# Patient Record
Sex: FEMALE | Race: WHITE | NOT HISPANIC OR LATINO | Employment: FULL TIME | URBAN - METROPOLITAN AREA
[De-identification: names, ages, dates, MRNs, and addresses within clinical notes are randomized per-mention and may not be internally consistent; named-entity substitution may affect disease eponyms.]

---

## 2017-01-03 ENCOUNTER — HOSPITAL ENCOUNTER (OUTPATIENT)
Dept: LAB | Facility: MEDICAL CENTER | Age: 57
End: 2017-01-03
Attending: FAMILY MEDICINE
Payer: COMMERCIAL

## 2017-01-03 ENCOUNTER — HOSPITAL ENCOUNTER (OUTPATIENT)
Facility: MEDICAL CENTER | Age: 57
End: 2017-01-03
Attending: FAMILY MEDICINE
Payer: COMMERCIAL

## 2017-01-03 ENCOUNTER — OFFICE VISIT (OUTPATIENT)
Dept: URGENT CARE | Facility: PHYSICIAN GROUP | Age: 57
End: 2017-01-03
Payer: COMMERCIAL

## 2017-01-03 VITALS
WEIGHT: 132 LBS | HEIGHT: 62 IN | DIASTOLIC BLOOD PRESSURE: 80 MMHG | OXYGEN SATURATION: 100 % | HEART RATE: 87 BPM | BODY MASS INDEX: 24.29 KG/M2 | TEMPERATURE: 98.3 F | SYSTOLIC BLOOD PRESSURE: 118 MMHG | RESPIRATION RATE: 14 BRPM

## 2017-01-03 DIAGNOSIS — N30.01 ACUTE CYSTITIS WITH HEMATURIA: ICD-10-CM

## 2017-01-03 LAB
25(OH)D3 SERPL-MCNC: 15 NG/ML (ref 30–100)
ALBUMIN SERPL BCP-MCNC: 4.3 G/DL (ref 3.2–4.9)
ALBUMIN/GLOB SERPL: 1.7 G/DL
ALP SERPL-CCNC: 60 U/L (ref 30–99)
ALT SERPL-CCNC: 24 U/L (ref 2–50)
ANION GAP SERPL CALC-SCNC: 6 MMOL/L (ref 0–11.9)
APPEARANCE UR: CLEAR
AST SERPL-CCNC: 20 U/L (ref 12–45)
BASOPHILS # BLD AUTO: 0.04 K/UL (ref 0–0.12)
BASOPHILS NFR BLD AUTO: 0.6 % (ref 0–1.8)
BILIRUB SERPL-MCNC: 0.6 MG/DL (ref 0.1–1.5)
BILIRUB UR STRIP-MCNC: NORMAL MG/DL
BUN SERPL-MCNC: 12 MG/DL (ref 8–22)
CALCIUM SERPL-MCNC: 9.5 MG/DL (ref 8.5–10.5)
CHLORIDE SERPL-SCNC: 107 MMOL/L (ref 96–112)
CHOLEST SERPL-MCNC: 166 MG/DL (ref 100–199)
CO2 SERPL-SCNC: 26 MMOL/L (ref 20–33)
COLOR UR AUTO: YELLOW
CREAT SERPL-MCNC: 0.68 MG/DL (ref 0.5–1.4)
EOSINOPHIL # BLD: 0.14 K/UL (ref 0–0.51)
EOSINOPHIL NFR BLD AUTO: 2.1 % (ref 0–6.9)
ERYTHROCYTE [DISTWIDTH] IN BLOOD BY AUTOMATED COUNT: 47.5 FL (ref 35.9–50)
GLOBULIN SER CALC-MCNC: 2.6 G/DL (ref 1.9–3.5)
GLUCOSE SERPL-MCNC: 109 MG/DL (ref 65–99)
GLUCOSE UR STRIP.AUTO-MCNC: NORMAL MG/DL
HCT VFR BLD AUTO: 40 % (ref 37–47)
HDLC SERPL-MCNC: 56 MG/DL
HGB BLD-MCNC: 12.7 G/DL (ref 12–16)
IMM GRANULOCYTES # BLD AUTO: 0.02 K/UL (ref 0–0.11)
IMM GRANULOCYTES NFR BLD AUTO: 0.3 % (ref 0–0.9)
KETONES UR STRIP.AUTO-MCNC: NORMAL MG/DL
LDLC SERPL CALC-MCNC: 96 MG/DL
LEUKOCYTE ESTERASE UR QL STRIP.AUTO: NORMAL
LYMPHOCYTES # BLD: 1.47 K/UL (ref 1–4.8)
LYMPHOCYTES NFR BLD AUTO: 22.5 % (ref 22–41)
MCH RBC QN AUTO: 31.4 PG (ref 27–33)
MCHC RBC AUTO-ENTMCNC: 31.8 G/DL (ref 33.6–35)
MCV RBC AUTO: 99 FL (ref 81.4–97.8)
MONOCYTES # BLD: 0.48 K/UL (ref 0–0.85)
MONOCYTES NFR BLD AUTO: 7.4 % (ref 0–13.4)
NEUTROPHILS # BLD: 4.37 K/UL (ref 2–7.15)
NEUTROPHILS NFR BLD AUTO: 67.1 % (ref 44–72)
NITRITE UR QL STRIP.AUTO: NORMAL
NRBC # BLD AUTO: 0 K/UL
NRBC BLD-RTO: 0 /100 WBC
PH UR STRIP.AUTO: 6 [PH] (ref 5–8)
PLATELET # BLD AUTO: 235 K/UL (ref 164–446)
PMV BLD AUTO: 11.4 FL (ref 9–12.9)
POTASSIUM SERPL-SCNC: 4.3 MMOL/L (ref 3.6–5.5)
PROT SERPL-MCNC: 6.9 G/DL (ref 6–8.2)
PROT UR QL STRIP: NORMAL MG/DL
RBC # BLD AUTO: 4.04 M/UL (ref 4.2–5.4)
RBC UR QL AUTO: NORMAL
SODIUM SERPL-SCNC: 139 MMOL/L (ref 135–145)
SP GR UR STRIP.AUTO: 1.02
TRIGL SERPL-MCNC: 72 MG/DL (ref 0–149)
TSH SERPL DL<=0.005 MIU/L-ACNC: 2.96 UIU/ML (ref 0.3–3.7)
UROBILINOGEN UR STRIP-MCNC: NORMAL MG/DL
WBC # BLD AUTO: 6.5 K/UL (ref 4.8–10.8)

## 2017-01-03 PROCEDURE — 84443 ASSAY THYROID STIM HORMONE: CPT

## 2017-01-03 PROCEDURE — 80061 LIPID PANEL: CPT

## 2017-01-03 PROCEDURE — 81002 URINALYSIS NONAUTO W/O SCOPE: CPT | Performed by: FAMILY MEDICINE

## 2017-01-03 PROCEDURE — 80053 COMPREHEN METABOLIC PANEL: CPT

## 2017-01-03 PROCEDURE — 87086 URINE CULTURE/COLONY COUNT: CPT

## 2017-01-03 PROCEDURE — 85025 COMPLETE CBC W/AUTO DIFF WBC: CPT

## 2017-01-03 PROCEDURE — 99214 OFFICE O/P EST MOD 30 MIN: CPT | Performed by: FAMILY MEDICINE

## 2017-01-03 PROCEDURE — 82306 VITAMIN D 25 HYDROXY: CPT

## 2017-01-03 PROCEDURE — 36415 COLL VENOUS BLD VENIPUNCTURE: CPT

## 2017-01-03 RX ORDER — NITROFURANTOIN 25; 75 MG/1; MG/1
100 CAPSULE ORAL EVERY 12 HOURS
Qty: 10 CAP | Refills: 0 | Status: SHIPPED | OUTPATIENT
Start: 2017-01-03 | End: 2017-01-03 | Stop reason: SDUPTHER

## 2017-01-03 RX ORDER — NITROFURANTOIN 25; 75 MG/1; MG/1
100 CAPSULE ORAL EVERY 12 HOURS
Qty: 10 CAP | Refills: 0 | Status: SHIPPED | OUTPATIENT
Start: 2017-01-03 | End: 2017-01-08

## 2017-01-03 ASSESSMENT — ENCOUNTER SYMPTOMS
VOMITING: 0
NAUSEA: 0
FATIGUE: 0
ARTHRALGIAS: 0
ABDOMINAL PAIN: 1
FEVER: 0
MYALGIAS: 0
CHILLS: 0

## 2017-01-03 NOTE — PROGRESS NOTES
"Subjective:      Clau Lerner is a 56 y.o. female who presents with Urinary Frequency            Urinary Frequency  This is a new problem. The current episode started in the past 7 days. The problem occurs intermittently. The problem has been gradually worsening. Associated symptoms include abdominal pain and urinary symptoms. Pertinent negatives include no arthralgias, chest pain, chills, fatigue, fever, myalgias, nausea, rash or vomiting. Exacerbated by: the urge to go to bathroom. She has tried nothing for the symptoms. The treatment provided no relief.   Prior uti/s \"long time ago\".      Review of Systems   Constitutional: Negative for fever, chills and fatigue.   Cardiovascular: Negative for chest pain.   Gastrointestinal: Positive for abdominal pain. Negative for nausea and vomiting.   Musculoskeletal: Negative for myalgias and arthralgias.   Skin: Negative for rash.     PMH:  has a past medical history of Colon cancer (HCC).  MEDS: No current outpatient prescriptions on file.  ALLERGIES:   Allergies   Allergen Reactions   • Sulfa Drugs Rash     Trunk and chest     SURGHX:   Past Surgical History   Procedure Laterality Date   • Bowel resection       SOCHX:  reports that she quit smoking about 15 years ago. She does not have any smokeless tobacco history on file. She reports that she drinks alcohol. She reports that she does not use illicit drugs.  FH: Family history was reviewed, no pertinent findings to report       Objective:     /80 mmHg  Pulse 87  Temp(Src) 36.8 °C (98.3 °F)  Resp 14  Ht 1.575 m (5' 2\")  Wt 59.875 kg (132 lb)  BMI 24.14 kg/m2  SpO2 100%     Physical Exam   Constitutional: She appears well-developed. No distress.   HENT:   Head: Normocephalic.   Cardiovascular: Normal rate, regular rhythm and normal heart sounds.  Exam reveals no friction rub.    No murmur heard.  Pulmonary/Chest: Effort normal. No respiratory distress. She has no wheezes.   Abdominal: Soft. She exhibits " no distension. There is tenderness. There is no rebound and no guarding.   POSITIVE suprapubic tenderness without any CVAT. No organomegaly   Neurological: She is alert.   Skin: Skin is dry. No rash noted. She is not diaphoretic.   Psychiatric: She has a normal mood and affect. Her behavior is normal.               Assessment/Plan:     1. Acute cystitis with hematuria [N30.01]  POCT Urinalysis    nitrofurantoin monohydr macro (MACROBID) 100 MG Cap    Urine Culture     Supportive care  Push fluids  Monitor temperature  Follow-up if symptoms worsen or fail to improve

## 2017-01-05 LAB
BACTERIA UR CULT: NORMAL
SIGNIFICANT IND 70042: NORMAL
SOURCE SOURCE: NORMAL

## 2017-01-11 ENCOUNTER — HOSPITAL ENCOUNTER (OUTPATIENT)
Dept: RADIOLOGY | Facility: MEDICAL CENTER | Age: 57
End: 2017-01-11
Attending: PHYSICIAN ASSISTANT
Payer: COMMERCIAL

## 2017-01-11 DIAGNOSIS — R19.00 PELVIC MASS: ICD-10-CM

## 2017-01-11 PROCEDURE — 700117 HCHG RX CONTRAST REV CODE 255: Performed by: PHYSICIAN ASSISTANT

## 2017-01-11 PROCEDURE — 74177 CT ABD & PELVIS W/CONTRAST: CPT

## 2017-01-11 RX ADMIN — IOHEXOL 150 ML: 350 INJECTION, SOLUTION INTRAVENOUS at 13:16

## 2017-01-13 ENCOUNTER — HOSPITAL ENCOUNTER (OUTPATIENT)
Dept: LAB | Facility: MEDICAL CENTER | Age: 57
End: 2017-01-13
Attending: INTERNAL MEDICINE
Payer: COMMERCIAL

## 2017-01-13 LAB
ALBUMIN SERPL BCP-MCNC: 4.2 G/DL (ref 3.2–4.9)
ALBUMIN/GLOB SERPL: 1.4 G/DL
ALP SERPL-CCNC: 56 U/L (ref 30–99)
ALT SERPL-CCNC: 21 U/L (ref 2–50)
ANION GAP SERPL CALC-SCNC: 7 MMOL/L (ref 0–11.9)
AST SERPL-CCNC: 22 U/L (ref 12–45)
BASOPHILS # BLD AUTO: 0.02 K/UL (ref 0–0.12)
BASOPHILS NFR BLD AUTO: 0.4 % (ref 0–1.8)
BILIRUB SERPL-MCNC: 0.8 MG/DL (ref 0.1–1.5)
BUN SERPL-MCNC: 11 MG/DL (ref 8–22)
CALCIUM SERPL-MCNC: 9.5 MG/DL (ref 8.5–10.5)
CEA SERPL-MCNC: 25 NG/ML (ref 0–3)
CHLORIDE SERPL-SCNC: 107 MMOL/L (ref 96–112)
CO2 SERPL-SCNC: 24 MMOL/L (ref 20–33)
CREAT SERPL-MCNC: 0.7 MG/DL (ref 0.5–1.4)
EOSINOPHIL # BLD: 0.05 K/UL (ref 0–0.51)
EOSINOPHIL NFR BLD AUTO: 0.9 % (ref 0–6.9)
ERYTHROCYTE [DISTWIDTH] IN BLOOD BY AUTOMATED COUNT: 48.1 FL (ref 35.9–50)
GLOBULIN SER CALC-MCNC: 2.9 G/DL (ref 1.9–3.5)
GLUCOSE SERPL-MCNC: 104 MG/DL (ref 65–99)
HCT VFR BLD AUTO: 39.9 % (ref 37–47)
HGB BLD-MCNC: 13.1 G/DL (ref 12–16)
IMM GRANULOCYTES # BLD AUTO: 0.01 K/UL (ref 0–0.11)
IMM GRANULOCYTES NFR BLD AUTO: 0.2 % (ref 0–0.9)
LYMPHOCYTES # BLD: 1.17 K/UL (ref 1–4.8)
LYMPHOCYTES NFR BLD AUTO: 22 % (ref 22–41)
MCH RBC QN AUTO: 32.3 PG (ref 27–33)
MCHC RBC AUTO-ENTMCNC: 32.8 G/DL (ref 33.6–35)
MCV RBC AUTO: 98.5 FL (ref 81.4–97.8)
MONOCYTES # BLD: 0.52 K/UL (ref 0–0.85)
MONOCYTES NFR BLD AUTO: 9.8 % (ref 0–13.4)
NEUTROPHILS # BLD: 3.56 K/UL (ref 2–7.15)
NEUTROPHILS NFR BLD AUTO: 66.7 % (ref 44–72)
NRBC # BLD AUTO: 0 K/UL
NRBC BLD-RTO: 0 /100 WBC
PLATELET # BLD AUTO: 261 K/UL (ref 164–446)
PMV BLD AUTO: 11.3 FL (ref 9–12.9)
POTASSIUM SERPL-SCNC: 3.9 MMOL/L (ref 3.6–5.5)
PROT SERPL-MCNC: 7.1 G/DL (ref 6–8.2)
RBC # BLD AUTO: 4.05 M/UL (ref 4.2–5.4)
SODIUM SERPL-SCNC: 138 MMOL/L (ref 135–145)
WBC # BLD AUTO: 5.3 K/UL (ref 4.8–10.8)

## 2017-01-13 PROCEDURE — 80053 COMPREHEN METABOLIC PANEL: CPT

## 2017-01-13 PROCEDURE — 36415 COLL VENOUS BLD VENIPUNCTURE: CPT

## 2017-01-13 PROCEDURE — 85025 COMPLETE CBC W/AUTO DIFF WBC: CPT

## 2017-01-13 PROCEDURE — 82378 CARCINOEMBRYONIC ANTIGEN: CPT

## 2017-01-18 ENCOUNTER — HOSPITAL ENCOUNTER (OUTPATIENT)
Dept: RADIOLOGY | Facility: MEDICAL CENTER | Age: 57
End: 2017-01-18
Attending: INTERNAL MEDICINE
Payer: COMMERCIAL

## 2017-01-18 DIAGNOSIS — C18.0 MALIGNANT NEOPLASM OF CECUM (HCC): ICD-10-CM

## 2017-01-18 PROCEDURE — A9552 F18 FDG: HCPCS

## 2017-01-19 ENCOUNTER — APPOINTMENT (OUTPATIENT)
Dept: ADMISSIONS | Facility: MEDICAL CENTER | Age: 57
End: 2017-01-19
Attending: INTERNAL MEDICINE
Payer: COMMERCIAL

## 2017-01-19 RX ORDER — IBUPROFEN 200 MG
200 TABLET ORAL PRN
COMMUNITY
End: 2017-03-09

## 2017-01-20 ENCOUNTER — APPOINTMENT (OUTPATIENT)
Dept: RADIOLOGY | Facility: MEDICAL CENTER | Age: 57
End: 2017-01-20
Attending: INTERNAL MEDICINE
Payer: COMMERCIAL

## 2017-01-20 ENCOUNTER — HOSPITAL ENCOUNTER (OUTPATIENT)
Facility: MEDICAL CENTER | Age: 57
End: 2017-01-20
Attending: INTERNAL MEDICINE | Admitting: INTERNAL MEDICINE
Payer: COMMERCIAL

## 2017-01-20 VITALS
SYSTOLIC BLOOD PRESSURE: 94 MMHG | OXYGEN SATURATION: 95 % | HEART RATE: 86 BPM | HEIGHT: 62 IN | TEMPERATURE: 98 F | DIASTOLIC BLOOD PRESSURE: 58 MMHG | WEIGHT: 127.65 LBS | RESPIRATION RATE: 12 BRPM | BODY MASS INDEX: 23.49 KG/M2

## 2017-01-20 DIAGNOSIS — C18.0 MALIGNANT NEOPLASM OF CECUM (HCC): ICD-10-CM

## 2017-01-20 LAB
INR PPP: 1.1 (ref 0.87–1.13)
PROTHROMBIN TIME: 14.6 SEC (ref 12–14.6)

## 2017-01-20 PROCEDURE — 85610 PROTHROMBIN TIME: CPT

## 2017-01-20 PROCEDURE — 88305 TISSUE EXAM BY PATHOLOGIST: CPT

## 2017-01-20 PROCEDURE — 88342 IMHCHEM/IMCYTCHM 1ST ANTB: CPT

## 2017-01-20 PROCEDURE — 700111 HCHG RX REV CODE 636 W/ 250 OVERRIDE (IP)

## 2017-01-20 PROCEDURE — 88304 TISSUE EXAM BY PATHOLOGIST: CPT

## 2017-01-20 PROCEDURE — 88341 IMHCHEM/IMCYTCHM EA ADD ANTB: CPT | Mod: 91

## 2017-01-20 PROCEDURE — 4410213 CT-NEEDLE BX-ABD-RETROPERITONL

## 2017-01-20 PROCEDURE — 700111 HCHG RX REV CODE 636 W/ 250 OVERRIDE (IP): Performed by: RADIOLOGY

## 2017-01-20 RX ORDER — ONDANSETRON 2 MG/ML
4 INJECTION INTRAMUSCULAR; INTRAVENOUS PRN
Status: DISCONTINUED | OUTPATIENT
Start: 2017-01-20 | End: 2017-01-20 | Stop reason: HOSPADM

## 2017-01-20 RX ORDER — MIDAZOLAM HYDROCHLORIDE 1 MG/ML
INJECTION INTRAMUSCULAR; INTRAVENOUS
Status: COMPLETED
Start: 2017-01-20 | End: 2017-01-20

## 2017-01-20 RX ORDER — SODIUM CHLORIDE 9 MG/ML
500 INJECTION, SOLUTION INTRAVENOUS PRN
Status: DISCONTINUED | OUTPATIENT
Start: 2017-01-20 | End: 2017-01-20 | Stop reason: HOSPADM

## 2017-01-20 RX ORDER — OXYCODONE HYDROCHLORIDE 5 MG/1
10 TABLET ORAL
Status: DISCONTINUED | OUTPATIENT
Start: 2017-01-20 | End: 2017-01-20 | Stop reason: HOSPADM

## 2017-01-20 RX ORDER — SODIUM CHLORIDE 9 MG/ML
INJECTION, SOLUTION INTRAVENOUS
Status: DISCONTINUED | OUTPATIENT
Start: 2017-01-20 | End: 2017-01-20 | Stop reason: HOSPADM

## 2017-01-20 RX ORDER — OXYCODONE HYDROCHLORIDE 5 MG/1
5 TABLET ORAL
Status: DISCONTINUED | OUTPATIENT
Start: 2017-01-20 | End: 2017-01-20 | Stop reason: HOSPADM

## 2017-01-20 RX ORDER — MIDAZOLAM HYDROCHLORIDE 1 MG/ML
.5-2 INJECTION INTRAMUSCULAR; INTRAVENOUS PRN
Status: DISCONTINUED | OUTPATIENT
Start: 2017-01-20 | End: 2017-01-20 | Stop reason: HOSPADM

## 2017-01-20 RX ADMIN — FENTANYL CITRATE 50 MCG: 50 INJECTION, SOLUTION INTRAMUSCULAR; INTRAVENOUS at 08:35

## 2017-01-20 RX ADMIN — MIDAZOLAM HYDROCHLORIDE 2 MG: 1 INJECTION, SOLUTION INTRAMUSCULAR; INTRAVENOUS at 08:40

## 2017-01-20 RX ADMIN — FENTANYL CITRATE 50 MCG: 50 INJECTION, SOLUTION INTRAMUSCULAR; INTRAVENOUS at 08:40

## 2017-01-20 RX ADMIN — SODIUM CHLORIDE: 9 INJECTION, SOLUTION INTRAVENOUS at 06:49

## 2017-01-20 RX ADMIN — MIDAZOLAM 2 MG: 1 INJECTION INTRAMUSCULAR; INTRAVENOUS at 08:35

## 2017-01-20 RX ADMIN — MIDAZOLAM HYDROCHLORIDE 2 MG: 1 INJECTION, SOLUTION INTRAMUSCULAR; INTRAVENOUS at 08:35

## 2017-01-20 ASSESSMENT — PAIN SCALES - GENERAL
PAINLEVEL_OUTOF10: 0

## 2017-01-20 NOTE — IP AVS SNAPSHOT
1/20/2017          Clau Lerner  [address] [address]    Dear Clau:    [facility] wants to ensure your discharge home is safe and you or your loved ones have had all your questions answered regarding your care after you leave the hospital.    You may receive a telephone call within two days of your discharge.  This call is to make certain you understand your discharge instructions as well as ensure we provided you with the best care possible during your stay with us.     The call will only last approximately 3-5 minutes and will be done by a nurse.    Once again, we want to ensure your discharge home is safe and that you have a clear understanding of any next steps in your care.  If you have any questions or concerns, please do not hesitate to contact us, we are here for you.  Thank you for choosing [facility] for your healthcare needs.    Sincerely,    Tino Berrios    [facility]

## 2017-01-20 NOTE — IP AVS SNAPSHOT
" After Visit Summary                                                                                                                Name:Clau Lerner  Medical Record Number:[id number]  CSN: [id number]    YOB: 1960   Age: 56 y.o.  Sex: female  HT:1.575 m (5' 2\") WT: 57.9 kg (127 lb 10.3 oz)          Admit Date: 1/20/2017     Discharge Date:   Today's Date: 1/20/2017  Attending Doctor:  Jamia Mays M.D.                  Allergies:  Sulfa drugs                Discharge Instructions         ACTIVITY: Rest and take it easy for the first 24 hours.  A responsible adult is recommended to remain with you during that time.  It is normal to feel sleepy.  We encourage you to not do anything that requires balance, judgment or coordination.    MILD FLU-LIKE SYMPTOMS ARE NORMAL. YOU MAY EXPERIENCE GENERALIZED MUSCLE ACHES, THROAT IRRITATION, HEADACHE AND/OR SOME NAUSEA.    FOR 24 HOURS DO NOT:  Drive, operate machinery or run household appliances.  Drink beer or alcoholic beverages.   Make important decisions or sign legal documents.      DIET: To avoid nausea, slowly advance diet as tolerated, avoiding spicy or greasy foods for the first day.  Add more substantial food to your diet according to your physician's instructions.  Babies can be fed formula or breast milk as soon as they are hungry.  INCREASE FLUIDS AND FIBER TO AVOID CONSTIPATION.    SURGICAL DRESSING/BATHING:     Keep the dressing clean and dry for 24 hours.  May remove dressing tomorrow at 10 am and shower.  Do not submerge for 1 week.  No heavy lifting.      FOLLOW-UP APPOINTMENT:  A follow-up appointment should be arranged with your doctor ; call to schedule.    You should CALL YOUR PHYSICIAN if you develop:  Fever greater than 101 degrees F.  Pain not relieved by medication, or persistent nausea or vomiting.  Excessive bleeding (blood soaking through dressing) or unexpected drainage from the wound.  Extreme redness or swelling around the " incision site, drainage of pus or foul smelling drainage.  Inability to urinate or empty your bladder within 8 hours.  Problems with breathing or chest pain.    You should call 911 if you develop problems with breathing or chest pain.  If you are unable to contact your doctor or surgical center, you should go to the nearest emergency room or urgent care center.      Physician's telephone #: [phone]    If any questions arise, call your doctor.  If your doctor is not available, please feel free to call the Surgical Center at [phone].  The Center is open Monday through Friday from 7AM to 7PM.  You can also call the HEALTH HOTLINE open 24 hours/day, 7 days/week and speak to a nurse at [phone], or toll free at [phone].    A registered nurse may call you a few days after your surgery to see how you are doing after your procedure.    MEDICATIONS: Resume taking daily medication.  Take prescribed pain medication with food.  If no medication is prescribed, you may take non-aspirin pain medication if needed.  PAIN MEDICATION CAN BE VERY CONSTIPATING.  Take a stool softener or laxative such as senokot, pericolace, or milk of magnesia if needed.    If your physician has prescribed pain medication that includes Acetaminophen (Tylenol), do not take additional Acetaminophen (Tylenol) while taking the prescribed medication.    Depression / Suicide Risk    As you are discharged from this [facility] facility, it is important to learn how to keep safe from harming yourself.    Recognize the warning signs:  · Abrupt changes in personality, positive or negative- including increase in energy   · Giving away possessions  · Change in eating patterns- significant weight changes-  positive or negative  · Change in sleeping patterns- unable to sleep or sleeping all the time   · Unwillingness or inability to communicate  · Depression  · Unusual sadness, discouragement and loneliness  · Talk of wanting to die  · Neglect  of personal appearance   · Rebelliousness- reckless behavior  · Withdrawal from people/activities they love  · Confusion- inability to concentrate     If you or a loved one observes any of these behaviors or has concerns about self-harm, here's what you can do:  · Talk about it- your feelings and reasons for harming yourself  · Remove any means that you might use to hurt yourself (examples: pills, rope, extension cords, firearm)  · Get professional help from the community (Mental Health, Substance Abuse, psychological counseling)  · Do not be alone:Call your Safe Contact- someone whom you trust who will be there for you.  · Call your local CRISIS HOTLINE 706-9107 or 794-247-3376  · Call your local Children's Mobile Crisis Response Team [place] (255) 627-2371 or www.Vascular Closure  · Call the toll free National Suicide Prevention Hotlines   · National Suicide Prevention Lifeline 357-194-KPXY (2793)  · [place] Line Network 800-SUICIDE (528-9211)    Needle Biopsy, Care After    Refer to this sheet in the next few weeks. These instructions provide you with information about caring for yourself after your procedure. Your health care provider may also give you more specific instructions. Your treatment has been planned according to current medical practices, but problems sometimes occur. Call your health care provider if you have any problems or questions after your procedure.  WHAT TO EXPECT AFTER THE PROCEDURE  After your procedure, it is common to have soreness, bruising, or mild pain at the biopsy site. This should go away in a few days.  HOME CARE INSTRUCTIONS  · Rest as directed by your health care provider.  · Take medicines only as directed by your health care provider.  · There are many different ways to close and cover the biopsy site, including stitches (sutures), skin glue, and adhesive strips. Follow your health care provider's instructions about:  ¨ Biopsy site care.  ¨ Bandage (dressing)  changes and removal.  ¨ Biopsy site closure removal.  · Check your biopsy site every day for signs of infection. Watch for:  ¨ Redness, swelling, or pain.  ¨ Fluid, blood, or pus.  SEEK MEDICAL CARE IF:  · You have a fever.  · You have redness, swelling, or pain at the biopsy site that lasts longer than a few days.  · You have fluid, blood, or pus coming from the biopsy site.  · You feel nauseous.  · You vomit.  SEEK IMMEDIATE MEDICAL CARE IF:  · You have shortness of breath.  · You have trouble breathing.  · You have chest pain.    · You feel dizzy or you faint.  · You have bleeding that does not stop with pressure or a bandage.  · You cough up blood.  · You have pain in your abdomen.     This information is not intended to replace advice given to you by your health care provider. Make sure you discuss any questions you have with your health care provider.     Document Released: 05/03/2016 Document Reviewed: 12/14/2015  [employer] Interactive Patient Education ©2016 [employer] Inc.         Medication List      ASK your doctor about these medications        Instructions    ibuprofen 200 MG Tabs   Commonly known as:  MOTRIN    Take 200 mg by mouth as needed.   Dose:  200 mg               Medication Information     Above and/or attached are the medications your physician expects you to take upon discharge. Review all of your home medications and newly ordered medications with your doctor and/or pharmacist. Follow medication instructions as directed by your doctor and/or pharmacist. Please keep your medication list with you and share with your physician. Update the information when medications are discontinued, doses are changed, or new medications (including over-the-counter products) are added; and carry medication information at all times in the event of emergency situations.        Resources     Quit Smoking / Tobacco Use:    I understand the use of any tobacco products increases my chance of suffering from future  heart disease or stroke and could cause other illnesses which may shorten my life. Quitting the use of tobacco products is the single most important thing I can do to improve my health. For further information on smoking / tobacco cessation call a Toll Free Quit Line at 1-236.624.5630 (*[employer]) or 1-665.978.7813 (American Lung Association) or you can access the web based program at www.lungusa.org.    [place] Tobacco Users Help Line:  (416) 903-6800       Toll Free: 1-276.808.9032  Quit Tobacco Program [facility] (455)721-0743    Crisis Hotline:    [place] Crisis Hotline:  7-704-REHVNYW or 1-415.203.1643    [place] Crisis Hotline:    1-150.485.7139 or 855-144-8822    Discharge Survey:   Thank you for choosing [facility]. We hope we did everything we could to make your hospital stay a pleasant one. You may be receiving a survey and we would appreciate your time and participation in answering the questions. Your input is very valuable to us in our efforts to improve our service to our patients and their families.            Signatures     My signature on this form indicates that:    1. I acknowledge receipt and understanding of these Home Care Instruction.  2. My questions regarding this information have been answered to my satisfaction.  3. I have formulated a plan with my discharge nurse to obtain my prescribed medications for home.    __________________________________      __________________________________                   Patient Signature                                 Guardian/Responsible Adult Signature      __________________________________                 __________       ________                       Nurse Signature                                               Date                 Time

## 2017-01-20 NOTE — DISCHARGE INSTRUCTIONS
ACTIVITY: Rest and take it easy for the first 24 hours.  A responsible adult is recommended to remain with you during that time.  It is normal to feel sleepy.  We encourage you to not do anything that requires balance, judgment or coordination.    MILD FLU-LIKE SYMPTOMS ARE NORMAL. YOU MAY EXPERIENCE GENERALIZED MUSCLE ACHES, THROAT IRRITATION, HEADACHE AND/OR SOME NAUSEA.    FOR 24 HOURS DO NOT:  Drive, operate machinery or run household appliances.  Drink beer or alcoholic beverages.   Make important decisions or sign legal documents.      DIET: To avoid nausea, slowly advance diet as tolerated, avoiding spicy or greasy foods for the first day.  Add more substantial food to your diet according to your physician's instructions.  Babies can be fed formula or breast milk as soon as they are hungry.  INCREASE FLUIDS AND FIBER TO AVOID CONSTIPATION.    SURGICAL DRESSING/BATHING:     Keep the dressing clean and dry for 24 hours.  May remove dressing tomorrow at 10 am and shower.  Do not submerge for 1 week.  No heavy lifting.      FOLLOW-UP APPOINTMENT:  A follow-up appointment should be arranged with your doctor ; call to schedule.    You should CALL YOUR PHYSICIAN if you develop:  Fever greater than 101 degrees F.  Pain not relieved by medication, or persistent nausea or vomiting.  Excessive bleeding (blood soaking through dressing) or unexpected drainage from the wound.  Extreme redness or swelling around the incision site, drainage of pus or foul smelling drainage.  Inability to urinate or empty your bladder within 8 hours.  Problems with breathing or chest pain.    You should call 911 if you develop problems with breathing or chest pain.  If you are unable to contact your doctor or surgical center, you should go to the nearest emergency room or urgent care center.      Physician's telephone #: [phone]    If any questions arise, call your doctor.  If your doctor is not available, please feel free to call the  Surgical Center at [phone].  The Center is open Monday through Friday from 7AM to 7PM.  You can also call the HEALTH HOTLINE open 24 hours/day, 7 days/week and speak to a nurse at [phone], or toll free at [phone].    A registered nurse may call you a few days after your surgery to see how you are doing after your procedure.    MEDICATIONS: Resume taking daily medication.  Take prescribed pain medication with food.  If no medication is prescribed, you may take non-aspirin pain medication if needed.  PAIN MEDICATION CAN BE VERY CONSTIPATING.  Take a stool softener or laxative such as senokot, pericolace, or milk of magnesia if needed.    If your physician has prescribed pain medication that includes Acetaminophen (Tylenol), do not take additional Acetaminophen (Tylenol) while taking the prescribed medication.    Depression / Suicide Risk    As you are discharged from this [facility] facility, it is important to learn how to keep safe from harming yourself.    Recognize the warning signs:  · Abrupt changes in personality, positive or negative- including increase in energy   · Giving away possessions  · Change in eating patterns- significant weight changes-  positive or negative  · Change in sleeping patterns- unable to sleep or sleeping all the time   · Unwillingness or inability to communicate  · Depression  · Unusual sadness, discouragement and loneliness  · Talk of wanting to die  · Neglect of personal appearance   · Rebelliousness- reckless behavior  · Withdrawal from people/activities they love  · Confusion- inability to concentrate     If you or a loved one observes any of these behaviors or has concerns about self-harm, here's what you can do:  · Talk about it- your feelings and reasons for harming yourself  · Remove any means that you might use to hurt yourself (examples: pills, rope, extension cords, firearm)  · Get professional help from the community (Mental Health, Substance Abuse,  psychological counseling)  · Do not be alone:Call your Safe Contact- someone whom you trust who will be there for you.  · Call your local CRISIS HOTLINE 707-2458 or 864-929-2403  · Call your local Children's Mobile Crisis Response Team [place] (772) 646-4159 or www.qcue  · Call the toll free National Suicide Prevention Hotlines   · National Suicide Prevention Lifeline 196-995-JHAE (9062)  · National [employer] Line Network 800-SUICIDE (598-3874)    Needle Biopsy, Care After    Refer to this sheet in the next few weeks. These instructions provide you with information about caring for yourself after your procedure. Your health care provider may also give you more specific instructions. Your treatment has been planned according to current medical practices, but problems sometimes occur. Call your health care provider if you have any problems or questions after your procedure.  WHAT TO EXPECT AFTER THE PROCEDURE  After your procedure, it is common to have soreness, bruising, or mild pain at the biopsy site. This should go away in a few days.  HOME CARE INSTRUCTIONS  · Rest as directed by your health care provider.  · Take medicines only as directed by your health care provider.  · There are many different ways to close and cover the biopsy site, including stitches (sutures), skin glue, and adhesive strips. Follow your health care provider's instructions about:  ¨ Biopsy site care.  ¨ Bandage (dressing) changes and removal.  ¨ Biopsy site closure removal.  · Check your biopsy site every day for signs of infection. Watch for:  ¨ Redness, swelling, or pain.  ¨ Fluid, blood, or pus.  SEEK MEDICAL CARE IF:  · You have a fever.  · You have redness, swelling, or pain at the biopsy site that lasts longer than a few days.  · You have fluid, blood, or pus coming from the biopsy site.  · You feel nauseous.  · You vomit.  SEEK IMMEDIATE MEDICAL CARE IF:  · You have shortness of breath.  · You have trouble  breathing.  · You have chest pain.    · You feel dizzy or you faint.  · You have bleeding that does not stop with pressure or a bandage.  · You cough up blood.  · You have pain in your abdomen.     This information is not intended to replace advice given to you by your health care provider. Make sure you discuss any questions you have with your health care provider.     Document Released: 05/03/2016 Document Reviewed: 12/14/2015  [email or web address] Interactive Patient Education ©2016 [employer]vier Inc.

## 2017-01-20 NOTE — OR NURSING
0900: Patient from radio;logy to PPU via RECESS.. Patient is awake and on bedrest. VSS.  Lower abdominal site soft and dressing clean, dry and intact. Vascular access care management per MD order (see assessment). No c/o pain or nausea at this time.   0920: VSS. Patient tolerating PO well.   1000: VSS. Lower abdomen dressing CDI. No c/o pain or nausea at this time.  1030: DC instructions given. Questions answered. Family at bedside. Lower abdomen site soft,CDI. No c/o pain or nausea. Patient wide awake. VSS. Patient met criteria for discharge.

## 2017-01-20 NOTE — PROGRESS NOTES
CT Procedure RN's Note:    CT guided pelvic mass biopsy done by Dr. Naylor; anterior lower abdomen access site; x3 cores obtained and sent to pathology; pt tolerated the procedure well; pt currently hemodynamically stable; report given to Pierre; pt transported back to PPU for recovery; pt's family taken back to PPU with patient.

## 2017-01-20 NOTE — OR SURGEON
Immediate Post- Operative Note        PostOp Diagnosis: L4 Compression FX      Procedure(s): L4 kyphoplasty      Estimated Blood Loss: Less than 5 ml        Complications: None            1/20/2017     9:12 AM     Liborio Naylor

## 2017-02-22 ENCOUNTER — HOSPITAL ENCOUNTER (OUTPATIENT)
Dept: LAB | Facility: MEDICAL CENTER | Age: 57
End: 2017-02-22
Payer: COMMERCIAL

## 2017-02-22 LAB
ALBUMIN SERPL BCP-MCNC: 4.4 G/DL (ref 3.2–4.9)
ALP SERPL-CCNC: 66 U/L (ref 30–99)
AST SERPL-CCNC: 24 U/L (ref 12–45)
BASOPHILS # BLD AUTO: 0.02 K/UL (ref 0–0.12)
BASOPHILS NFR BLD AUTO: 0.3 % (ref 0–1.8)
BILIRUB SERPL-MCNC: 0.5 MG/DL (ref 0.1–1.5)
CREAT SERPL-MCNC: 0.86 MG/DL (ref 0.5–1.4)
EOSINOPHIL # BLD: 0.04 K/UL (ref 0–0.51)
EOSINOPHIL NFR BLD AUTO: 0.6 % (ref 0–6.9)
ERYTHROCYTE [DISTWIDTH] IN BLOOD BY AUTOMATED COUNT: 45.5 FL (ref 35.9–50)
HCT VFR BLD AUTO: 38.9 % (ref 37–47)
HGB BLD-MCNC: 12.1 G/DL (ref 12–16)
IMM GRANULOCYTES # BLD AUTO: 0.02 K/UL (ref 0–0.11)
IMM GRANULOCYTES NFR BLD AUTO: 0.3 % (ref 0–0.9)
LDH SERPL L TO P-CCNC: 266 U/L (ref 107–266)
LYMPHOCYTES # BLD: 0.96 K/UL (ref 1–4.8)
LYMPHOCYTES NFR BLD AUTO: 14.3 % (ref 22–41)
MCH RBC QN AUTO: 30.7 PG (ref 27–33)
MCHC RBC AUTO-ENTMCNC: 31.1 G/DL (ref 33.6–35)
MCV RBC AUTO: 98.7 FL (ref 81.4–97.8)
MONOCYTES # BLD: 0.5 K/UL (ref 0–0.85)
MONOCYTES NFR BLD AUTO: 7.4 % (ref 0–13.4)
NEUTROPHILS # BLD: 5.18 K/UL (ref 2–7.15)
NEUTROPHILS NFR BLD AUTO: 77.1 % (ref 44–72)
NRBC # BLD AUTO: 0 K/UL
NRBC BLD-RTO: 0 /100 WBC
PLATELET # BLD AUTO: 268 K/UL (ref 164–446)
PMV BLD AUTO: 11.4 FL (ref 9–12.9)
RBC # BLD AUTO: 3.94 M/UL (ref 4.2–5.4)
WBC # BLD AUTO: 6.7 K/UL (ref 4.8–10.8)

## 2017-02-22 PROCEDURE — 82040 ASSAY OF SERUM ALBUMIN: CPT

## 2017-02-22 PROCEDURE — 82565 ASSAY OF CREATININE: CPT

## 2017-02-22 PROCEDURE — 82378 CARCINOEMBRYONIC ANTIGEN: CPT

## 2017-02-22 PROCEDURE — 86316 IMMUNOASSAY TUMOR OTHER: CPT

## 2017-02-22 PROCEDURE — 82247 BILIRUBIN TOTAL: CPT

## 2017-02-22 PROCEDURE — 83615 LACTATE (LD) (LDH) ENZYME: CPT

## 2017-02-22 PROCEDURE — 36415 COLL VENOUS BLD VENIPUNCTURE: CPT

## 2017-02-22 PROCEDURE — 85025 COMPLETE CBC W/AUTO DIFF WBC: CPT

## 2017-02-22 PROCEDURE — 84450 TRANSFERASE (AST) (SGOT): CPT

## 2017-02-22 PROCEDURE — 84075 ASSAY ALKALINE PHOSPHATASE: CPT

## 2017-02-23 LAB — CEA SERPL-MCNC: 30.3 NG/ML (ref 0–3)

## 2017-02-24 ENCOUNTER — APPOINTMENT (OUTPATIENT)
Dept: RADIOLOGY | Facility: MEDICAL CENTER | Age: 57
End: 2017-02-24
Attending: INTERNAL MEDICINE
Payer: COMMERCIAL

## 2017-02-24 ENCOUNTER — HOSPITAL ENCOUNTER (OUTPATIENT)
Facility: MEDICAL CENTER | Age: 57
End: 2017-02-24
Attending: INTERNAL MEDICINE | Admitting: INTERNAL MEDICINE
Payer: COMMERCIAL

## 2017-02-24 VITALS
WEIGHT: 139.33 LBS | HEIGHT: 62 IN | HEART RATE: 89 BPM | TEMPERATURE: 97.5 F | OXYGEN SATURATION: 96 % | SYSTOLIC BLOOD PRESSURE: 106 MMHG | BODY MASS INDEX: 25.64 KG/M2 | RESPIRATION RATE: 16 BRPM | DIASTOLIC BLOOD PRESSURE: 57 MMHG

## 2017-02-24 DIAGNOSIS — C18.0 MALIGNANT NEOPLASM OF CECUM (HCC): ICD-10-CM

## 2017-02-24 LAB — NSE SERPL-MCNC: 3.4 UG/L (ref 3.7–8.9)

## 2017-02-24 PROCEDURE — 700111 HCHG RX REV CODE 636 W/ 250 OVERRIDE (IP)

## 2017-02-24 PROCEDURE — 99153 MOD SED SAME PHYS/QHP EA: CPT

## 2017-02-24 PROCEDURE — 160002 HCHG RECOVERY MINUTES (STAT)

## 2017-02-24 PROCEDURE — 77001 FLUOROGUIDE FOR VEIN DEVICE: CPT

## 2017-02-24 PROCEDURE — 36561 INSERT TUNNELED CV CATH: CPT

## 2017-02-24 PROCEDURE — 700111 HCHG RX REV CODE 636 W/ 250 OVERRIDE (IP): Mod: JW | Performed by: RADIOLOGY

## 2017-02-24 PROCEDURE — 700101 HCHG RX REV CODE 250

## 2017-02-24 PROCEDURE — 76937 US GUIDE VASCULAR ACCESS: CPT

## 2017-02-24 PROCEDURE — C1894 INTRO/SHEATH, NON-LASER: HCPCS

## 2017-02-24 RX ORDER — LIDOCAINE HYDROCHLORIDE AND EPINEPHRINE BITARTRATE 20; .01 MG/ML; MG/ML
INJECTION, SOLUTION SUBCUTANEOUS
Status: COMPLETED
Start: 2017-02-24 | End: 2017-02-24

## 2017-02-24 RX ORDER — MIDAZOLAM HYDROCHLORIDE 1 MG/ML
INJECTION INTRAMUSCULAR; INTRAVENOUS
Status: COMPLETED
Start: 2017-02-24 | End: 2017-02-24

## 2017-02-24 RX ORDER — SODIUM CHLORIDE 9 MG/ML
INJECTION, SOLUTION INTRAVENOUS
Status: DISCONTINUED | OUTPATIENT
Start: 2017-02-24 | End: 2017-02-24 | Stop reason: HOSPADM

## 2017-02-24 RX ORDER — OXYCODONE HYDROCHLORIDE 5 MG/1
5 TABLET ORAL
Status: DISCONTINUED | OUTPATIENT
Start: 2017-02-24 | End: 2017-02-24 | Stop reason: HOSPADM

## 2017-02-24 RX ORDER — ONDANSETRON 2 MG/ML
4 INJECTION INTRAMUSCULAR; INTRAVENOUS EVERY 8 HOURS PRN
Status: DISCONTINUED | OUTPATIENT
Start: 2017-02-24 | End: 2017-02-24 | Stop reason: HOSPADM

## 2017-02-24 RX ORDER — OXYCODONE HYDROCHLORIDE 5 MG/1
10 TABLET ORAL
Status: DISCONTINUED | OUTPATIENT
Start: 2017-02-24 | End: 2017-02-24 | Stop reason: HOSPADM

## 2017-02-24 RX ORDER — MIDAZOLAM HYDROCHLORIDE 1 MG/ML
.5-2 INJECTION INTRAMUSCULAR; INTRAVENOUS PRN
Status: DISCONTINUED | OUTPATIENT
Start: 2017-02-24 | End: 2017-02-24 | Stop reason: HOSPADM

## 2017-02-24 RX ORDER — SODIUM CHLORIDE 9 MG/ML
500 INJECTION, SOLUTION INTRAVENOUS PRN
Status: DISCONTINUED | OUTPATIENT
Start: 2017-02-24 | End: 2017-02-24 | Stop reason: HOSPADM

## 2017-02-24 RX ORDER — MORPHINE SULFATE 4 MG/ML
4 INJECTION, SOLUTION INTRAMUSCULAR; INTRAVENOUS
Status: DISCONTINUED | OUTPATIENT
Start: 2017-02-24 | End: 2017-02-24 | Stop reason: HOSPADM

## 2017-02-24 RX ORDER — ONDANSETRON 2 MG/ML
4 INJECTION INTRAMUSCULAR; INTRAVENOUS PRN
Status: DISCONTINUED | OUTPATIENT
Start: 2017-02-24 | End: 2017-02-24 | Stop reason: HOSPADM

## 2017-02-24 RX ADMIN — HEPARIN: 100 SYRINGE at 10:16

## 2017-02-24 RX ADMIN — MIDAZOLAM HYDROCHLORIDE 2 MG: 1 INJECTION, SOLUTION INTRAMUSCULAR; INTRAVENOUS at 10:12

## 2017-02-24 RX ADMIN — MIDAZOLAM HYDROCHLORIDE 1 MG: 1 INJECTION, SOLUTION INTRAMUSCULAR; INTRAVENOUS at 10:03

## 2017-02-24 RX ADMIN — FENTANYL CITRATE 25 MCG: 50 INJECTION, SOLUTION INTRAMUSCULAR; INTRAVENOUS at 10:20

## 2017-02-24 RX ADMIN — MIDAZOLAM HYDROCHLORIDE 2 MG: 1 INJECTION, SOLUTION INTRAMUSCULAR; INTRAVENOUS at 10:18

## 2017-02-24 RX ADMIN — MIDAZOLAM 1 MG: 1 INJECTION INTRAMUSCULAR; INTRAVENOUS at 10:03

## 2017-02-24 RX ADMIN — MIDAZOLAM HYDROCHLORIDE 1 MG: 1 INJECTION, SOLUTION INTRAMUSCULAR; INTRAVENOUS at 10:15

## 2017-02-24 RX ADMIN — FENTANYL CITRATE 25 MCG: 50 INJECTION, SOLUTION INTRAMUSCULAR; INTRAVENOUS at 10:03

## 2017-02-24 RX ADMIN — FENTANYL CITRATE 25 MCG: 50 INJECTION, SOLUTION INTRAMUSCULAR; INTRAVENOUS at 10:15

## 2017-02-24 RX ADMIN — SODIUM CHLORIDE: 9 INJECTION, SOLUTION INTRAVENOUS at 10:00

## 2017-02-24 RX ADMIN — LIDOCAINE HYDROCHLORIDE AND EPINEPHRINE: 20; 10 INJECTION, SOLUTION INFILTRATION; PERINEURAL at 10:15

## 2017-02-24 ASSESSMENT — PAIN SCALES - GENERAL
PAINLEVEL_OUTOF10: 0

## 2017-02-24 NOTE — PROGRESS NOTES
IR RN note:    Tunneled Port Placement by MD Danielle assisted by RT Oates,Right upper chest and IJ access site;  Port Placed 24 cm    BARD PowerPort ClearVUE Slim implantable port 8 Fr open-ended single lumen Venous  REF# [id number]   LOT# [id number]    Patient tolerated procedure, hemodynamically stable; report given to PPU ALICE Lopez;   patient transported back to PPU via RN

## 2017-02-24 NOTE — IP AVS SNAPSHOT
" Home Care Instructions                                                                                                                Name:Clau Lerner  Medical Record Number:[id number]  CSN: [id number]    YOB: 1960   Age: 56 y.o.  Sex: female  HT:1.575 m (5' 2\") WT: 63.2 kg (139 lb 5.3 oz)          Admit Date: 2/24/2017     Discharge Date:   Today's Date: 2/24/2017  Attending Doctor:  Jamia Mays M.D.                  Allergies:  Sulfa drugs                Discharge Instructions         ACTIVITY: Rest and take it easy for the first 24 hours.  A responsible adult is recommended to remain with you during that time.  It is normal to feel sleepy.  We encourage you to not do anything that requires balance, judgment or coordination.    MILD FLU-LIKE SYMPTOMS ARE NORMAL. YOU MAY EXPERIENCE GENERALIZED MUSCLE ACHES, THROAT IRRITATION, HEADACHE AND/OR SOME NAUSEA.    FOR 24 HOURS DO NOT:  Drive, operate machinery or run household appliances.  Drink beer or alcoholic beverages.   Make important decisions or sign legal documents.    SPECIAL INSTRUCTIONS:   Implanted Port Insertion  An implanted port is a central line that has a round shape and is placed under the skin. It is used as a long-term IV access for:   · Medicines, such as chemotherapy.    · Fluids.    · Liquid nutrition, such as total parenteral nutrition (TPN).    · Blood samples.    LET YOUR HEALTH CARE PROVIDER KNOW ABOUT:  · Allergies to food or medicine.    · Medicines taken, including vitamins, herbs, eye drops, creams, and over-the-counter medicines.    · Any allergies to heparin.  · Use of steroids (by mouth or creams).    · Previous problems with anesthetics or numbing medicines.    · History of bleeding problems or blood clots.    · Previous surgery.    · Other health problems, including diabetes and kidney problems.    · Possibility of pregnancy, if this applies.  RISKS AND COMPLICATIONS  Generally, this is a safe procedure. However, " as with any procedure, problems can occur. Possible problems include:  · Damage to the blood vessel, bruising, or bleeding at the puncture site.    · Infection.  · Blood clot in the vessel that the port is in.  · Breakdown of the skin over your port.  · Very rarely a person may develop a condition called a pneumothorax, a collection of air in the chest that may cause one of the lungs to collapse. The placement of these catheters with the appropriate imaging guidance significantly decreases the risk of a pneumothorax.    BEFORE THE PROCEDURE   · Your health care provider may want you to have blood tests. These tests can help tell how well your kidneys and liver are working. They can also show how well your blood clots.    · If you take blood thinners (anticoagulant medicines), ask your health care provider when you should stop taking them.    · Make arrangements for someone to drive you home. This is necessary if you have been sedated for your procedure.    PROCEDURE   Port insertion usually takes about 30-45 minutes.   · An IV needle will be inserted in your arm. Medicine for pain and medicine to help relax you (sedative) will flow directly into your body through this needle.    · You will lie on an exam table, and you will be connected to monitors to keep track of your heart rate, blood pressure, and breathing throughout the procedure.  · An oxygen monitoring device may be attached to your finger. Oxygen will be given.    · Everything will be kept as germ free (sterile) as possible during the procedure. The skin near the point of the incision will be cleansed with antiseptic, and the area will be draped with sterile towels. The skin and deeper tissues over the port area will be made numb with a local anesthetic.  · Two small cuts (incisions) will be made in the skin to insert the port. One will be made in the neck to obtain access to the vein where the catheter will lie.    · Because the port reservoir will be  placed under the skin, a small skin incision will be made in the upper chest, and a small pocket for the port will be made under the skin. The catheter that will be connected to the port tunnels to a large central vein in the chest. A small, raised area will remain on your body at the site of the reservoir when the procedure is complete.  · The port placement will be done under imaging guidance to ensure the proper placement.    · The reservoir has a silicone covering that can be punctured with a special needle.    · The port will be flushed with normal saline, and blood will be drawn to make sure it is working properly.  · There will be nothing remaining outside the skin when the procedure is finished.    · Incisions will be held together by stitches, surgical glue, or a special tape.  AFTER THE PROCEDURE  · You will stay in a recovery area until the anesthesia has worn off. Your blood pressure and pulse will be checked.  · A final chest X-ray will be taken to check the placement of the port and to ensure that there is no injury to your lung.     This information is not intended to replace advice given to you by your health care provider. Make sure you discuss any questions you have with your health care provider.     Document Released: 10/08/2014 Document Revised: 01/08/2016 Document Reviewed: 10/08/2014  [employer] Interactive Patient Education ©2016 [employer]      DIET: To avoid nausea, slowly advance diet as tolerated, avoiding spicy or greasy foods for the first day.  Add more substantial food to your diet according to your physician's instructions.  Babies can be fed formula or breast milk as soon as they are hungry.  INCREASE FLUIDS AND FIBER TO AVOID CONSTIPATION.    SURGICAL DRESSING/BATHING: KEEP DRESSING ON AND CLEAN AND DRY FOR 7 DAYS. NO SHOWER, SPONGE BATH ONLY.    FOLLOW-UP APPOINTMENT:  A follow-up appointment should be arranged with your doctor in 1 WEEK3; call to schedule. DR GUEVARA-  [phone]    You should CALL YOUR PHYSICIAN if you develop:  Fever greater than 101 degrees F.  Pain not relieved by medication, or persistent nausea or vomiting.  Excessive bleeding (blood soaking through dressing) or unexpected drainage from the wound.  Extreme redness or swelling around the incision site, drainage of pus or foul smelling drainage.  Inability to urinate or empty your bladder within 8 hours.  Problems with breathing or chest pain.    You should call 911 if you develop problems with breathing or chest pain.  If you are unable to contact your doctor or surgical center, you should go to the nearest emergency room or urgent care center.  Physician's telephone #[phone] -RADIOLOGY    If any questions arise, call your doctor.  If your doctor is not available, please feel free to call the Surgical Center at [phone]  The Center is open Monday through Friday from 7AM to 7PM.  You can also call the HEALTH HOTLINE open 24 hours/day, 7 days/week and speak to a nurse at [phone], or toll free at [phone].    A registered nurse may call you a few days after your surgery to see how you are doing after your procedure.    MEDICATIONS: Resume taking daily medication.  Take prescribed pain medication with food.  If no medication is prescribed, you may take non-aspirin pain medication if needed.  PAIN MEDICATION CAN BE VERY CONSTIPATING.  Take a stool softener or laxative such as senokot, pericolace, or milk of magnesia if needed.        Depression / Suicide Risk    As you are discharged from this [facility] facility, it is important to learn how to keep safe from harming yourself.    Recognize the warning signs:  · Abrupt changes in personality, positive or negative- including increase in energy   · Giving away possessions  · Change in eating patterns- significant weight changes-  positive or negative  · Change in sleeping patterns- unable to sleep or sleeping all the time   · Unwillingness or  inability to communicate  · Depression  · Unusual sadness, discouragement and loneliness  · Talk of wanting to die  · Neglect of personal appearance   · Rebelliousness- reckless behavior  · Withdrawal from people/activities they love  · Confusion- inability to concentrate     If you or a loved one observes any of these behaviors or has concerns about self-harm, here's what you can do:  · Talk about it- your feelings and reasons for harming yourself  · Remove any means that you might use to hurt yourself (examples: pills, rope, extension cords, firearm)  · Get professional help from the community (Mental Health, Substance Abuse, psychological counseling)  · Do not be alone:Call your Safe Contact- someone whom you trust who will be there for you.  · Call your local CRISIS HOTLINE 564-9401 or 984-873-1941  · Call your local Children's Mobile Crisis Response Team [place] (104) 709-2589 or www.Qazzow  · Call the toll free National Suicide Prevention Hotlines   · National Suicide Prevention Lifeline 496-692-NMUX (9572)  · [place] Line Network 800-SUICIDE (460-0047)       Medication List      CONTINUE taking these medications        Instructions    ibuprofen 200 MG Tabs   Commonly known as:  MOTRIN    Take 200 mg by mouth as needed.   Dose:  200 mg               Medication Information     Above and/or attached are the medications your physician expects you to take upon discharge. Review all of your home medications and newly ordered medications with your doctor and/or pharmacist. Follow medication instructions as directed by your doctor and/or pharmacist. Please keep your medication list with you and share with your physician. Update the information when medications are discontinued, doses are changed, or new medications (including over-the-counter products) are added; and carry medication information at all times in the event of emergency situations.        Resources     Quit Smoking / Tobacco  Use:    I understand the use of any tobacco products increases my chance of suffering from future heart disease or stroke and could cause other illnesses which may shorten my life. Quitting the use of tobacco products is the single most important thing I can do to improve my health. For further information on smoking / tobacco cessation call a Toll Free Quit Line at 1-581.476.3043 (*National Cancer [place]) or 1-420.797.4419 (American Lung Association) or you can access the web based program at www.lungusa.org.    [place] Tobacco Users Help Line:  (704) 717-9502       Toll Free: 1-308.254.6418  Quit Tobacco Program [facility] (286)225-0248    Crisis Hotline:    [place] Crisis Hotline:  9-928-NUUDKBF or 1-344.675.5383    [place] Crisis Hotline:    1-991.975.8954 or 947-531-1995    Discharge Survey:   Thank you for choosing [facility]. We hope we did everything we could to make your hospital stay a pleasant one. You may be receiving a survey and we would appreciate your time and participation in answering the questions. Your input is very valuable to us in our efforts to improve our service to our patients and their families.            Signatures     My signature on this form indicates that:    1. I acknowledge receipt and understanding of these Home Care Instruction.  2. My questions regarding this information have been answered to my satisfaction.  3. I have formulated a plan with my discharge nurse to obtain my prescribed medications for home.    __________________________________      __________________________________                   Patient Signature                                 Guardian/Responsible Adult Signature      __________________________________                 __________       ________                       Nurse Signature                                               Date                 Time

## 2017-02-24 NOTE — OR NURSING
1044 Received to PPU #4, awake and responsive but a little drowsy . With dressing to Right chest- CDI. No c/o pain or other discomfort.  at bedside.   1055 Given water to drink. No changes.  1100 No changes, no c/o discomfort.  1130 Prepared for discharge.

## 2017-02-24 NOTE — OR SURGEON
Immediate Post- Operative Note        PostOp Diagnosis: Colon cancer requiring long term chemotherapy.       Procedure(s): Port placement.       Estimated Blood Loss: Less than 5 ml        Complications: None            2/24/2017    1028 AM     Isaac Danielle

## 2017-02-24 NOTE — DISCHARGE INSTRUCTIONS
ACTIVITY: Rest and take it easy for the first 24 hours.  A responsible adult is recommended to remain with you during that time.  It is normal to feel sleepy.  We encourage you to not do anything that requires balance, judgment or coordination.    MILD FLU-LIKE SYMPTOMS ARE NORMAL. YOU MAY EXPERIENCE GENERALIZED MUSCLE ACHES, THROAT IRRITATION, HEADACHE AND/OR SOME NAUSEA.    FOR 24 HOURS DO NOT:  Drive, operate machinery or run household appliances.  Drink beer or alcoholic beverages.   Make important decisions or sign legal documents.    SPECIAL INSTRUCTIONS:   Implanted Port Insertion  An implanted port is a central line that has a round shape and is placed under the skin. It is used as a long-term IV access for:   · Medicines, such as chemotherapy.    · Fluids.    · Liquid nutrition, such as total parenteral nutrition (TPN).    · Blood samples.    LET YOUR HEALTH CARE PROVIDER KNOW ABOUT:  · Allergies to food or medicine.    · Medicines taken, including vitamins, herbs, eye drops, creams, and over-the-counter medicines.    · Any allergies to heparin.  · Use of steroids (by mouth or creams).    · Previous problems with anesthetics or numbing medicines.    · History of bleeding problems or blood clots.    · Previous surgery.    · Other health problems, including diabetes and kidney problems.    · Possibility of pregnancy, if this applies.  RISKS AND COMPLICATIONS  Generally, this is a safe procedure. However, as with any procedure, problems can occur. Possible problems include:  · Damage to the blood vessel, bruising, or bleeding at the puncture site.    · Infection.  · Blood clot in the vessel that the port is in.  · Breakdown of the skin over your port.  · Very rarely a person may develop a condition called a pneumothorax, a collection of air in the chest that may cause one of the lungs to collapse. The placement of these catheters with the appropriate imaging guidance significantly decreases the risk of a  pneumothorax.    BEFORE THE PROCEDURE   · Your health care provider may want you to have blood tests. These tests can help tell how well your kidneys and liver are working. They can also show how well your blood clots.    · If you take blood thinners (anticoagulant medicines), ask your health care provider when you should stop taking them.    · Make arrangements for someone to drive you home. This is necessary if you have been sedated for your procedure.    PROCEDURE   Port insertion usually takes about 30-45 minutes.   · An IV needle will be inserted in your arm. Medicine for pain and medicine to help relax you (sedative) will flow directly into your body through this needle.    · You will lie on an exam table, and you will be connected to monitors to keep track of your heart rate, blood pressure, and breathing throughout the procedure.  · An oxygen monitoring device may be attached to your finger. Oxygen will be given.    · Everything will be kept as germ free (sterile) as possible during the procedure. The skin near the point of the incision will be cleansed with antiseptic, and the area will be draped with sterile towels. The skin and deeper tissues over the port area will be made numb with a local anesthetic.  · Two small cuts (incisions) will be made in the skin to insert the port. One will be made in the neck to obtain access to the vein where the catheter will lie.    · Because the port reservoir will be placed under the skin, a small skin incision will be made in the upper chest, and a small pocket for the port will be made under the skin. The catheter that will be connected to the port tunnels to a large central vein in the chest. A small, raised area will remain on your body at the site of the reservoir when the procedure is complete.  · The port placement will be done under imaging guidance to ensure the proper placement.    · The reservoir has a silicone covering that can be punctured with a special  needle.    · The port will be flushed with normal saline, and blood will be drawn to make sure it is working properly.  · There will be nothing remaining outside the skin when the procedure is finished.    · Incisions will be held together by stitches, surgical glue, or a special tape.  AFTER THE PROCEDURE  · You will stay in a recovery area until the anesthesia has worn off. Your blood pressure and pulse will be checked.  · A final chest X-ray will be taken to check the placement of the port and to ensure that there is no injury to your lung.     This information is not intended to replace advice given to you by your health care provider. Make sure you discuss any questions you have with your health care provider.     Document Released: 10/08/2014 Document Revised: 01/08/2016 Document Reviewed: 10/08/2014  [employer] Interactive Patient Education ©2016 [employer]      DIET: To avoid nausea, slowly advance diet as tolerated, avoiding spicy or greasy foods for the first day.  Add more substantial food to your diet according to your physician's instructions.  Babies can be fed formula or breast milk as soon as they are hungry.  INCREASE FLUIDS AND FIBER TO AVOID CONSTIPATION.    SURGICAL DRESSING/BATHING: KEEP DRESSING ON AND CLEAN AND DRY FOR 7 DAYS. NO SHOWER, SPONGE BATH ONLY.    FOLLOW-UP APPOINTMENT:  A follow-up appointment should be arranged with your doctor in 1 WEEK3; call to schedule. DR GUEVARA- [phone]    You should CALL YOUR PHYSICIAN if you develop:  Fever greater than 101 degrees F.  Pain not relieved by medication, or persistent nausea or vomiting.  Excessive bleeding (blood soaking through dressing) or unexpected drainage from the wound.  Extreme redness or swelling around the incision site, drainage of pus or foul smelling drainage.  Inability to urinate or empty your bladder within 8 hours.  Problems with breathing or chest pain.    You should call 911 if you develop problems with breathing or  chest pain.  If you are unable to contact your doctor or surgical center, you should go to the nearest emergency room or urgent care center.  Physician's telephone #[phone] -RADIOLOGY    If any questions arise, call your doctor.  If your doctor is not available, please feel free to call the Surgical Center at [phone]  The Center is open Monday through Friday from 7AM to 7PM.  You can also call the HEALTH HOTLINE open 24 hours/day, 7 days/week and speak to a nurse at [phone], or toll free at [phone].    A registered nurse may call you a few days after your surgery to see how you are doing after your procedure.    MEDICATIONS: Resume taking daily medication.  Take prescribed pain medication with food.  If no medication is prescribed, you may take non-aspirin pain medication if needed.  PAIN MEDICATION CAN BE VERY CONSTIPATING.  Take a stool softener or laxative such as senokot, pericolace, or milk of magnesia if needed.        Depression / Suicide Risk    As you are discharged from this [facility] facility, it is important to learn how to keep safe from harming yourself.    Recognize the warning signs:  · Abrupt changes in personality, positive or negative- including increase in energy   · Giving away possessions  · Change in eating patterns- significant weight changes-  positive or negative  · Change in sleeping patterns- unable to sleep or sleeping all the time   · Unwillingness or inability to communicate  · Depression  · Unusual sadness, discouragement and loneliness  · Talk of wanting to die  · Neglect of personal appearance   · Rebelliousness- reckless behavior  · Withdrawal from people/activities they love  · Confusion- inability to concentrate     If you or a loved one observes any of these behaviors or has concerns about self-harm, here's what you can do:  · Talk about it- your feelings and reasons for harming yourself  · Remove any means that you might use to hurt yourself (examples:  pills, rope, extension cords, firearm)  · Get professional help from the community (Mental Health, Substance Abuse, psychological counseling)  · Do not be alone:Call your Safe Contact- someone whom you trust who will be there for you.  · Call your local CRISIS HOTLINE 678-0838 or 116-589-4304  · Call your local Children's Mobile Crisis Response Team [place] (745) 226-3450 or www.[employer]  · Call the toll free National Suicide Prevention Hotlines   · National Suicide Prevention Lifeline 051-964-TCZS (9174)  · National Hope Line Network 800-SUICIDE (408-6127)

## 2017-02-24 NOTE — IP AVS SNAPSHOT
2/24/2017          Clau Lerner  [address] [address]    Dear Clau:    [facility] wants to ensure your discharge home is safe and you or your loved ones have had all your questions answered regarding your care after you leave the hospital.    You may receive a telephone call within two days of your discharge.  This call is to make certain you understand your discharge instructions as well as ensure we provided you with the best care possible during your stay with us.     The call will only last approximately 3-5 minutes and will be done by a nurse.    Once again, we want to ensure your discharge home is safe and that you have a clear understanding of any next steps in your care.  If you have any questions or concerns, please do not hesitate to contact us, we are here for you.  Thank you for choosing [facility] for your healthcare needs.    Sincerely,    Tino Berrios    [facility]

## 2017-02-25 LAB — CGA SERPL-MCNC: 37 NG/ML (ref 0–95)

## 2017-02-27 ENCOUNTER — HOSPITAL ENCOUNTER (OUTPATIENT)
Dept: RADIOLOGY | Facility: MEDICAL CENTER | Age: 57
End: 2017-02-27
Attending: INTERNAL MEDICINE
Payer: COMMERCIAL

## 2017-02-27 DIAGNOSIS — C18.0 MALIGNANT NEOPLASM OF CECUM (HCC): ICD-10-CM

## 2017-02-27 PROCEDURE — 700117 HCHG RX CONTRAST REV CODE 255: Performed by: INTERNAL MEDICINE

## 2017-02-27 PROCEDURE — 74177 CT ABD & PELVIS W/CONTRAST: CPT

## 2017-02-27 RX ADMIN — IOHEXOL 100 ML: 350 INJECTION, SOLUTION INTRAVENOUS at 14:09

## 2017-03-01 ENCOUNTER — HOSPITAL ENCOUNTER (OUTPATIENT)
Facility: MEDICAL CENTER | Age: 57
End: 2017-03-01
Attending: INTERNAL MEDICINE
Payer: COMMERCIAL

## 2017-03-01 LAB
ALBUMIN SERPL BCP-MCNC: 3.9 G/DL (ref 3.2–4.9)
ALBUMIN/GLOB SERPL: 1.3 G/DL
ALP SERPL-CCNC: 64 U/L (ref 30–99)
ALT SERPL-CCNC: 14 U/L (ref 2–50)
AMBIGUOUS DTTM AMBI4: NORMAL
ANION GAP SERPL CALC-SCNC: 10 MMOL/L (ref 0–11.9)
AST SERPL-CCNC: 21 U/L (ref 12–45)
BILIRUB SERPL-MCNC: 0.7 MG/DL (ref 0.1–1.5)
BUN SERPL-MCNC: 13 MG/DL (ref 8–22)
CALCIUM SERPL-MCNC: 9.2 MG/DL (ref 8.5–10.5)
CHLORIDE SERPL-SCNC: 102 MMOL/L (ref 96–112)
CO2 SERPL-SCNC: 23 MMOL/L (ref 20–33)
CREAT SERPL-MCNC: 0.85 MG/DL (ref 0.5–1.4)
GLOBULIN SER CALC-MCNC: 3 G/DL (ref 1.9–3.5)
GLUCOSE SERPL-MCNC: 100 MG/DL (ref 65–99)
POTASSIUM SERPL-SCNC: 3.9 MMOL/L (ref 3.6–5.5)
PROT SERPL-MCNC: 6.9 G/DL (ref 6–8.2)
SODIUM SERPL-SCNC: 135 MMOL/L (ref 135–145)

## 2017-03-01 PROCEDURE — 80053 COMPREHEN METABOLIC PANEL: CPT

## 2017-03-05 ENCOUNTER — HOSPITAL ENCOUNTER (EMERGENCY)
Facility: MEDICAL CENTER | Age: 57
End: 2017-03-05
Attending: EMERGENCY MEDICINE
Payer: COMMERCIAL

## 2017-03-05 ENCOUNTER — APPOINTMENT (OUTPATIENT)
Dept: RADIOLOGY | Facility: MEDICAL CENTER | Age: 57
End: 2017-03-05
Attending: EMERGENCY MEDICINE
Payer: COMMERCIAL

## 2017-03-05 VITALS
WEIGHT: 116.84 LBS | SYSTOLIC BLOOD PRESSURE: 132 MMHG | DIASTOLIC BLOOD PRESSURE: 80 MMHG | RESPIRATION RATE: 23 BRPM | BODY MASS INDEX: 21.5 KG/M2 | OXYGEN SATURATION: 98 % | TEMPERATURE: 98.5 F | HEIGHT: 62 IN | HEART RATE: 89 BPM

## 2017-03-05 DIAGNOSIS — R10.9 ACUTE ABDOMINAL PAIN: ICD-10-CM

## 2017-03-05 DIAGNOSIS — K59.03 DRUG-INDUCED CONSTIPATION: ICD-10-CM

## 2017-03-05 DIAGNOSIS — C18.9 MALIGNANT NEOPLASM OF COLON, UNSPECIFIED PART OF COLON (HCC): ICD-10-CM

## 2017-03-05 LAB
ALBUMIN SERPL BCP-MCNC: 4.2 G/DL (ref 3.2–4.9)
ALBUMIN/GLOB SERPL: 1.2 G/DL
ALP SERPL-CCNC: 66 U/L (ref 30–99)
ALT SERPL-CCNC: 11 U/L (ref 2–50)
ANION GAP SERPL CALC-SCNC: 14 MMOL/L (ref 0–11.9)
AST SERPL-CCNC: 20 U/L (ref 12–45)
BASOPHILS # BLD AUTO: 0.3 % (ref 0–1.8)
BASOPHILS # BLD: 0.02 K/UL (ref 0–0.12)
BILIRUB SERPL-MCNC: 1 MG/DL (ref 0.1–1.5)
BUN SERPL-MCNC: 12 MG/DL (ref 8–22)
CALCIUM SERPL-MCNC: 9.4 MG/DL (ref 8.5–10.5)
CHLORIDE SERPL-SCNC: 99 MMOL/L (ref 96–112)
CO2 SERPL-SCNC: 22 MMOL/L (ref 20–33)
CREAT SERPL-MCNC: 0.85 MG/DL (ref 0.5–1.4)
EOSINOPHIL # BLD AUTO: 0.04 K/UL (ref 0–0.51)
EOSINOPHIL NFR BLD: 0.7 % (ref 0–6.9)
ERYTHROCYTE [DISTWIDTH] IN BLOOD BY AUTOMATED COUNT: 42.5 FL (ref 35.9–50)
GFR SERPL CREATININE-BSD FRML MDRD: >60 ML/MIN/1.73 M 2
GLOBULIN SER CALC-MCNC: 3.5 G/DL (ref 1.9–3.5)
GLUCOSE SERPL-MCNC: 104 MG/DL (ref 65–99)
HCT VFR BLD AUTO: 33.4 % (ref 37–47)
HGB BLD-MCNC: 11 G/DL (ref 12–16)
IMM GRANULOCYTES # BLD AUTO: 0.01 K/UL (ref 0–0.11)
IMM GRANULOCYTES NFR BLD AUTO: 0.2 % (ref 0–0.9)
LYMPHOCYTES # BLD AUTO: 0.97 K/UL (ref 1–4.8)
LYMPHOCYTES NFR BLD: 16.2 % (ref 22–41)
MCH RBC QN AUTO: 31.3 PG (ref 27–33)
MCHC RBC AUTO-ENTMCNC: 32.9 G/DL (ref 33.6–35)
MCV RBC AUTO: 94.9 FL (ref 81.4–97.8)
MONOCYTES # BLD AUTO: 0.07 K/UL (ref 0–0.85)
MONOCYTES NFR BLD AUTO: 1.2 % (ref 0–13.4)
NEUTROPHILS # BLD AUTO: 4.89 K/UL (ref 2–7.15)
NEUTROPHILS NFR BLD: 81.4 % (ref 44–72)
NRBC # BLD AUTO: 0 K/UL
NRBC BLD AUTO-RTO: 0 /100 WBC
PLATELET # BLD AUTO: 216 K/UL (ref 164–446)
PMV BLD AUTO: 10.8 FL (ref 9–12.9)
POTASSIUM SERPL-SCNC: 3.9 MMOL/L (ref 3.6–5.5)
PROT SERPL-MCNC: 7.7 G/DL (ref 6–8.2)
RBC # BLD AUTO: 3.52 M/UL (ref 4.2–5.4)
SODIUM SERPL-SCNC: 135 MMOL/L (ref 135–145)
WBC # BLD AUTO: 6 K/UL (ref 4.8–10.8)

## 2017-03-05 PROCEDURE — 80053 COMPREHEN METABOLIC PANEL: CPT

## 2017-03-05 PROCEDURE — 700102 HCHG RX REV CODE 250 W/ 637 OVERRIDE(OP): Performed by: EMERGENCY MEDICINE

## 2017-03-05 PROCEDURE — 96376 TX/PRO/DX INJ SAME DRUG ADON: CPT

## 2017-03-05 PROCEDURE — 74177 CT ABD & PELVIS W/CONTRAST: CPT

## 2017-03-05 PROCEDURE — 96375 TX/PRO/DX INJ NEW DRUG ADDON: CPT

## 2017-03-05 PROCEDURE — 96374 THER/PROPH/DIAG INJ IV PUSH: CPT

## 2017-03-05 PROCEDURE — 700111 HCHG RX REV CODE 636 W/ 250 OVERRIDE (IP): Performed by: EMERGENCY MEDICINE

## 2017-03-05 PROCEDURE — A9270 NON-COVERED ITEM OR SERVICE: HCPCS | Performed by: EMERGENCY MEDICINE

## 2017-03-05 PROCEDURE — 700117 HCHG RX CONTRAST REV CODE 255: Performed by: EMERGENCY MEDICINE

## 2017-03-05 PROCEDURE — 99284 EMERGENCY DEPT VISIT MOD MDM: CPT

## 2017-03-05 PROCEDURE — 85025 COMPLETE CBC W/AUTO DIFF WBC: CPT

## 2017-03-05 RX ORDER — MORPHINE SULFATE 4 MG/ML
4 INJECTION, SOLUTION INTRAMUSCULAR; INTRAVENOUS ONCE
Status: COMPLETED | OUTPATIENT
Start: 2017-03-05 | End: 2017-03-05

## 2017-03-05 RX ORDER — ONDANSETRON 2 MG/ML
4 INJECTION INTRAMUSCULAR; INTRAVENOUS ONCE
Status: COMPLETED | OUTPATIENT
Start: 2017-03-05 | End: 2017-03-05

## 2017-03-05 RX ADMIN — MAGNESIUM CITRATE 296 ML: 1.75 LIQUID ORAL at 21:35

## 2017-03-05 RX ADMIN — IOHEXOL 100 ML: 350 INJECTION, SOLUTION INTRAVENOUS at 20:31

## 2017-03-05 RX ADMIN — HYDROMORPHONE HYDROCHLORIDE 0.5 MG: 1 INJECTION, SOLUTION INTRAMUSCULAR; INTRAVENOUS; SUBCUTANEOUS at 21:15

## 2017-03-05 RX ADMIN — MORPHINE SULFATE 4 MG: 4 INJECTION INTRAVENOUS at 20:47

## 2017-03-05 RX ADMIN — ONDANSETRON 4 MG: 2 INJECTION, SOLUTION INTRAMUSCULAR; INTRAVENOUS at 19:00

## 2017-03-05 RX ADMIN — ONDANSETRON 4 MG: 2 INJECTION, SOLUTION INTRAMUSCULAR; INTRAVENOUS at 20:47

## 2017-03-05 RX ADMIN — MORPHINE SULFATE 4 MG: 4 INJECTION INTRAVENOUS at 19:00

## 2017-03-05 ASSESSMENT — LIFESTYLE VARIABLES: DO YOU DRINK ALCOHOL: NO

## 2017-03-05 ASSESSMENT — PAIN SCALES - GENERAL: PAINLEVEL_OUTOF10: 10

## 2017-03-05 NOTE — ED AVS SNAPSHOT
TheLadders Access Code: Activation code not generated  Current TheLadders Status: Active    Seesmichart  A secure, online tool to manage your health information     [employer]’s TheLadders® is a secure, online tool that connects you to your personalized health information from the privacy of your home -- day or night - making it very easy for you to manage your healthcare. Once the activation process is completed, you can even access your medical information using the TheLadders toro, which is available for free in the Apple Toro store or Google Play store.     TheLadders provides the following levels of access (as shown below):   My Chart Features   [facility] Primary Care Doctor [facility]  Specialists [facility]  Urgent  Care Non-[facility]  Primary Care  Doctor   Email your healthcare team securely and privately 24/7 X X X X   Manage appointments: schedule your next appointment; view details of past/upcoming appointments X      Request prescription refills. X      View recent personal medical records, including lab and immunizations X X X X   View health record, including health history, allergies, medications X X X X   Read reports about your outpatient visits, procedures, consult and ER notes X X X X   See your discharge summary, which is a recap of your hospital and/or ER visit that includes your diagnosis, lab results, and care plan. X X       How to register for TheLadders:  1. Go to  [email or web address].  2. Click on the Sign Up Now box, which takes you to the New Member Sign Up page. You will need to provide the following information:  a. Enter your TheLadders Access Code exactly as it appears at the top of this page. (You will not need to use this code after you’ve completed the sign-up process. If you do not sign up before the expiration date, you must request a new code.)   b. Enter your date of birth.   c. Enter your home email address.   d. Click Submit, and follow the next screen’s instructions.  3. Create a TheLadders ID. This will  be your HookLogic login ID and cannot be changed, so think of one that is secure and easy to remember.  4. Create a HookLogic password. You can change your password at any time.  5. Enter your Password Reset Question and Answer. This can be used at a later time if you forget your password.   6. Enter your e-mail address. This allows you to receive e-mail notifications when new information is available in HookLogic.  7. Click Sign Up. You can now view your health information.    For assistance activating your HookLogic account, call [phone]

## 2017-03-05 NOTE — ED AVS SNAPSHOT
Home Care Instructions                                                                                                                Clau Lerner   MRN: [id number]    Department:  [facility], Emergency Dept   Date of Visit:  3/5/2017            [facility], Emergency Dept    [address] [place] [id number]    Phone:  [phone]      You were seen by     1. Fabricio Myers M.D.    2. Jim Vargas M.D.      Your Diagnosis Was     Acute abdominal pain     R10.9       These are the medications you received during your hospitalization from 03/05/2017 1746 to 03/05/2017 2134     Date/Time Order Dose Route Action    03/05/2017 1900 morphine (pf) 4 mg/ml injection 4 mg 4 mg Intravenous Given    03/05/2017 1900 ondansetron (ZOFRAN) syringe/vial injection 4 mg 4 mg Intravenous Given    03/05/2017 2031 iohexol (OMNIPAQUE) 350 mg/mL 100 mL Intravenous Given    03/05/2017 2047 morphine (pf) 4 mg/ml injection 4 mg 4 mg Intravenous Given    03/05/2017 2047 ondansetron (ZOFRAN) syringe/vial injection 4 mg 4 mg Intravenous Given    03/05/2017 2115 HYDROmorphone (DILAUDID) injection 0.5 mg 0.5 mg Intravenous Given      Follow-up Information     1. Follow up with [facility], Emergency Dept.    Specialty:  Emergency Medicine    Why:  As needed    Contact information    [address] [place] [id number] [phone]      Medication Information     Review all of your home medications and newly ordered medications with your primary doctor and/or pharmacist as soon as possible. Follow medication instructions as directed by your doctor and/or pharmacist.     Please keep your complete medication list with you and share with your physician. Update the information when medications are discontinued, doses are changed, or new medications (including over-the-counter products) are added; and carry medication information at all times in the event of emergency  situations.               Medication List      ASK your doctor about these medications        Instructions    ibuprofen 200 MG Tabs   Commonly known as:  MOTRIN    Take 200 mg by mouth as needed.   Dose:  200 mg               Procedures and tests performed during your visit     CBC WITH DIFFERENTIAL    COMP METABOLIC PANEL    CONSENT FOR CONTRAST INJECTION    CT-ABDOMEN-PELVIS WITH    ESTIMATED GFR    INSERT IV            Patient Information     Patient Information    Following emergency treatment: all patient requiring follow-up care must return either to a private physician or a clinic if your condition worsens before you are able to obtain further medical attention, please return to the emergency room.     Billing Information    At [facility], we work to make the billing process streamlined for our patients.  Our Representatives are here to answer any questions you may have regarding your hospital bill.  If you have insurance coverage and have supplied your insurance information to us, we will submit a claim to your insurer on your behalf.  Should you have any questions regarding your bill, we can be reached online or by phone as follows:  Online: You are able pay your bills online or live chat with our representatives about any billing questions you may have. We are here to help Monday - Friday from 8:00am to 7:30pm and 9:00am - 12:00pm on Saturdays.  Please visit [email or web address]  for more information.   Phone:  [phone] or [phone]    Please note that your emergency physician, surgeon, pathologist, radiologist, anesthesiologist, and other specialists are not employed by [facility] and will therefore bill separately for their services.  Please contact them directly for any questions concerning their bills at the numbers below:     Emergency Physician Services:  [phone]  [facility]:  [phone]  [facility]:   [phone]  [facility]:  [phone]    1. Your final bill may vary from the amount quoted upon discharge if all procedures are not complete at that time, or if your doctor has additional procedures of which we are not aware. You will receive an additional bill if you return to the Emergency Department at [facility] for suture removal regardless of the facility of which the sutures were placed.     2. Please arrange for settlement of this account at the emergency registration.    3. All self-pay accounts are due in full at the time of treatment.  If you are unable to meet this obligation then payment is expected within 4-5 days.     4. If you have had radiology studies (CT, X-ray, Ultrasound, MRI), you have received a preliminary result during your emergency department visit. Please contact the radiology department [phone] to receive a copy of your final result. Please discuss the Final result with your primary physician or with the follow up physician provided.     Crisis Hotline:  [place] Crisis Hotline:  5-021-PELOVEV or 1-918.638.9737  [place] Crisis Hotline:    1-432.950.8037 or 431-235-6926         ED Discharge Follow Up Questions    1. In order to provide you with very good care, we would like to follow up with a phone call in the next few days.  May we have your permission to contact you?     YES /  NO    2. What is the best phone number to call you? (       )_____-__________    3. What is the best time to call you?      Morning  /  Afternoon  /  Evening                   Patient Signature:  ____________________________________________________________    Date:  ____________________________________________________________

## 2017-03-05 NOTE — ED AVS SNAPSHOT
3/5/2017          Clau Lerner  [address] [address]    Dear Clau:    Critical access hospital wants to ensure your discharge home is safe and you or your loved ones have had all your questions answered regarding your care after you leave the hospital.    You may receive a telephone call within two days of your discharge.  This call is to make certain you understand your discharge instructions as well as ensure we provided you with the best care possible during your stay with us.     The call will only last approximately 3-5 minutes and will be done by a nurse.    Once again, we want to ensure your discharge home is safe and that you have a clear understanding of any next steps in your care.  If you have any questions or concerns, please do not hesitate to contact us, we are here for you.  Thank you for choosing [facility] for your healthcare needs.    Sincerely,    Tino Berrios    [facility]

## 2017-03-06 NOTE — ED NOTES
Pt reports abdominal pain, pt recently dx with colon CA. Pt has large mass to abdomen. Pt has not had surgical intervention. Pt undergoing chemotherapy at this time. Mask in place. Pt to senior lounge. Charge RN aware.

## 2017-03-06 NOTE — ED PROVIDER NOTES
ED Provider Note    HPI: Patient is a 56-year-old female who presented to the emergency department March 5, 2017 5:46 PM with a chief complaint of abdominal pain.    I spoke with the patient's oncologist. 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 The patient is developed pain in both lower quadrants. The patient has not had surgery at this time. She has not had a fever chills or cough. She denied chest pain or shortness of breath. She does feel very weak and tired. No other somatic complaints.    Review of Systems: Positive bilateral lower quadrant abdominal pain negative chest pain shortness of breath fever chills cough. Review of systems reviewed with patient, all other systems negative    Past medical/surgical history: Colon cancer    Medications: Motrin    Allergies: Sulfa     Social History:  remote history of smoking occasional use of alcohol      Physical exam: Constitutional: Well-developed well-nourished female awake alert appeared somewhat uncomfortable.  Vital signs:  Temperature 98.5 pulse 100 respirations 16 blood pressure 132 over 80 pulse oximetry 96%  EYES: PERRL, EOMI, Conjunctivae and sclera normal, eyelids normal bilaterally.  Neck: Trachea midline. No cervical masses seen or palpated. Normal range of motion, supple. No meningeal signs elicited.  Cardiac: Regular rate and rhythm. S1-S2 present. No S3 or S4 present. No murmurs, rubs, or gallops heard. No edema or varicosities were seen.   Lungs: Clear to auscultation with good aeration throughout. No wheezes, rales, or rhonchi heard. Patient's chest wall moved symmetrically with each respiratory effort. Patient was not making use of accessory muscles of respiration in breathing.  Abdomen: Tender in both lower quadrants. No rebound or guarding elicited. No organomegaly or pulsatile abdominal masses identified.  Musculoskeletal:  no  pain with palpitation or movement of muscle,  bone or joint , no obvious musculoskeletal deformities identified.  Neurologic: alert and awake answers questions appropriately. Moves all four extremities independently, no gross focal abnormalities identified. Normal strength and motor.  Skin: no rash or lesion seen, no palpable dermatologic lesions identified.  Psychiatric: Patient appeared to be slightly anxious. The patient was not delusional or hallucinating.    Medical decision making:  Laboratory studies were obtained (please see labsheet follow results) significant findings included an unremarkable chemistry panel white count of 6.0 with 81% neutrophils, no evidence of neutropenia    CT scan of the abdomen with IV and by mouth contrast obtained; a large central pelvic masses noted. This is essentially unchanged from previous. Slight increase in left-sided hydronephrosis is noted. No evidence of free air obstruction seen. Constipation is seen.    I spoke with the patient's oncology group. They felt magnesium citrate would be reasonable. The patient will call him tomorrow for follow-up. The patient has no evidence of bowel obstruction free air or other significant acute abnormality. Patient has no evidence of renal failure or other significant laboratory abnormalities. Outpatient follow-up seems reasonable.    Patient given the usual discharge instructions for acute abdominal pain. Patient carefully counseled to return immediately for increasing pain fever vomiting or any other problems    Patient verbalized understanding of these instructions and states she will comply    Impression acute abdominal pain  #2) constipation

## 2017-03-07 ENCOUNTER — PATIENT OUTREACH (OUTPATIENT)
Dept: HEALTH INFORMATION MANAGEMENT | Facility: OTHER | Age: 57
End: 2017-03-07

## 2017-03-08 ENCOUNTER — HOSPITAL ENCOUNTER (OUTPATIENT)
Dept: RADIOLOGY | Facility: MEDICAL CENTER | Age: 57
End: 2017-03-08
Attending: INTERNAL MEDICINE
Payer: COMMERCIAL

## 2017-03-08 DIAGNOSIS — C18.0 MALIGNANT NEOPLASM OF CECUM (HCC): ICD-10-CM

## 2017-03-08 PROCEDURE — 76775 US EXAM ABDO BACK WALL LIM: CPT

## 2017-03-09 ENCOUNTER — APPOINTMENT (OUTPATIENT)
Dept: ADMISSIONS | Facility: MEDICAL CENTER | Age: 57
End: 2017-03-09
Payer: COMMERCIAL

## 2017-03-09 DIAGNOSIS — Z01.812 PRE-OPERATIVE LABORATORY EXAMINATION: ICD-10-CM

## 2017-03-09 LAB
AMORPH CRY #/AREA URNS HPF: PRESENT /HPF
APPEARANCE UR: ABNORMAL
BACTERIA #/AREA URNS HPF: ABNORMAL /HPF
BILIRUB UR QL STRIP.AUTO: NEGATIVE
COLOR UR: YELLOW
CULTURE IF INDICATED INDCX: YES UA CULTURE
EPI CELLS #/AREA URNS HPF: ABNORMAL /HPF
GLUCOSE UR STRIP.AUTO-MCNC: NEGATIVE MG/DL
GRAN CASTS #/AREA URNS LPF: ABNORMAL /LPF
HYALINE CASTS #/AREA URNS LPF: ABNORMAL /LPF
KETONES UR STRIP.AUTO-MCNC: 40 MG/DL
LEUKOCYTE ESTERASE UR QL STRIP.AUTO: NEGATIVE
MICRO URNS: ABNORMAL
MUCOUS THREADS #/AREA URNS HPF: ABNORMAL /HPF
NITRITE UR QL STRIP.AUTO: NEGATIVE
PH UR STRIP.AUTO: 6 [PH]
PROT UR QL STRIP: 30 MG/DL
RBC # URNS HPF: ABNORMAL /HPF
RBC UR QL AUTO: NEGATIVE
SP GR UR STRIP.AUTO: 1.02
WBC #/AREA URNS HPF: ABNORMAL /HPF

## 2017-03-09 PROCEDURE — 81001 URINALYSIS AUTO W/SCOPE: CPT

## 2017-03-09 PROCEDURE — 87086 URINE CULTURE/COLONY COUNT: CPT

## 2017-03-09 RX ORDER — POLYETHYLENE GLYCOL 3350 17 G/17G
17 POWDER, FOR SOLUTION ORAL DAILY
Status: ON HOLD | COMMUNITY
End: 2017-08-24

## 2017-03-09 RX ORDER — OXYCODONE HYDROCHLORIDE 10 MG/1
10 TABLET ORAL EVERY 6 HOURS PRN
Status: ON HOLD | COMMUNITY
End: 2018-01-10

## 2017-03-09 RX ORDER — MORPHINE SULFATE 200 MG/1
1 TABLET, FILM COATED, EXTENDED RELEASE ORAL 2 TIMES DAILY
Status: ON HOLD | COMMUNITY
End: 2017-08-24

## 2017-03-10 ENCOUNTER — HOSPITAL ENCOUNTER (OUTPATIENT)
Facility: MEDICAL CENTER | Age: 57
End: 2017-03-10
Attending: UROLOGY | Admitting: UROLOGY
Payer: COMMERCIAL

## 2017-03-10 ENCOUNTER — APPOINTMENT (OUTPATIENT)
Dept: RADIOLOGY | Facility: MEDICAL CENTER | Age: 57
End: 2017-03-10
Attending: UROLOGY
Payer: COMMERCIAL

## 2017-03-10 VITALS
TEMPERATURE: 9.8 F | HEIGHT: 62 IN | HEART RATE: 96 BPM | OXYGEN SATURATION: 95 % | WEIGHT: 115.3 LBS | BODY MASS INDEX: 21.22 KG/M2 | RESPIRATION RATE: 13 BRPM

## 2017-03-10 PROBLEM — N13.30 HYDRONEPHROSIS: Status: ACTIVE | Noted: 2017-03-10

## 2017-03-10 PROCEDURE — 160025 RECOVERY II MINUTES (STATS): Performed by: UROLOGY

## 2017-03-10 PROCEDURE — 160035 HCHG PACU - 1ST 60 MINS PHASE I: Performed by: UROLOGY

## 2017-03-10 PROCEDURE — 160046 HCHG PACU - 1ST 60 MINS PHASE II: Performed by: UROLOGY

## 2017-03-10 PROCEDURE — 700101 HCHG RX REV CODE 250

## 2017-03-10 PROCEDURE — 501329 HCHG SET, CYSTO IRRIG Y TUR: Performed by: UROLOGY

## 2017-03-10 PROCEDURE — 160047 HCHG PACU  - EA ADDL 30 MINS PHASE II: Performed by: UROLOGY

## 2017-03-10 PROCEDURE — 502240 HCHG MISC OR SUPPLY RC 0272: Performed by: UROLOGY

## 2017-03-10 PROCEDURE — 160002 HCHG RECOVERY MINUTES (STAT): Performed by: UROLOGY

## 2017-03-10 PROCEDURE — A4357 BEDSIDE DRAINAGE BAG: HCPCS | Performed by: UROLOGY

## 2017-03-10 PROCEDURE — 160009 HCHG ANES TIME/MIN: Performed by: UROLOGY

## 2017-03-10 PROCEDURE — 700111 HCHG RX REV CODE 636 W/ 250 OVERRIDE (IP)

## 2017-03-10 PROCEDURE — 110382 HCHG SHELL REV 271: Performed by: UROLOGY

## 2017-03-10 PROCEDURE — 110371 HCHG SHELL REV 272: Performed by: UROLOGY

## 2017-03-10 PROCEDURE — C1769 GUIDE WIRE: HCPCS | Performed by: UROLOGY

## 2017-03-10 PROCEDURE — 500879 HCHG PACK, CYSTO: Performed by: UROLOGY

## 2017-03-10 PROCEDURE — 160028 HCHG SURGERY MINUTES - 1ST 30 MINS LEVEL 3: Performed by: UROLOGY

## 2017-03-10 PROCEDURE — A4606 OXYGEN PROBE USED W OXIMETER: HCPCS | Performed by: UROLOGY

## 2017-03-10 PROCEDURE — C2617 STENT, NON-COR, TEM W/O DEL: HCPCS | Performed by: UROLOGY

## 2017-03-10 PROCEDURE — 160048 HCHG OR STATISTICAL LEVEL 1-5: Performed by: UROLOGY

## 2017-03-10 DEVICE — STENT UROLOGICAL POLARIS 6X22  ULTRA: Type: IMPLANTABLE DEVICE | Status: FUNCTIONAL

## 2017-03-10 RX ORDER — LIDOCAINE HYDROCHLORIDE 10 MG/ML
INJECTION, SOLUTION INFILTRATION; PERINEURAL
Status: COMPLETED
Start: 2017-03-10 | End: 2017-03-10

## 2017-03-10 RX ORDER — PHENAZOPYRIDINE HYDROCHLORIDE 200 MG/1
200 TABLET, FILM COATED ORAL 3 TIMES DAILY PRN
Qty: 21 TAB | Refills: 0 | Status: ON HOLD | OUTPATIENT
Start: 2017-03-10 | End: 2017-08-24

## 2017-03-10 RX ORDER — SODIUM CHLORIDE, SODIUM LACTATE, POTASSIUM CHLORIDE, CALCIUM CHLORIDE 600; 310; 30; 20 MG/100ML; MG/100ML; MG/100ML; MG/100ML
INJECTION, SOLUTION INTRAVENOUS CONTINUOUS
Status: DISCONTINUED | OUTPATIENT
Start: 2017-03-10 | End: 2017-03-10 | Stop reason: HOSPADM

## 2017-03-10 RX ORDER — PROMETHAZINE HYDROCHLORIDE 25 MG/1
25 SUPPOSITORY RECTAL EVERY 4 HOURS PRN
Status: DISCONTINUED | OUTPATIENT
Start: 2017-03-10 | End: 2017-03-10 | Stop reason: HOSPADM

## 2017-03-10 RX ORDER — ONDANSETRON 2 MG/ML
4 INJECTION INTRAMUSCULAR; INTRAVENOUS EVERY 4 HOURS PRN
Status: DISCONTINUED | OUTPATIENT
Start: 2017-03-10 | End: 2017-03-10 | Stop reason: HOSPADM

## 2017-03-10 RX ORDER — SODIUM CHLORIDE, SODIUM LACTATE, POTASSIUM CHLORIDE, CALCIUM CHLORIDE 600; 310; 30; 20 MG/100ML; MG/100ML; MG/100ML; MG/100ML
1000 INJECTION, SOLUTION INTRAVENOUS
Status: COMPLETED | OUTPATIENT
Start: 2017-03-10 | End: 2017-03-10

## 2017-03-10 RX ORDER — PROMETHAZINE HYDROCHLORIDE 25 MG/1
12.5 SUPPOSITORY RECTAL EVERY 4 HOURS PRN
Status: DISCONTINUED | OUTPATIENT
Start: 2017-03-10 | End: 2017-03-10 | Stop reason: HOSPADM

## 2017-03-10 RX ADMIN — LIDOCAINE HYDROCHLORIDE: 10 INJECTION, SOLUTION INFILTRATION; PERINEURAL at 06:30

## 2017-03-10 RX ADMIN — SODIUM CHLORIDE, SODIUM LACTATE, POTASSIUM CHLORIDE, CALCIUM CHLORIDE 1000 ML: 600; 310; 30; 20 INJECTION, SOLUTION INTRAVENOUS at 06:43

## 2017-03-10 ASSESSMENT — PAIN SCALES - GENERAL
PAINLEVEL_OUTOF10: 0
PAINLEVEL_OUTOF10: 5
PAINLEVEL_OUTOF10: 0
PAINLEVEL_OUTOF10: 0

## 2017-03-10 NOTE — DISCHARGE INSTRUCTIONS
ACTIVITY: Rest and take it easy for the first 24 hours.  A responsible adult is recommended to remain with you during that time.  It is normal to feel sleepy.  We encourage you to not do anything that requires balance, judgment or coordination.    MILD FLU-LIKE SYMPTOMS ARE NORMAL. YOU MAY EXPERIENCE GENERALIZED MUSCLE ACHES, THROAT IRRITATION, HEADACHE AND/OR SOME NAUSEA.    FOR 24 HOURS DO NOT:  Drive, operate machinery or run household appliances.  Drink beer or alcoholic beverages.   Make important decisions or sign legal documents.    Cystoscopy, Care After  Refer to this sheet in the next few weeks. These instructions provide you with information on caring for yourself after your procedure. Your caregiver may also give you more specific instructions. Your treatment has been planned according to current medical practices, but problems sometimes occur. Call your caregiver if you have any problems or questions after your procedure.  HOME CARE INSTRUCTIONS   Things you can do to ease any discomfort after your procedure include:  · Drinking enough water and fluids to keep your urine clear or pale yellow.  · Taking a warm bath to relieve any burning feelings.  SEEK IMMEDIATE MEDICAL CARE IF:   · You have an increase in blood in your urine.  · You notice blood clots in your urine.  · You have difficulty passing urine.  · You have the chills.  · You have abdominal pain.  · You have a fever or persistent symptoms for more than 2-3 days.  · You have a fever and your symptoms suddenly get worse.  MAKE SURE YOU:   · Understand these instructions.  · Will watch your condition.  · Will get help right away if you are not doing well or get worse.     This information is not intended to replace advice given to you by your health care provider. Make sure you discuss any questions you have with your health care provider.     Document Released: 07/07/2006 Document Revised: 01/08/2016 Document Reviewed: 06/10/2013  [employer]  Interactive Patient Education ©2016 [employer]    DIET: To avoid nausea, slowly advance diet as tolerated, avoiding spicy or greasy foods for the first day.  Add more substantial food to your diet according to your physician's instructions.  Babies can be fed formula or breast milk as soon as they are hungry.  INCREASE FLUIDS AND FIBER TO AVOID CONSTIPATION.      FOLLOW-UP APPOINTMENT:  A follow-up appointment should be arranged with your doctor as discussed with Dr Lubin; call to schedule.    You should CALL YOUR PHYSICIAN if you develop:  Fever greater than 101 degrees F.  Pain not relieved by medication, or persistent nausea or vomiting.  Excessive bleeding (blood soaking through dressing) or unexpected drainage from the wound.  Extreme redness or swelling around the incision site, drainage of pus or foul smelling drainage.  Inability to urinate or empty your bladder within 8 hours.  Problems with breathing or chest pain.    You should call 911 if you develop problems with breathing or chest pain.  If you are unable to contact your doctor or surgical center, you should go to the nearest emergency room or urgent care center.  Physician's telephone #: Dr Lubin [phone]        If any questions arise, call your doctor.  If your doctor is not available, please feel free to call the Surgical Center at [phone].  The Center is open Monday through Friday from 7AM to 7PM.  You can also call the [facility] HOTLINE open 24 hours/day, 7 days/week and speak to a nurse at [phone], or toll free at [phone].    A registered nurse may call you a few days after your surgery to see how you are doing after your procedure.    MEDICATIONS: Resume taking daily medication.  Take prescribed pain medication with food.  If no medication is prescribed, you may take non-aspirin pain medication if needed.  PAIN MEDICATION CAN BE VERY CONSTIPATING.  Take a stool softener or laxative such as senokot, pericolace, or milk  of magnesia if needed.    Prescription given for Pyridium.  If your physician has prescribed pain medication that includes Acetaminophen (Tylenol), do not take additional Acetaminophen (Tylenol) while taking the prescribed medication.    Depression / Suicide Risk    As you are discharged from this [facility] facility, it is important to learn how to keep safe from harming yourself.    Recognize the warning signs:  · Abrupt changes in personality, positive or negative- including increase in energy   · Giving away possessions  · Change in eating patterns- significant weight changes-  positive or negative  · Change in sleeping patterns- unable to sleep or sleeping all the time   · Unwillingness or inability to communicate  · Depression  · Unusual sadness, discouragement and loneliness  · Talk of wanting to die  · Neglect of personal appearance   · Rebelliousness- reckless behavior  · Withdrawal from people/activities they love  · Confusion- inability to concentrate     If you or a loved one observes any of these behaviors or has concerns about self-harm, here's what you can do:  · Talk about it- your feelings and reasons for harming yourself  · Remove any means that you might use to hurt yourself (examples: pills, rope, extension cords, firearm)  · Get professional help from the community (Mental Health, Substance Abuse, psychological counseling)  · Do not be alone:Call your Safe Contact- someone whom you trust who will be there for you.  · Call your local CRISIS HOTLINE 966-4982 or 650-296-6105  · Call your local Children's Mobile Crisis Response Team [place] (260) 282-6871 or www.GoSquared  · Call the toll free National Suicide Prevention Hotlines   · National Suicide Prevention Lifeline 375-089-SXTR (8656)  · National Hope Line Network 800-SUICIDE (572-8866)

## 2017-03-10 NOTE — IP AVS SNAPSHOT
" Home Care Instructions                                                                                                                Name:Clau Lerner  Medical Record Number:[id number]  CSN: [id number]    YOB: 1960   Age: 56 y.o.  Sex: female  HT:1.575 m (5' 2\") WT: 52.3 kg (115 lb 4.8 oz)          Admit Date: 3/10/2017     Discharge Date:   Today's Date: 3/10/2017  Attending Doctor:  Johnathan Lubin M.D.                  Allergies:  Sulfa drugs                Discharge Instructions         ACTIVITY: Rest and take it easy for the first 24 hours.  A responsible adult is recommended to remain with you during that time.  It is normal to feel sleepy.  We encourage you to not do anything that requires balance, judgment or coordination.    MILD FLU-LIKE SYMPTOMS ARE NORMAL. YOU MAY EXPERIENCE GENERALIZED MUSCLE ACHES, THROAT IRRITATION, HEADACHE AND/OR SOME NAUSEA.    FOR 24 HOURS DO NOT:  Drive, operate machinery or run household appliances.  Drink beer or alcoholic beverages.   Make important decisions or sign legal documents.    Cystoscopy, Care After  Refer to this sheet in the next few weeks. These instructions provide you with information on caring for yourself after your procedure. Your caregiver may also give you more specific instructions. Your treatment has been planned according to current medical practices, but problems sometimes occur. Call your caregiver if you have any problems or questions after your procedure.  HOME CARE INSTRUCTIONS   Things you can do to ease any discomfort after your procedure include:  · Drinking enough water and fluids to keep your urine clear or pale yellow.  · Taking a warm bath to relieve any burning feelings.  SEEK IMMEDIATE MEDICAL CARE IF:   · You have an increase in blood in your urine.  · You notice blood clots in your urine.  · You have difficulty passing urine.  · You have the chills.  · You have abdominal pain.  · You have a fever or persistent symptoms for " more than 2-3 days.  · You have a fever and your symptoms suddenly get worse.  MAKE SURE YOU:   · Understand these instructions.  · Will watch your condition.  · Will get help right away if you are not doing well or get worse.     This information is not intended to replace advice given to you by your health care provider. Make sure you discuss any questions you have with your health care provider.     Document Released: 07/07/2006 Document Revised: 01/08/2016 Document Reviewed: 06/10/2013  [employer] Patient Education ©2016 [employer].    DIET: To avoid nausea, slowly advance diet as tolerated, avoiding spicy or greasy foods for the first day.  Add more substantial food to your diet according to your physician's instructions.  Babies can be fed formula or breast milk as soon as they are hungry.  INCREASE FLUIDS AND FIBER TO AVOID CONSTIPATION.      FOLLOW-UP APPOINTMENT:  A follow-up appointment should be arranged with your doctor as discussed with Dr Lubin; call to schedule.    You should CALL YOUR PHYSICIAN if you develop:  Fever greater than 101 degrees F.  Pain not relieved by medication, or persistent nausea or vomiting.  Excessive bleeding (blood soaking through dressing) or unexpected drainage from the wound.  Extreme redness or swelling around the incision site, drainage of pus or foul smelling drainage.  Inability to urinate or empty your bladder within 8 hours.  Problems with breathing or chest pain.    You should call 911 if you develop problems with breathing or chest pain.  If you are unable to contact your doctor or surgical center, you should go to the nearest emergency room or urgent care center.  Physician's telephone #: Dr Lubin [phone]        If any questions arise, call your doctor.  If your doctor is not available, please feel free to call the Surgical Center at [phone].  The Center is open Monday through Friday from 7AM to 7PM.  You can also call the [employer] HOTLINE  open 24 hours/day, 7 days/week and speak to a nurse at [phone], or toll free at [phone].    A registered nurse may call you a few days after your surgery to see how you are doing after your procedure.    MEDICATIONS: Resume taking daily medication.  Take prescribed pain medication with food.  If no medication is prescribed, you may take non-aspirin pain medication if needed.  PAIN MEDICATION CAN BE VERY CONSTIPATING.  Take a stool softener or laxative such as senokot, pericolace, or milk of magnesia if needed.    Prescription given for Pyridium.  If your physician has prescribed pain medication that includes Acetaminophen (Tylenol), do not take additional Acetaminophen (Tylenol) while taking the prescribed medication.    Depression / Suicide Risk    As you are discharged from this [facility] facility, it is important to learn how to keep safe from harming yourself.    Recognize the warning signs:  · Abrupt changes in personality, positive or negative- including increase in energy   · Giving away possessions  · Change in eating patterns- significant weight changes-  positive or negative  · Change in sleeping patterns- unable to sleep or sleeping all the time   · Unwillingness or inability to communicate  · Depression  · Unusual sadness, discouragement and loneliness  · Talk of wanting to die  · Neglect of personal appearance   · Rebelliousness- reckless behavior  · Withdrawal from people/activities they love  · Confusion- inability to concentrate     If you or a loved one observes any of these behaviors or has concerns about self-harm, here's what you can do:  · Talk about it- your feelings and reasons for harming yourself  · Remove any means that you might use to hurt yourself (examples: pills, rope, extension cords, firearm)  · Get professional help from the community (Mental Health, Substance Abuse, psychological counseling)  · Do not be alone:Call your Safe Contact- someone whom you trust who  will be there for you.  · Call your local CRISIS HOTLINE 128-4272 or 963-322-3189  · Call your local Children's Mobile Crisis Response Team [place] (096) 894-5820 or www.[employer]  · Call the toll free National Suicide Prevention Hotlines   · National Suicide Prevention Lifeline 542-528-YFMC (7135)  · [employer] Hope Line Network 800-SUICIDE (734-7407)       Medication List      START taking these medications        Instructions    phenazopyridine 200 MG Tabs   Commonly known as:  PYRIDIUM    Take 1 Tab by mouth 3 times a day as needed (painful urination).   Dose:  200 mg         CONTINUE taking these medications        Instructions    Morphine Sulfate  MG Tbcr    Take 1 Tab by mouth 2 Times a Day.   Dose:  1 Tab       NON SPECIFIED    Patient has chemo every other Wednesday, last chemo per pt was 3/1/17.  Pt reports Irinoteran given per infusion, then Fluorouracil given via pump after infusion.       oxycodone immediate release 10 MG immediate release tablet   Commonly known as:  ROXICODONE    Take 10 mg by mouth every 8 hours.   Dose:  10 mg       polyethylene glycol/lytes Pack   Commonly known as:  MIRALAX    Take 17 g by mouth every day.   Dose:  17 g               Medication Information     Above and/or attached are the medications your physician expects you to take upon discharge. Review all of your home medications and newly ordered medications with your doctor and/or pharmacist. Follow medication instructions as directed by your doctor and/or pharmacist. Please keep your medication list with you and share with your physician. Update the information when medications are discontinued, doses are changed, or new medications (including over-the-counter products) are added; and carry medication information at all times in the event of emergency situations.        Resources     Quit Smoking / Tobacco Use:    I understand the use of any tobacco products increases my chance of suffering from future  heart disease or stroke and could cause other illnesses which may shorten my life. Quitting the use of tobacco products is the single most important thing I can do to improve my health. For further information on smoking / tobacco cessation call a Toll Free Quit Line at 1-454.550.7568 (*National Cancer [place]) or 1-114.115.1637 (American Lung Association) or you can access the web based program at www.lungusa.org.    [place] Tobacco Users Help Line:  (205) 104-9338       Toll Free: 1-477.927.8950  Quit Tobacco Program [facility] (179)684-2731    Crisis Hotline:    [place] Crisis Hotline:  6-308-SOZBGBN or 1-818.940.4405    [place] Crisis Hotline:    1-419.780.7251 or 833-269-2379    Discharge Survey:   Thank you for choosing [facility]. We hope we did everything we could to make your hospital stay a pleasant one. You may be receiving a survey and we would appreciate your time and participation in answering the questions. Your input is very valuable to us in our efforts to improve our service to our patients and their families.            Signatures     My signature on this form indicates that:    1. I acknowledge receipt and understanding of these Home Care Instruction.  2. My questions regarding this information have been answered to my satisfaction.  3. I have formulated a plan with my discharge nurse to obtain my prescribed medications for home.    __________________________________      __________________________________                   Patient Signature                                 Guardian/Responsible Adult Signature      __________________________________                 __________       ________                       Nurse Signature                                               Date                 Time

## 2017-03-10 NOTE — OP REPORT
DATE OF SERVICE:  03/10/2017    PREOPERATIVE DIAGNOSES:  1.  Left hydronephrosis.  2.  Recurrent colorectal carcinoma.    POSTOPERATIVE DIAGNOSES:  1.  Left hydronephrosis.  2.  Recurrent colorectal carcinoma.    PROCEDURES  1.  Cystoscopy.  2.  Left retrograde pyelogram.  3.  Left ureteral stent placement.    SURGEON:  Johnathan Lubin MD    ASSISTANT:  None.    ANESTHESIOLOGIST:  Sascha Coe DO    TYPE OF ANESTHESIA:  General.    INDICATIONS:  This 56-year-old female with a history of colon cancer, has had   a pelvic recurrence, undergoing chemotherapy.  She has been noted to have   symptomatic painful left hydronephrosis and stent placement was requested.    FINDINGS:  The bladder appeared normal.  There was obstruction of the left   distal ureter below the level of the iliac vessels.  Stent was placed without   difficulty.    PROCEDURE:  The patient was identified in the holding area.  She was taken to   the operative suite.  General anesthesia was administered by Dr. Coe.    Cefazolin was given intravenously.  She was positioned in the dorsal lithotomy   position and sterilely prepped and draped, time-out was called.  Correct   patient and site of surgery was confirmed.    The patient was approached with a 25-Polish cystourethroscope.  This was   passed through the urethra into the bladder under direct vision with a   30-degree lens.  The lumen of the bladder showed no foreign bodies, tumors or   stones.  Ureteral orifices were orthotopic.    Left ureteral orifice was cannulated with a 6-Polish open-ended catheter.    Retrograde injection of contrast showed normal very distal ureter and in the   proximal portion of the distal third of the ureter, there was narrowing from   extrinsic compression.  Proximal to this, there was dilation with some   tortuosity proximally of the ureter.  Renal collecting system was dilated.    A sensor wire was advanced into the left kidney.  Over this, a 6-Polish x 22   cm  double-J stent was advanced and coiled proximally in the renal pelvis and   distally in the bladder.  The bladder was then emptied, 2% Xylocaine jelly was   placed into the patient's bladder and urethra.  Surgery was terminated.  The   patient was sent to recovery room in stable condition.  She suffered no   intraoperative complications.       ____________________________________     MD SERG STARR / RICHAR    DD:  03/10/2017 07:50:54  DT:  03/10/2017 09:15:25    D#:  [id number]  Job#:  [id number]    cc: MARIO GUEVARA MD

## 2017-03-10 NOTE — PROGRESS NOTES
Patient here for preadmit appointment.  Patient was added on yesterday afternoon and surgery tomorrow.  Preop orders included UA and urine culture.  I called Dr. Lubin's office and left a message with the surgery scheduler informing her that the patient did leave a urine sample today but that the urine culture would not be back before surgery.

## 2017-03-10 NOTE — IP AVS SNAPSHOT
3/10/2017          Clau Lerner  [address] [place] [address]    Dear Clau:    [facility] wants to ensure your discharge home is safe and you or your loved ones have had all your questions answered regarding your care after you leave the hospital.    You may receive a telephone call within two days of your discharge.  This call is to make certain you understand your discharge instructions as well as ensure we provided you with the best care possible during your stay with us.     The call will only last approximately 3-5 minutes and will be done by a nurse.    Once again, we want to ensure your discharge home is safe and that you have a clear understanding of any next steps in your care.  If you have any questions or concerns, please do not hesitate to contact us, we are here for you.  Thank you for choosing [facility] for your healthcare needs.    Sincerely,    Tino Berrios    [facility]

## 2017-03-10 NOTE — OR SURGEON
Immediate Post-Operative Note      PreOp Diagnosis: left hydronephrosis    PostOp Diagnosis: same    Procedure(s):  CYSTOSCOPY STENT PLACEMENT - Wound Class: Contaminated  RETROGRADES - Wound Class: Contaminated  PYELOGRAM - Wound Class: Contaminated    Surgeon(s):  Johnathan Lubin M.D.    Anesthesiologist/Type of Anesthesia:  Anesthesiologist: Sascha Coe D.O./General    Surgical Staff:  Circulator: Keiry Anne R.N.  Scrub Person: Beatriz Franks    Specimen: none    Estimated Blood Loss: none    Findings: distal ureteral obstruction    Complications: none        3/10/2017 7:47 AM Johnathan Lubin

## 2017-03-11 LAB
BACTERIA UR CULT: NORMAL
SIGNIFICANT IND 70042: NORMAL
SITE SITE: NORMAL
SOURCE SOURCE: NORMAL

## 2017-03-29 ENCOUNTER — HOSPITAL ENCOUNTER (OUTPATIENT)
Facility: MEDICAL CENTER | Age: 57
End: 2017-03-29
Attending: NURSE PRACTITIONER
Payer: COMMERCIAL

## 2017-03-29 LAB
ALBUMIN SERPL BCP-MCNC: 3.8 G/DL (ref 3.2–4.9)
ALBUMIN/GLOB SERPL: 1.7 G/DL
ALP SERPL-CCNC: 85 U/L (ref 30–99)
ALT SERPL-CCNC: 21 U/L (ref 2–50)
ANION GAP SERPL CALC-SCNC: 12 MMOL/L (ref 0–11.9)
AST SERPL-CCNC: 26 U/L (ref 12–45)
BILIRUB SERPL-MCNC: 0.5 MG/DL (ref 0.1–1.5)
BUN SERPL-MCNC: 7 MG/DL (ref 8–22)
CALCIUM SERPL-MCNC: 9.3 MG/DL (ref 8.5–10.5)
CHLORIDE SERPL-SCNC: 107 MMOL/L (ref 96–112)
CO2 SERPL-SCNC: 26 MMOL/L (ref 20–33)
CREAT SERPL-MCNC: 0.77 MG/DL (ref 0.5–1.4)
FERRITIN SERPL-MCNC: 53.9 NG/ML (ref 10–291)
FOLATE SERPL-MCNC: 18.6 NG/ML
GLOBULIN SER CALC-MCNC: 2.3 G/DL (ref 1.9–3.5)
GLUCOSE SERPL-MCNC: 104 MG/DL (ref 65–99)
POTASSIUM SERPL-SCNC: 4.1 MMOL/L (ref 3.6–5.5)
PROT SERPL-MCNC: 6.1 G/DL (ref 6–8.2)
SODIUM SERPL-SCNC: 145 MMOL/L (ref 135–145)
VIT B12 SERPL-MCNC: 494 PG/ML (ref 211–911)

## 2017-03-29 PROCEDURE — 80053 COMPREHEN METABOLIC PANEL: CPT

## 2017-03-29 PROCEDURE — 82746 ASSAY OF FOLIC ACID SERUM: CPT

## 2017-03-29 PROCEDURE — 82728 ASSAY OF FERRITIN: CPT

## 2017-03-29 PROCEDURE — 82607 VITAMIN B-12: CPT

## 2017-04-04 ENCOUNTER — HOSPITAL ENCOUNTER (OUTPATIENT)
Facility: MEDICAL CENTER | Age: 57
End: 2017-04-04
Attending: INTERNAL MEDICINE
Payer: COMMERCIAL

## 2017-04-04 PROCEDURE — 82306 VITAMIN D 25 HYDROXY: CPT

## 2017-04-04 PROCEDURE — 84443 ASSAY THYROID STIM HORMONE: CPT

## 2017-04-04 PROCEDURE — 84439 ASSAY OF FREE THYROXINE: CPT

## 2017-04-04 PROCEDURE — 84481 FREE ASSAY (FT-3): CPT

## 2017-04-05 LAB
25(OH)D3 SERPL-MCNC: 30 NG/ML (ref 30–100)
T3FREE SERPL-MCNC: 3.34 PG/ML (ref 2.4–4.2)
T4 FREE SERPL-MCNC: 0.91 NG/DL (ref 0.53–1.43)
TSH SERPL DL<=0.005 MIU/L-ACNC: 1.03 UIU/ML (ref 0.3–3.7)

## 2017-04-12 ENCOUNTER — HOSPITAL ENCOUNTER (OUTPATIENT)
Facility: MEDICAL CENTER | Age: 57
End: 2017-04-12
Attending: INTERNAL MEDICINE
Payer: COMMERCIAL

## 2017-04-12 LAB
AMBIGUOUS DTTM AMBI4: NORMAL
CEA SERPL-MCNC: 18.2 NG/ML (ref 0–3)

## 2017-04-12 PROCEDURE — 82378 CARCINOEMBRYONIC ANTIGEN: CPT

## 2017-04-12 PROCEDURE — 80053 COMPREHEN METABOLIC PANEL: CPT

## 2017-04-13 LAB
ALBUMIN SERPL BCP-MCNC: 3.6 G/DL (ref 3.2–4.9)
ALBUMIN/GLOB SERPL: 1.5 G/DL
ALP SERPL-CCNC: 79 U/L (ref 30–99)
ALT SERPL-CCNC: 22 U/L (ref 2–50)
ANION GAP SERPL CALC-SCNC: 9 MMOL/L (ref 0–11.9)
AST SERPL-CCNC: 22 U/L (ref 12–45)
BILIRUB SERPL-MCNC: 0.5 MG/DL (ref 0.1–1.5)
BUN SERPL-MCNC: 9 MG/DL (ref 8–22)
CALCIUM SERPL-MCNC: 9.2 MG/DL (ref 8.5–10.5)
CHLORIDE SERPL-SCNC: 106 MMOL/L (ref 96–112)
CO2 SERPL-SCNC: 25 MMOL/L (ref 20–33)
CREAT SERPL-MCNC: 0.73 MG/DL (ref 0.5–1.4)
GFR SERPL CREATININE-BSD FRML MDRD: >60 ML/MIN/1.73 M 2
GLOBULIN SER CALC-MCNC: 2.4 G/DL (ref 1.9–3.5)
GLUCOSE SERPL-MCNC: 91 MG/DL (ref 65–99)
POTASSIUM SERPL-SCNC: 4.2 MMOL/L (ref 3.6–5.5)
PROT SERPL-MCNC: 6 G/DL (ref 6–8.2)
SODIUM SERPL-SCNC: 140 MMOL/L (ref 135–145)

## 2017-04-20 ENCOUNTER — HOSPITAL ENCOUNTER (OUTPATIENT)
Dept: RADIOLOGY | Facility: MEDICAL CENTER | Age: 57
End: 2017-04-20
Attending: INTERNAL MEDICINE
Payer: COMMERCIAL

## 2017-04-20 DIAGNOSIS — C18.0 MALIGNANT NEOPLASM OF CECUM (HCC): ICD-10-CM

## 2017-04-20 PROCEDURE — 71260 CT THORAX DX C+: CPT

## 2017-04-20 PROCEDURE — 700117 HCHG RX CONTRAST REV CODE 255: Performed by: INTERNAL MEDICINE

## 2017-04-20 RX ADMIN — IOHEXOL 100 ML: 350 INJECTION, SOLUTION INTRAVENOUS at 13:25

## 2017-04-26 ENCOUNTER — HOSPITAL ENCOUNTER (OUTPATIENT)
Facility: MEDICAL CENTER | Age: 57
End: 2017-04-26
Attending: NURSE PRACTITIONER
Payer: COMMERCIAL

## 2017-04-26 PROCEDURE — 80053 COMPREHEN METABOLIC PANEL: CPT

## 2017-04-26 PROCEDURE — 83615 LACTATE (LD) (LDH) ENZYME: CPT

## 2017-04-27 LAB
ALBUMIN SERPL BCP-MCNC: 3.9 G/DL (ref 3.2–4.9)
ALBUMIN/GLOB SERPL: 1.7 G/DL
ALP SERPL-CCNC: 81 U/L (ref 30–99)
ALT SERPL-CCNC: 16 U/L (ref 2–50)
ANION GAP SERPL CALC-SCNC: 11 MMOL/L (ref 0–11.9)
AST SERPL-CCNC: 21 U/L (ref 12–45)
BILIRUB SERPL-MCNC: 0.4 MG/DL (ref 0.1–1.5)
BUN SERPL-MCNC: 8 MG/DL (ref 8–22)
CALCIUM SERPL-MCNC: 9.2 MG/DL (ref 8.5–10.5)
CHLORIDE SERPL-SCNC: 107 MMOL/L (ref 96–112)
CO2 SERPL-SCNC: 23 MMOL/L (ref 20–33)
CREAT SERPL-MCNC: 0.65 MG/DL (ref 0.5–1.4)
GFR SERPL CREATININE-BSD FRML MDRD: >60 ML/MIN/1.73 M 2
GLOBULIN SER CALC-MCNC: 2.3 G/DL (ref 1.9–3.5)
GLUCOSE SERPL-MCNC: 116 MG/DL (ref 65–99)
LDH SERPL-CCNC: 227 U/L (ref 107–266)
POTASSIUM SERPL-SCNC: 3.4 MMOL/L (ref 3.6–5.5)
PROT SERPL-MCNC: 6.2 G/DL (ref 6–8.2)
SODIUM SERPL-SCNC: 141 MMOL/L (ref 135–145)

## 2017-05-10 ENCOUNTER — HOSPITAL ENCOUNTER (OUTPATIENT)
Facility: MEDICAL CENTER | Age: 57
End: 2017-05-10
Attending: INTERNAL MEDICINE
Payer: COMMERCIAL

## 2017-05-10 LAB
ALBUMIN SERPL BCP-MCNC: 3.9 G/DL (ref 3.2–4.9)
ALBUMIN/GLOB SERPL: 1.7 G/DL
ALP SERPL-CCNC: 76 U/L (ref 30–99)
ALT SERPL-CCNC: 17 U/L (ref 2–50)
ANION GAP SERPL CALC-SCNC: 12 MMOL/L (ref 0–11.9)
AST SERPL-CCNC: 22 U/L (ref 12–45)
BILIRUB SERPL-MCNC: 0.4 MG/DL (ref 0.1–1.5)
BUN SERPL-MCNC: 8 MG/DL (ref 8–22)
CALCIUM SERPL-MCNC: 9.6 MG/DL (ref 8.5–10.5)
CEA SERPL-MCNC: 14.5 NG/ML (ref 0–3)
CHLORIDE SERPL-SCNC: 107 MMOL/L (ref 96–112)
CO2 SERPL-SCNC: 22 MMOL/L (ref 20–33)
CREAT SERPL-MCNC: 0.62 MG/DL (ref 0.5–1.4)
GFR SERPL CREATININE-BSD FRML MDRD: >60 ML/MIN/1.73 M 2
GLOBULIN SER CALC-MCNC: 2.3 G/DL (ref 1.9–3.5)
GLUCOSE SERPL-MCNC: 89 MG/DL (ref 65–99)
POTASSIUM SERPL-SCNC: 4.2 MMOL/L (ref 3.6–5.5)
PROT SERPL-MCNC: 6.2 G/DL (ref 6–8.2)
SODIUM SERPL-SCNC: 141 MMOL/L (ref 135–145)

## 2017-05-10 PROCEDURE — 80053 COMPREHEN METABOLIC PANEL: CPT

## 2017-05-10 PROCEDURE — 82378 CARCINOEMBRYONIC ANTIGEN: CPT

## 2017-05-24 ENCOUNTER — HOSPITAL ENCOUNTER (OUTPATIENT)
Facility: MEDICAL CENTER | Age: 57
End: 2017-05-24
Attending: INTERNAL MEDICINE
Payer: COMMERCIAL

## 2017-05-24 ENCOUNTER — HOSPITAL ENCOUNTER (OUTPATIENT)
Dept: RADIOLOGY | Facility: MEDICAL CENTER | Age: 57
End: 2017-05-24
Attending: INTERNAL MEDICINE
Payer: COMMERCIAL

## 2017-05-24 DIAGNOSIS — C18.0 MALIGNANT NEOPLASM OF CECUM (HCC): ICD-10-CM

## 2017-05-24 LAB
ALBUMIN SERPL BCP-MCNC: 3.9 G/DL (ref 3.2–4.9)
ALBUMIN/GLOB SERPL: 1.8 G/DL
ALP SERPL-CCNC: 70 U/L (ref 30–99)
ALT SERPL-CCNC: 15 U/L (ref 2–50)
ANION GAP SERPL CALC-SCNC: 9 MMOL/L (ref 0–11.9)
AST SERPL-CCNC: 17 U/L (ref 12–45)
BILIRUB SERPL-MCNC: 0.5 MG/DL (ref 0.1–1.5)
BUN SERPL-MCNC: 10 MG/DL (ref 8–22)
CALCIUM SERPL-MCNC: 9 MG/DL (ref 8.5–10.5)
CHLORIDE SERPL-SCNC: 109 MMOL/L (ref 96–112)
CO2 SERPL-SCNC: 24 MMOL/L (ref 20–33)
CREAT SERPL-MCNC: 0.72 MG/DL (ref 0.5–1.4)
GFR SERPL CREATININE-BSD FRML MDRD: >60 ML/MIN/1.73 M 2
GLOBULIN SER CALC-MCNC: 2.2 G/DL (ref 1.9–3.5)
GLUCOSE SERPL-MCNC: 114 MG/DL (ref 65–99)
POTASSIUM SERPL-SCNC: 3.6 MMOL/L (ref 3.6–5.5)
PROT SERPL-MCNC: 6.1 G/DL (ref 6–8.2)
SODIUM SERPL-SCNC: 142 MMOL/L (ref 135–145)

## 2017-05-24 PROCEDURE — 700111 HCHG RX REV CODE 636 W/ 250 OVERRIDE (IP)

## 2017-05-24 PROCEDURE — 700117 HCHG RX CONTRAST REV CODE 255: Performed by: RADIOLOGY

## 2017-05-24 PROCEDURE — 36598 INJ W/FLUOR EVAL CV DEVICE: CPT

## 2017-05-24 PROCEDURE — 80053 COMPREHEN METABOLIC PANEL: CPT

## 2017-05-24 RX ADMIN — HEPARIN: 100 SYRINGE at 15:57

## 2017-05-24 RX ADMIN — IOHEXOL 3 ML: 300 INJECTION, SOLUTION INTRAVENOUS at 15:56

## 2017-05-24 ASSESSMENT — PAIN SCALES - GENERAL: PAINLEVEL_OUTOF10: 0

## 2017-05-24 NOTE — PROGRESS NOTES
IR RN note:    Port check by MD Naylor assisted by RT Howard,Right chest access site at port;  Contrast injected into port    Patient tolerated procedure, patient walked back out to [facility] with family in stable condition

## 2017-06-07 ENCOUNTER — HOSPITAL ENCOUNTER (OUTPATIENT)
Facility: MEDICAL CENTER | Age: 57
End: 2017-06-07
Attending: INTERNAL MEDICINE
Payer: COMMERCIAL

## 2017-06-07 LAB
ALBUMIN SERPL BCP-MCNC: 3.9 G/DL (ref 3.2–4.9)
ALBUMIN/GLOB SERPL: 1.8 G/DL
ALP SERPL-CCNC: 73 U/L (ref 30–99)
ALT SERPL-CCNC: 17 U/L (ref 2–50)
AMBIGUOUS DTTM AMBI4: NORMAL
ANION GAP SERPL CALC-SCNC: 7 MMOL/L (ref 0–11.9)
AST SERPL-CCNC: 18 U/L (ref 12–45)
BILIRUB SERPL-MCNC: 0.3 MG/DL (ref 0.1–1.5)
BUN SERPL-MCNC: 11 MG/DL (ref 8–22)
CALCIUM SERPL-MCNC: 9.1 MG/DL (ref 8.5–10.5)
CEA SERPL-MCNC: 12.8 NG/ML (ref 0–3)
CHLORIDE SERPL-SCNC: 108 MMOL/L (ref 96–112)
CO2 SERPL-SCNC: 26 MMOL/L (ref 20–33)
CREAT SERPL-MCNC: 0.62 MG/DL (ref 0.5–1.4)
GFR SERPL CREATININE-BSD FRML MDRD: >60 ML/MIN/1.73 M 2
GLOBULIN SER CALC-MCNC: 2.2 G/DL (ref 1.9–3.5)
GLUCOSE SERPL-MCNC: 86 MG/DL (ref 65–99)
POTASSIUM SERPL-SCNC: 4 MMOL/L (ref 3.6–5.5)
PROT SERPL-MCNC: 6.1 G/DL (ref 6–8.2)
SODIUM SERPL-SCNC: 141 MMOL/L (ref 135–145)

## 2017-06-07 PROCEDURE — 82378 CARCINOEMBRYONIC ANTIGEN: CPT

## 2017-06-07 PROCEDURE — 80053 COMPREHEN METABOLIC PANEL: CPT

## 2017-06-09 ENCOUNTER — HOSPITAL ENCOUNTER (OUTPATIENT)
Dept: RADIOLOGY | Facility: MEDICAL CENTER | Age: 57
End: 2017-06-09
Attending: INTERNAL MEDICINE
Payer: COMMERCIAL

## 2017-06-09 DIAGNOSIS — C18.0 MALIGNANT NEOPLASM OF CECUM (HCC): ICD-10-CM

## 2017-06-09 PROCEDURE — 71260 CT THORAX DX C+: CPT

## 2017-06-09 PROCEDURE — 700117 HCHG RX CONTRAST REV CODE 255: Performed by: INTERNAL MEDICINE

## 2017-06-09 RX ADMIN — IOHEXOL 100 ML: 350 INJECTION, SOLUTION INTRAVENOUS at 10:52

## 2017-06-12 ENCOUNTER — HOSPITAL ENCOUNTER (OUTPATIENT)
Facility: MEDICAL CENTER | Age: 57
End: 2017-06-12
Attending: UROLOGY | Admitting: UROLOGY
Payer: COMMERCIAL

## 2017-06-21 ENCOUNTER — HOSPITAL ENCOUNTER (OUTPATIENT)
Facility: MEDICAL CENTER | Age: 57
End: 2017-06-21
Attending: INTERNAL MEDICINE
Payer: COMMERCIAL

## 2017-06-21 LAB
ALBUMIN SERPL BCP-MCNC: 3.8 G/DL (ref 3.2–4.9)
ALBUMIN/GLOB SERPL: 1.7 G/DL
ALP SERPL-CCNC: 77 U/L (ref 30–99)
ALT SERPL-CCNC: 14 U/L (ref 2–50)
ANION GAP SERPL CALC-SCNC: 7 MMOL/L (ref 0–11.9)
AST SERPL-CCNC: 17 U/L (ref 12–45)
BILIRUB SERPL-MCNC: 0.4 MG/DL (ref 0.1–1.5)
BUN SERPL-MCNC: 12 MG/DL (ref 8–22)
CALCIUM SERPL-MCNC: 9.3 MG/DL (ref 8.5–10.5)
CHLORIDE SERPL-SCNC: 108 MMOL/L (ref 96–112)
CO2 SERPL-SCNC: 27 MMOL/L (ref 20–33)
CREAT SERPL-MCNC: 0.71 MG/DL (ref 0.5–1.4)
GFR SERPL CREATININE-BSD FRML MDRD: >60 ML/MIN/1.73 M 2
GLOBULIN SER CALC-MCNC: 2.2 G/DL (ref 1.9–3.5)
GLUCOSE SERPL-MCNC: 114 MG/DL (ref 65–99)
POTASSIUM SERPL-SCNC: 4 MMOL/L (ref 3.6–5.5)
PROT SERPL-MCNC: 6 G/DL (ref 6–8.2)
SODIUM SERPL-SCNC: 142 MMOL/L (ref 135–145)

## 2017-06-21 PROCEDURE — 80053 COMPREHEN METABOLIC PANEL: CPT

## 2017-06-28 ENCOUNTER — APPOINTMENT (OUTPATIENT)
Dept: ADMISSIONS | Facility: MEDICAL CENTER | Age: 57
End: 2017-06-28
Payer: COMMERCIAL

## 2017-07-21 ENCOUNTER — OFFICE VISIT (OUTPATIENT)
Dept: URGENT CARE | Facility: PHYSICIAN GROUP | Age: 57
End: 2017-07-21
Payer: COMMERCIAL

## 2017-07-21 VITALS
OXYGEN SATURATION: 93 % | BODY MASS INDEX: 18.95 KG/M2 | HEIGHT: 62 IN | TEMPERATURE: 99 F | SYSTOLIC BLOOD PRESSURE: 122 MMHG | WEIGHT: 103 LBS | DIASTOLIC BLOOD PRESSURE: 70 MMHG | HEART RATE: 98 BPM

## 2017-07-21 DIAGNOSIS — Z51.89 VISIT FOR WOUND CHECK: ICD-10-CM

## 2017-07-21 PROCEDURE — 99212 OFFICE O/P EST SF 10 MIN: CPT | Performed by: FAMILY MEDICINE

## 2017-07-21 RX ORDER — GABAPENTIN 100 MG/1
100 CAPSULE ORAL 3 TIMES DAILY
Status: ON HOLD | COMMUNITY
End: 2017-08-24

## 2017-07-21 RX ORDER — ACETAMINOPHEN 325 MG/1
650 TABLET ORAL EVERY 4 HOURS PRN
Status: ON HOLD | COMMUNITY
End: 2017-08-24

## 2017-07-21 NOTE — PROGRESS NOTES
"Subjective:      Clau Lerner is a 57 y.o. female who presents with Suture / Staple Removal            Suture / Staple Removal        ROS       Objective:     /70 mmHg  Pulse 98  Temp(Src) 37.2 °C (99 °F)  Ht 1.562 m (5' 1.5\")  Wt 46.72 kg (103 lb)  BMI 19.15 kg/m2  SpO2 93%  LMP  (LMP Unknown)     Physical Exam            Assessment/Plan:     There are no diagnoses linked to this encounter.      "

## 2017-07-24 NOTE — PROGRESS NOTES
Here for wound check, s/p abdominal surgery at [facility]      O:    abd incision is:  C/D/I with staples intact.   No redness      P:  Staples removed       Percocet      Last Written Prescription Date:  1.6.17  Last Fill Quantity: 60,   # refills: 0  Last Office Visit with [facility], [facility] or [facility] prescribing provider: 1.6.17  Future Office visit:    Next 5 appointments (look out 90 days)     Feb 23, 2017  1:45 PM CST   (Arrive by 1:30 PM)   Pre-Op physical with Gibran Alarcon MD   [facility]    [address]  [facility] [address]   [phone]            Feb 24, 2017  1:00 PM CST   (Arrive by 12:45 PM)   Return Visit with Moisés Olson MD    ORTHOPEDICS ([facility])    [address] [facility] [id number] [phone]                   Routing refill request to provider for review/approval because:  Drug not on the [facility], [facility] or [facility] refill protocol or controlled substance

## 2017-08-08 ENCOUNTER — HOSPITAL ENCOUNTER (OUTPATIENT)
Facility: MEDICAL CENTER | Age: 57
End: 2017-08-08
Attending: INTERNAL MEDICINE
Payer: COMMERCIAL

## 2017-08-08 PROCEDURE — 82378 CARCINOEMBRYONIC ANTIGEN: CPT

## 2017-08-08 PROCEDURE — 80053 COMPREHEN METABOLIC PANEL: CPT

## 2017-08-09 LAB
ALBUMIN SERPL BCP-MCNC: 3.9 G/DL (ref 3.2–4.9)
ALBUMIN/GLOB SERPL: 1.6 G/DL
ALP SERPL-CCNC: 67 U/L (ref 30–99)
ALT SERPL-CCNC: 16 U/L (ref 2–50)
ANION GAP SERPL CALC-SCNC: 8 MMOL/L (ref 0–11.9)
AST SERPL-CCNC: 16 U/L (ref 12–45)
BILIRUB SERPL-MCNC: 0.3 MG/DL (ref 0.1–1.5)
BUN SERPL-MCNC: 13 MG/DL (ref 8–22)
CALCIUM SERPL-MCNC: 9.1 MG/DL (ref 8.5–10.5)
CEA SERPL-MCNC: 5.5 NG/ML (ref 0–3)
CHLORIDE SERPL-SCNC: 105 MMOL/L (ref 96–112)
CO2 SERPL-SCNC: 26 MMOL/L (ref 20–33)
CREAT SERPL-MCNC: 0.64 MG/DL (ref 0.5–1.4)
GFR SERPL CREATININE-BSD FRML MDRD: >60 ML/MIN/1.73 M 2
GLOBULIN SER CALC-MCNC: 2.4 G/DL (ref 1.9–3.5)
GLUCOSE SERPL-MCNC: 90 MG/DL (ref 65–99)
POTASSIUM SERPL-SCNC: 4.3 MMOL/L (ref 3.6–5.5)
PROT SERPL-MCNC: 6.3 G/DL (ref 6–8.2)
SODIUM SERPL-SCNC: 139 MMOL/L (ref 135–145)

## 2017-08-14 ENCOUNTER — HOSPITAL ENCOUNTER (OUTPATIENT)
Dept: LAB | Facility: MEDICAL CENTER | Age: 57
End: 2017-08-14
Attending: OBSTETRICS & GYNECOLOGY
Payer: COMMERCIAL

## 2017-08-14 PROCEDURE — 87624 HPV HI-RISK TYP POOLED RSLT: CPT

## 2017-08-14 PROCEDURE — 88175 CYTOPATH C/V AUTO FLUID REDO: CPT

## 2017-08-14 PROCEDURE — 87491 CHLMYD TRACH DNA AMP PROBE: CPT

## 2017-08-14 PROCEDURE — 87591 N.GONORRHOEAE DNA AMP PROB: CPT

## 2017-08-16 LAB
C TRACH DNA GENITAL QL NAA+PROBE: NEGATIVE
CYTOLOGY REG CYTOL: NORMAL
HPV HR 12 DNA CVX QL NAA+PROBE: NEGATIVE
HPV16 DNA SPEC QL NAA+PROBE: NEGATIVE
HPV18 DNA SPEC QL NAA+PROBE: NEGATIVE
N GONORRHOEA DNA GENITAL QL NAA+PROBE: NEGATIVE
SPECIMEN SOURCE: NORMAL
SPECIMEN SOURCE: NORMAL

## 2017-08-24 ENCOUNTER — HOSPITAL ENCOUNTER (OUTPATIENT)
Facility: MEDICAL CENTER | Age: 57
End: 2017-08-24
Attending: UROLOGY | Admitting: UROLOGY
Payer: COMMERCIAL

## 2017-08-24 ENCOUNTER — APPOINTMENT (OUTPATIENT)
Dept: RADIOLOGY | Facility: MEDICAL CENTER | Age: 57
End: 2017-08-24
Attending: UROLOGY
Payer: COMMERCIAL

## 2017-08-24 VITALS
OXYGEN SATURATION: 98 % | RESPIRATION RATE: 16 BRPM | HEART RATE: 73 BPM | HEIGHT: 62 IN | TEMPERATURE: 97.4 F | BODY MASS INDEX: 18.01 KG/M2 | WEIGHT: 97.88 LBS

## 2017-08-24 PROCEDURE — 160009 HCHG ANES TIME/MIN: Performed by: UROLOGY

## 2017-08-24 PROCEDURE — 160028 HCHG SURGERY MINUTES - 1ST 30 MINS LEVEL 3: Performed by: UROLOGY

## 2017-08-24 PROCEDURE — 160048 HCHG OR STATISTICAL LEVEL 1-5: Performed by: UROLOGY

## 2017-08-24 PROCEDURE — 700111 HCHG RX REV CODE 636 W/ 250 OVERRIDE (IP)

## 2017-08-24 PROCEDURE — 501329 HCHG SET, CYSTO IRRIG Y TUR: Performed by: UROLOGY

## 2017-08-24 PROCEDURE — 160046 HCHG PACU - 1ST 60 MINS PHASE II: Performed by: UROLOGY

## 2017-08-24 PROCEDURE — 700101 HCHG RX REV CODE 250

## 2017-08-24 PROCEDURE — 160002 HCHG RECOVERY MINUTES (STAT): Performed by: UROLOGY

## 2017-08-24 PROCEDURE — 160035 HCHG PACU - 1ST 60 MINS PHASE I: Performed by: UROLOGY

## 2017-08-24 PROCEDURE — 160025 RECOVERY II MINUTES (STATS): Performed by: UROLOGY

## 2017-08-24 PROCEDURE — C1769 GUIDE WIRE: HCPCS | Performed by: UROLOGY

## 2017-08-24 PROCEDURE — A9270 NON-COVERED ITEM OR SERVICE: HCPCS

## 2017-08-24 PROCEDURE — 700102 HCHG RX REV CODE 250 W/ 637 OVERRIDE(OP)

## 2017-08-24 PROCEDURE — 500879 HCHG PACK, CYSTO: Performed by: UROLOGY

## 2017-08-24 PROCEDURE — A4357 BEDSIDE DRAINAGE BAG: HCPCS | Performed by: UROLOGY

## 2017-08-24 PROCEDURE — 160036 HCHG PACU - EA ADDL 30 MINS PHASE I: Performed by: UROLOGY

## 2017-08-24 RX ORDER — SODIUM CHLORIDE, SODIUM LACTATE, POTASSIUM CHLORIDE, CALCIUM CHLORIDE 600; 310; 30; 20 MG/100ML; MG/100ML; MG/100ML; MG/100ML
INJECTION, SOLUTION INTRAVENOUS CONTINUOUS
Status: DISCONTINUED | OUTPATIENT
Start: 2017-08-24 | End: 2017-08-24 | Stop reason: HOSPADM

## 2017-08-24 RX ORDER — KETOROLAC TROMETHAMINE 30 MG/ML
INJECTION, SOLUTION INTRAMUSCULAR; INTRAVENOUS
Status: COMPLETED
Start: 2017-08-24 | End: 2017-08-24

## 2017-08-24 RX ORDER — LIDOCAINE AND PRILOCAINE 25; 25 MG/G; MG/G
1 CREAM TOPICAL
Status: COMPLETED | OUTPATIENT
Start: 2017-08-24 | End: 2017-08-24

## 2017-08-24 RX ORDER — MIDAZOLAM HYDROCHLORIDE 1 MG/ML
INJECTION INTRAMUSCULAR; INTRAVENOUS
Status: DISCONTINUED
Start: 2017-08-24 | End: 2017-08-24 | Stop reason: HOSPADM

## 2017-08-24 RX ORDER — LIDOCAINE HYDROCHLORIDE 10 MG/ML
0.5 INJECTION, SOLUTION INFILTRATION; PERINEURAL
Status: COMPLETED | OUTPATIENT
Start: 2017-08-24 | End: 2017-08-24

## 2017-08-24 RX ORDER — LIDOCAINE HYDROCHLORIDE 10 MG/ML
INJECTION, SOLUTION INFILTRATION; PERINEURAL
Status: COMPLETED
Start: 2017-08-24 | End: 2017-08-24

## 2017-08-24 RX ORDER — MORPHINE SULFATE 15 MG/1
30 TABLET, FILM COATED, EXTENDED RELEASE ORAL EVERY 12 HOURS
Status: ON HOLD | COMMUNITY
End: 2018-01-10

## 2017-08-24 RX ORDER — OXYCODONE HCL 5 MG/5 ML
SOLUTION, ORAL ORAL
Status: COMPLETED
Start: 2017-08-24 | End: 2017-08-24

## 2017-08-24 RX ADMIN — LIDOCAINE HYDROCHLORIDE 0.5 ML: 10 INJECTION, SOLUTION INFILTRATION; PERINEURAL at 13:50

## 2017-08-24 RX ADMIN — FENTANYL CITRATE 50 MCG: 50 INJECTION, SOLUTION INTRAMUSCULAR; INTRAVENOUS at 15:05

## 2017-08-24 RX ADMIN — KETOROLAC TROMETHAMINE 30 MG: 30 INJECTION, SOLUTION INTRAMUSCULAR at 15:00

## 2017-08-24 RX ADMIN — OXYCODONE HYDROCHLORIDE 10 MG: 5 SOLUTION ORAL at 14:55

## 2017-08-24 RX ADMIN — FENTANYL CITRATE 50 MCG: 50 INJECTION, SOLUTION INTRAMUSCULAR; INTRAVENOUS at 14:53

## 2017-08-24 RX ADMIN — SODIUM CHLORIDE, SODIUM LACTATE, POTASSIUM CHLORIDE, CALCIUM CHLORIDE: 600; 310; 30; 20 INJECTION, SOLUTION INTRAVENOUS at 13:50

## 2017-08-24 ASSESSMENT — PAIN SCALES - GENERAL
PAINLEVEL_OUTOF10: 6
PAINLEVEL_OUTOF10: 3
PAINLEVEL_OUTOF10: 5
PAINLEVEL_OUTOF10: 5
PAINLEVEL_OUTOF10: 4
PAINLEVEL_OUTOF10: 0
PAINLEVEL_OUTOF10: 0

## 2017-08-24 NOTE — OP REPORT
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 Gen. anesthesia was administered by Dr. michelle. She was positioned in the dorsal lithotomy position and sterilely prepped and draped. Timeout was called. Correct patient and site of surgery was confirmed.    A 21 Turkmen cystourethroscope was introduced under direct vision. The bladder was inspected. No abnormal findings other than the stent were identified.    The left ureteral stent was grasped and easily withdrawn. The bladder was emptied. The patient was sent to recovery room in stable condition    EBL: None    UOP: None    Drains: None    Dispo: Stable to PACU

## 2017-08-24 NOTE — DISCHARGE INSTRUCTIONS
ACTIVITY: Rest and take it easy for the first 24 hours.  A responsible adult is recommended to remain with you during that time.  It is normal to feel sleepy.  We encourage you to not do anything that requires balance, judgment or coordination.    MILD FLU-LIKE SYMPTOMS ARE NORMAL. YOU MAY EXPERIENCE GENERALIZED MUSCLE ACHES, THROAT IRRITATION, HEADACHE AND/OR SOME NAUSEA.    FOR 24 HOURS DO NOT:  Drive, operate machinery or run household appliances.  Drink beer or alcoholic beverages.   Make important decisions or sign legal documents.    SPECIAL INSTRUCTIONS: call office to discuss CT results    DIET: To avoid nausea, slowly advance diet as tolerated, avoiding spicy or greasy foods for the first day.  Add more substantial food to your diet according to your physician's instructions.  Babies can be fed formula or breast milk as soon as they are hungry.  INCREASE FLUIDS AND FIBER TO AVOID CONSTIPATION.    SURGICAL DRESSING/BATHING: NA    FOLLOW-UP APPOINTMENT:  A follow-up appointment should be arranged with your doctor in 1-2 weeks; call to schedule.    You should CALL YOUR PHYSICIAN if you develop:  Fever greater than 101 degrees F.  Pain not relieved by medication, or persistent nausea or vomiting.  Excessive bleeding (blood soaking through dressing) or unexpected drainage from the wound.  Extreme redness or swelling around the incision site, drainage of pus or foul smelling drainage.  Inability to urinate or empty your bladder within 8 hours.  Problems with breathing or chest pain.    You should call 911 if you develop problems with breathing or chest pain.  If you are unable to contact your doctor or surgical center, you should go to the nearest emergency room or urgent care center.  Physician's telephone #: [phone]    If any questions arise, call your doctor.  If your doctor is not available, please feel free to call the Surgical Center at [phone].  The Center is open Monday through Friday from 7AM to  7PM.  You can also call the HEALTH HOTLINE open 24 hours/day, 7 days/week and speak to a nurse at [phone], or toll free at [phone].    A registered nurse may call you a few days after your surgery to see how you are doing after your procedure.    MEDICATIONS: Resume taking daily medication.  Take prescribed pain medication with food.  If no medication is prescribed, you may take non-aspirin pain medication if needed.  PAIN MEDICATION CAN BE VERY CONSTIPATING.  Take a stool softener or laxative such as senokot, pericolace, or milk of magnesia if needed.    May take pain medication anytime.    If your physician has prescribed pain medication that includes Acetaminophen (Tylenol), do not take additional Acetaminophen (Tylenol) while taking the prescribed medication.    Depression / Suicide Risk    As you are discharged from this [facility] facility, it is important to learn how to keep safe from harming yourself.    Recognize the warning signs:  · Abrupt changes in personality, positive or negative- including increase in energy   · Giving away possessions  · Change in eating patterns- significant weight changes-  positive or negative  · Change in sleeping patterns- unable to sleep or sleeping all the time   · Unwillingness or inability to communicate  · Depression  · Unusual sadness, discouragement and loneliness  · Talk of wanting to die  · Neglect of personal appearance   · Rebelliousness- reckless behavior  · Withdrawal from people/activities they love  · Confusion- inability to concentrate     If you or a loved one observes any of these behaviors or has concerns about self-harm, here's what you can do:  · Talk about it- your feelings and reasons for harming yourself  · Remove any means that you might use to hurt yourself (examples: pills, rope, extension cords, firearm)  · Get professional help from the community (Mental Health, Substance Abuse, psychological counseling)  · Do not be alone:Call  your Safe Contact- someone whom you trust who will be there for you.  · Call your local CRISIS HOTLINE 551-3639 or 477-391-9909  · Call your local Children's Mobile Crisis Response Team [place] (581) 914-7598 or www.[employer]  · Call the toll free National Suicide Prevention Hotlines   · National Suicide Prevention Lifeline 936-787-BNJS (0534)  · National NSL [employer] Line Network 800-SUICIDE (230-2233)

## 2017-08-24 NOTE — IP AVS SNAPSHOT
" Home Care Instructions                                                                                                                Name:Clau Lerner  Medical Record Number:[id number]  CSN: [id number]    YOB: 1960   Age: 57 y.o.  Sex: female  HT:1.575 m (5' 2\") WT: 44.4 kg (97 lb 14.2 oz)          Admit Date: 8/24/2017     Discharge Date:   Today's Date: 8/24/2017  Attending Doctor:  Johnathan Lubin M.D.                  Allergies:  Sulfa drugs                Discharge Instructions         ACTIVITY: Rest and take it easy for the first 24 hours.  A responsible adult is recommended to remain with you during that time.  It is normal to feel sleepy.  We encourage you to not do anything that requires balance, judgment or coordination.    MILD FLU-LIKE SYMPTOMS ARE NORMAL. YOU MAY EXPERIENCE GENERALIZED MUSCLE ACHES, THROAT IRRITATION, HEADACHE AND/OR SOME NAUSEA.    FOR 24 HOURS DO NOT:  Drive, operate machinery or run household appliances.  Drink beer or alcoholic beverages.   Make important decisions or sign legal documents.    SPECIAL INSTRUCTIONS: call office to discuss CT results    DIET: To avoid nausea, slowly advance diet as tolerated, avoiding spicy or greasy foods for the first day.  Add more substantial food to your diet according to your physician's instructions.  Babies can be fed formula or breast milk as soon as they are hungry.  INCREASE FLUIDS AND FIBER TO AVOID CONSTIPATION.    SURGICAL DRESSING/BATHING: NA    FOLLOW-UP APPOINTMENT:  A follow-up appointment should be arranged with your doctor in 1-2 weeks; call to schedule.    You should CALL YOUR PHYSICIAN if you develop:  Fever greater than 101 degrees F.  Pain not relieved by medication, or persistent nausea or vomiting.  Excessive bleeding (blood soaking through dressing) or unexpected drainage from the wound.  Extreme redness or swelling around the incision site, drainage of pus or foul smelling drainage.  Inability to urinate or " empty your bladder within 8 hours.  Problems with breathing or chest pain.    You should call 911 if you develop problems with breathing or chest pain.  If you are unable to contact your doctor or surgical center, you should go to the nearest emergency room or urgent care center.  Physician's telephone #: [phone]    If any questions arise, call your doctor.  If your doctor is not available, please feel free to call the Surgical Center at [phone].  The Center is open Monday through Friday from 7AM to 7PM.  You can also call the HEALTH HOTLINE open 24 hours/day, 7 days/week and speak to a nurse at [phone], or toll free at [phone].    A registered nurse may call you a few days after your surgery to see how you are doing after your procedure.    MEDICATIONS: Resume taking daily medication.  Take prescribed pain medication with food.  If no medication is prescribed, you may take non-aspirin pain medication if needed.  PAIN MEDICATION CAN BE VERY CONSTIPATING.  Take a stool softener or laxative such as senokot, pericolace, or milk of magnesia if needed.    May take pain medication anytime.    If your physician has prescribed pain medication that includes Acetaminophen (Tylenol), do not take additional Acetaminophen (Tylenol) while taking the prescribed medication.    Depression / Suicide Risk    As you are discharged from this [facility] facility, it is important to learn how to keep safe from harming yourself.    Recognize the warning signs:  · Abrupt changes in personality, positive or negative- including increase in energy   · Giving away possessions  · Change in eating patterns- significant weight changes-  positive or negative  · Change in sleeping patterns- unable to sleep or sleeping all the time   · Unwillingness or inability to communicate  · Depression  · Unusual sadness, discouragement and loneliness  · Talk of wanting to die  · Neglect of personal appearance   · Rebelliousness- reckless  behavior  · Withdrawal from people/activities they love  · Confusion- inability to concentrate     If you or a loved one observes any of these behaviors or has concerns about self-harm, here's what you can do:  · Talk about it- your feelings and reasons for harming yourself  · Remove any means that you might use to hurt yourself (examples: pills, rope, extension cords, firearm)  · Get professional help from the community (Mental Health, Substance Abuse, psychological counseling)  · Do not be alone:Call your Safe Contact- someone whom you trust who will be there for you.  · Call your local CRISIS HOTLINE 734-7635 or 535-973-3109  · Call your local Children's Mobile Crisis Response Team [place] (113) 335-1034 or www.Ne[employer]Nation.de  · Call the toll free National Suicide Prevention Hotlines   · National Suicide Prevention Lifeline 706-518-CNUG (7515)  · [employer] Line Network 800-SUICIDE (092-5618)       Medication List      CONTINUE taking these medications        Instructions    Morning Afternoon Evening Bedtime    morphine ER 15 MG Tbcr tablet   Commonly known as:  MS CONTIN        Take 30 mg by mouth every 12 hours.   Dose:  30 mg                        oxycodone immediate release 10 MG immediate release tablet   Commonly known as:  ROXICODONE        Take 10 mg by mouth every 6 hours as needed.   Dose:  10 mg                                Medication Information     Above and/or attached are the medications your physician expects you to take upon discharge. Review all of your home medications and newly ordered medications with your doctor and/or pharmacist. Follow medication instructions as directed by your doctor and/or pharmacist. Please keep your medication list with you and share with your physician. Update the information when medications are discontinued, doses are changed, or new medications (including over-the-counter products) are added; and carry medication information at all times in the event  of emergency situations.        Resources     Quit Smoking / Tobacco Use:    I understand the use of any tobacco products increases my chance of suffering from future heart disease or stroke and could cause other illnesses which may shorten my life. Quitting the use of tobacco products is the single most important thing I can do to improve my health. For further information on smoking / tobacco cessation call a Toll Free Quit Line at 1-329.157.2605 (*National Cancer [place]) or 1-151.954.8933 (American Lung Association) or you can access the web based program at www.lungusa.org.    [place] Tobacco Users Help Line:  (984) 110-7942       Toll Free: 1-565.446.3131  Quit Tobacco Program [facility] (180)215-7271    Crisis Hotline:    [place] Crisis Hotline:  1-300-NNKCGOM or 1-566.149.7779    [place] Crisis Hotline:    1-546.642.8590 or 222-230-9172    Discharge Survey:   Thank you for choosing [facility]. We hope we did everything we could to make your hospital stay a pleasant one. You may be receiving a survey and we would appreciate your time and participation in answering the questions. Your input is very valuable to us in our efforts to improve our service to our patients and their families.            Signatures     My signature on this form indicates that:    1. I acknowledge receipt and understanding of these Home Care Instruction.  2. My questions regarding this information have been answered to my satisfaction.  3. I have formulated a plan with my discharge nurse to obtain my prescribed medications for home.    __________________________________      __________________________________                   Patient Signature                                 Guardian/Responsible Adult Signature      __________________________________                 __________       ________                       Nurse Signature                                               Date                 Time

## 2017-08-24 NOTE — PROGRESS NOTES
The Medication Reconciliation process has been completed by interviewing the patient    Allergies have been reviewed  Antibiotic use in 30 days - none    [facility]:  [facility]

## 2017-08-24 NOTE — PROGRESS NOTES
Pt denies pain/nausea, vss, pt states she would like to go home,  at bedside, vss, safe to dc home

## 2017-08-24 NOTE — IP AVS SNAPSHOT
8/24/2017    Clau Mary Ellenesteban Lerner  [address] [address]    Dear Clau:    [facility] wants to ensure your discharge home is safe and you or your loved ones have had all of your questions answered regarding your care after you leave the hospital.    Below is a list of resources and contact information should you have any questions regarding your hospital stay, follow-up instructions, or active medical symptoms.    Questions or Concerns Regarding… Contact   Medical Questions Related to Your Discharge  (7 days a week, 8am-5pm) Contact a Nurse Care Coordinator   [phone]   Medical Questions Not Related to Your Discharge  (24 hours a day / 7 days a week)  Contact the Nurse Health Line   [phone]    Medications or Discharge Instructions Refer to your discharge packet   or contact your [facility] Primary Care Provider   [phone]   Follow-up Appointment(s) Schedule your appointment via [employer]   or contact Scheduling [phone]   Billing Review your statement via [employer]  or contact Billing [phone]   Medical Records Review your records via [employer]   or contact Medical Records [phone]     You may receive a telephone call within two days of discharge. This call is to make certain you understand your discharge instructions and have the opportunity to have any questions answered. You can also easily access your medical information, test results and upcoming appointments via the [employer] free online health management tool. You can learn more and sign up at [email or web address]. For assistance setting up your [employer] account, please call [phone].    Once again, we want to ensure your discharge home is safe and that you have a clear understanding of any next steps in your care. If you have any questions or concerns, please do not hesitate to contact us, we are here for you. Thank you for choosing [facility] for your healthcare needs.    Sincerely,    Your [facility] Healthcare Team

## 2017-08-24 NOTE — OR SURGEON
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    Anesthesiologist/Type of Anesthesia:  Anesthesiologist: Rahul Purvis M.D./General    Surgical Staff:  Circulator: Jose Dowell R.N.; Tia Hargrove R.N.    Specimens:  * No specimens in log *    Estimated Blood Loss: Minimal    Findings: Stent emanating from left ureteral orifice, removed    Complications: None        8/24/2017 2:30 PM Johnathan Lubin

## 2017-09-06 ENCOUNTER — HOSPITAL ENCOUNTER (OUTPATIENT)
Dept: RADIOLOGY | Facility: MEDICAL CENTER | Age: 57
End: 2017-09-06
Attending: INTERNAL MEDICINE
Payer: COMMERCIAL

## 2017-09-06 DIAGNOSIS — C18.0 MALIGNANT NEOPLASM OF CECUM (HCC): ICD-10-CM

## 2017-09-06 PROCEDURE — 700117 HCHG RX CONTRAST REV CODE 255: Performed by: INTERNAL MEDICINE

## 2017-09-06 PROCEDURE — 71260 CT THORAX DX C+: CPT

## 2017-09-06 RX ADMIN — IOHEXOL 100 ML: 350 INJECTION, SOLUTION INTRAVENOUS at 10:17

## 2017-09-06 RX ADMIN — IOHEXOL 50 ML: 240 INJECTION, SOLUTION INTRATHECAL; INTRAVASCULAR; INTRAVENOUS; ORAL at 10:19

## 2017-09-12 ENCOUNTER — HOSPITAL ENCOUNTER (OUTPATIENT)
Facility: MEDICAL CENTER | Age: 57
End: 2017-09-12
Attending: INTERNAL MEDICINE
Payer: COMMERCIAL

## 2017-09-12 PROCEDURE — 80053 COMPREHEN METABOLIC PANEL: CPT

## 2017-09-13 LAB
ALBUMIN SERPL BCP-MCNC: 4 G/DL (ref 3.2–4.9)
ALBUMIN/GLOB SERPL: 1.5 G/DL
ALP SERPL-CCNC: 69 U/L (ref 30–99)
ALT SERPL-CCNC: 11 U/L (ref 2–50)
ANION GAP SERPL CALC-SCNC: 8 MMOL/L (ref 0–11.9)
AST SERPL-CCNC: 16 U/L (ref 12–45)
BILIRUB SERPL-MCNC: 0.4 MG/DL (ref 0.1–1.5)
BUN SERPL-MCNC: 12 MG/DL (ref 8–22)
CALCIUM SERPL-MCNC: 9.5 MG/DL (ref 8.5–10.5)
CHLORIDE SERPL-SCNC: 105 MMOL/L (ref 96–112)
CO2 SERPL-SCNC: 27 MMOL/L (ref 20–33)
CREAT SERPL-MCNC: 0.6 MG/DL (ref 0.5–1.4)
GFR SERPL CREATININE-BSD FRML MDRD: >60 ML/MIN/1.73 M 2
GLOBULIN SER CALC-MCNC: 2.6 G/DL (ref 1.9–3.5)
GLUCOSE SERPL-MCNC: 94 MG/DL (ref 65–99)
POTASSIUM SERPL-SCNC: 4 MMOL/L (ref 3.6–5.5)
PROT SERPL-MCNC: 6.6 G/DL (ref 6–8.2)
SODIUM SERPL-SCNC: 140 MMOL/L (ref 135–145)

## 2017-10-04 ENCOUNTER — HOSPITAL ENCOUNTER (OUTPATIENT)
Dept: RADIOLOGY | Facility: MEDICAL CENTER | Age: 57
End: 2017-10-04
Attending: UROLOGY
Payer: COMMERCIAL

## 2017-10-04 DIAGNOSIS — N13.30 HYDRONEPHROSIS, UNSPECIFIED HYDRONEPHROSIS TYPE: ICD-10-CM

## 2017-10-04 PROCEDURE — 76775 US EXAM ABDO BACK WALL LIM: CPT

## 2017-10-24 ENCOUNTER — HOSPITAL ENCOUNTER (OUTPATIENT)
Dept: LAB | Facility: MEDICAL CENTER | Age: 57
End: 2017-10-24
Attending: INTERNAL MEDICINE
Payer: COMMERCIAL

## 2017-10-24 LAB — GFR SERPL CREATININE-BSD FRML MDRD: >60 ML/MIN/1.73 M 2

## 2017-10-24 PROCEDURE — 80053 COMPREHEN METABOLIC PANEL: CPT

## 2017-10-24 PROCEDURE — 82378 CARCINOEMBRYONIC ANTIGEN: CPT

## 2017-10-25 LAB
ALBUMIN SERPL BCP-MCNC: 4.1 G/DL (ref 3.2–4.9)
ALBUMIN/GLOB SERPL: 1.5 G/DL
ALP SERPL-CCNC: 71 U/L (ref 30–99)
ALT SERPL-CCNC: 14 U/L (ref 2–50)
ANION GAP SERPL CALC-SCNC: 11 MMOL/L (ref 0–11.9)
AST SERPL-CCNC: 16 U/L (ref 12–45)
BILIRUB SERPL-MCNC: 0.6 MG/DL (ref 0.1–1.5)
BUN SERPL-MCNC: 11 MG/DL (ref 8–22)
CALCIUM SERPL-MCNC: 9.7 MG/DL (ref 8.5–10.5)
CEA SERPL-MCNC: 5.8 NG/ML (ref 0–3)
CHLORIDE SERPL-SCNC: 105 MMOL/L (ref 96–112)
CO2 SERPL-SCNC: 24 MMOL/L (ref 20–33)
CREAT SERPL-MCNC: 0.61 MG/DL (ref 0.5–1.4)
GLOBULIN SER CALC-MCNC: 2.7 G/DL (ref 1.9–3.5)
GLUCOSE SERPL-MCNC: 87 MG/DL (ref 65–99)
POTASSIUM SERPL-SCNC: 4.2 MMOL/L (ref 3.6–5.5)
PROT SERPL-MCNC: 6.8 G/DL (ref 6–8.2)
SODIUM SERPL-SCNC: 140 MMOL/L (ref 135–145)

## 2017-11-06 ENCOUNTER — PHYSICAL THERAPY (OUTPATIENT)
Dept: PHYSICAL THERAPY | Facility: MEDICAL CENTER | Age: 57
End: 2017-11-06
Attending: OBSTETRICS & GYNECOLOGY
Payer: COMMERCIAL

## 2017-11-06 DIAGNOSIS — N94.10 UNSPECIFIED DYSPAREUNIA (CODE): ICD-10-CM

## 2017-11-06 PROCEDURE — 97140 MANUAL THERAPY 1/> REGIONS: CPT

## 2017-11-06 PROCEDURE — 97162 PT EVAL MOD COMPLEX 30 MIN: CPT

## 2017-11-06 NOTE — OP THERAPY EVALUATION
Outpatient Physical Therapy  PELVIC FLOOR INITIAL EVALUATION    [facility]  [address]  [place] [address]  Phone:  [phone]  Fax:  [phone]    Date of Evaluation: 11/06/2017    Patient: Clau Lerner  YOB: 1960  MRN: [id number]     Referring Provider: Nubia Powers M.D.  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 Unspecified dyspareunia (CODE) N94.10       Subjective Pt states she had surgery in July to remove a large malignant mass in the abdominal region that was compressing one of her ureters and then had a stent placed (and then removed) in the ureter. She had 3 chemo treatments. She has a prior history of colon cancer in 2013. No history of radiation. She has had both ovaries and fallopian tubes removed as well. Since surgery in July she has experienced vaginal pain and tightness with intercourse, bladder pain with full bladder and feels she has to strain to completely empty her bladder. She has constipation due to being on pain medications which she has been decreasing slowly over time. She also reports abdominal tightness around the scar and feels better if she does yoga and stretches daily.    Past Medical History:   Diagnosis Date   • Cancer (CMS-[facility]) 2013    colon/chemo   • Cancer (CMS-HCC) 2016     unsure   • Colon cancer (CMS-HCC)    • Liver mass      Past Surgical History:   Procedure Laterality Date   • CYSTOSCOPY STENT REMOVAL Left 8/24/2017    Procedure: CYSTOSCOPY,  STENT REMOVAL ;  Surgeon: Johnathan Lubin M.D.;  Location: SURGERY [facility];  Service:    • CYSTOSCOPY STENT PLACEMENT Left 3/10/2017    Procedure: CYSTOSCOPY STENT PLACEMENT;  Surgeon: Johnathan Lubin M.D.;  Location: SURGERY [facility];  Service:    • RETROGRADES  3/10/2017    Procedure: RETROGRADES;  Surgeon: Johnathan Lubin M.D.;  Location: SURGERY [facility];  Service:    • PYELOGRAM  3/10/2017    Procedure: PYELOGRAM;  Surgeon: Johnathan Lubin M.D.;  Location: SURGERY [facility];  Service:    • OTHER  2017    power port insertion   • BOWEL RESECTION  2013   • OTHER  2013    power port insertion     OB History   No data available     Social History   Substance Use Topics   • Smoking status: Former Smoker     Packs/day: 0.50     Years: 15.00     Types: Cigarettes     Quit date: 1/3/2002   • Smokeless tobacco: Never Used   • Alcohol use No     Family and Occupational History     Social History   • Marital status:      Spouse name: N/A   • Number of children: N/A   • Years of education: N/A         Objective   Informed consent for internal examination given    Genitourinary:   Genitourinary Comments: External observation with absence of redness irritation, abrasions,or lesions   Palpation   Left   Hypertonic in the superficial transverse perineal, bulbocavernosus, ischiocavernosus, compressor urethrae, urethrovaginal muscle, pubococcygeus, puborectalis, iliococcygeus and coccygeus.  Trigger point to superficial transverse perineal, bulbocavernosus, ischiocavernosus, compressor urethrae, urethrovaginal muscle, pubococcygeus, puborectalis, iliococcygeus and coccygeus.  Right   Hypertonic in the superficial transverse perineal, bulbocavernosus, ischiocavernosus, compressor urethrae, urethrovaginal muscle,  pubococcygeus, puborectalis, iliococcygeus and coccygeus.  Trigger point to superficial transverse perineal, bulbocavernosus, ischiocavernosus, compressor urethrae, urethrovaginal muscle, pubococcygeus, puborectalis, iliococcygeus and coccygeus.  Palpation comments   Additional palpation details: Mild to moderate abdominal scar tissue adhesion especially at the umbilicus and distal portion of scar.    Pelvic floor exam     Vaginal vault size: decreased    PFM tone: increased    Voluntary contraction: weak  Pelvic floor exam additional details: Strength not tested due to severe PFM overactivity and pain    Exercises and Treatments   Therapeutic treatments and modalities     Manual therapy, 10 min, CTM of abdomen and scar tissue mobilization     Therapeutic exercises, 5 min, pt ed for self skin rolling to abdomen and scar tissue      Assessment, Goals and Plan   Assessment:     Pelvic floor muscle condition:  Overactive pelvic floor muscles    Rehabilitation potential:  Increased    Assessment details:  Clau is a 57 y.o. Female with c/o pain with intercourse and bladder pain. She demonstrates severe PFM overactivity, multiple internal trigger points, and mild to moderate abdominal scar tissue adhesions  contributing to her symptoms. PT has recommended vaginal dilators for self stretching at home.    Goals:   Clinical Goals     Problem: abdominal/perineal scar adhesion       Goal: normalize scar mobility    Problem: increased PFM resting tone       Goal: normalize PFM tone  Functional Goals     Problem: decreased sexual activity due to pain       Goal: resume sexual activity with decreased pain    Treatment Plan:     Treatment performed today:  Bladder and pelvic muscle education and evaluation/examination    Planned therapy interventions:  Manual therapy and neuromuscular re-education    Frequency of visits:  1x week    Duration in weeks:  10 weeks    Plan details:  Continue PT      Functional Limitation G-Codes and  Severity Modifiers  Current:     Goal:         Referring provider co-signature:  I have reviewed this plan of care and my co-signature certifies the need for services.  Certification Dates:   From 11/6/17     To 2/6/18    Physician Signature: ________________________________ Date: ______________

## 2017-11-08 ENCOUNTER — HOSPITAL ENCOUNTER (OUTPATIENT)
Dept: LAB | Facility: MEDICAL CENTER | Age: 57
End: 2017-11-08
Attending: UROLOGY
Payer: COMMERCIAL

## 2017-11-08 LAB
ANION GAP SERPL CALC-SCNC: 8 MMOL/L (ref 0–11.9)
BUN SERPL-MCNC: 10 MG/DL (ref 8–22)
CALCIUM SERPL-MCNC: 9.7 MG/DL (ref 8.5–10.5)
CHLORIDE SERPL-SCNC: 105 MMOL/L (ref 96–112)
CO2 SERPL-SCNC: 26 MMOL/L (ref 20–33)
CREAT SERPL-MCNC: 0.55 MG/DL (ref 0.5–1.4)
GFR SERPL CREATININE-BSD FRML MDRD: >60 ML/MIN/1.73 M 2
GLUCOSE SERPL-MCNC: 97 MG/DL (ref 65–99)
POTASSIUM SERPL-SCNC: 4.5 MMOL/L (ref 3.6–5.5)
SODIUM SERPL-SCNC: 139 MMOL/L (ref 135–145)

## 2017-11-08 PROCEDURE — 80048 BASIC METABOLIC PNL TOTAL CA: CPT

## 2017-11-08 PROCEDURE — 36415 COLL VENOUS BLD VENIPUNCTURE: CPT

## 2017-11-22 ENCOUNTER — APPOINTMENT (OUTPATIENT)
Dept: PHYSICAL THERAPY | Facility: MEDICAL CENTER | Age: 57
End: 2017-11-22
Attending: OBSTETRICS & GYNECOLOGY
Payer: COMMERCIAL

## 2017-11-29 ENCOUNTER — APPOINTMENT (OUTPATIENT)
Dept: PHYSICAL THERAPY | Facility: MEDICAL CENTER | Age: 57
End: 2017-11-29
Attending: OBSTETRICS & GYNECOLOGY
Payer: COMMERCIAL

## 2017-12-04 ENCOUNTER — APPOINTMENT (OUTPATIENT)
Dept: PHYSICAL THERAPY | Facility: MEDICAL CENTER | Age: 57
End: 2017-12-04
Attending: OBSTETRICS & GYNECOLOGY
Payer: COMMERCIAL

## 2017-12-06 ENCOUNTER — HOSPITAL ENCOUNTER (OUTPATIENT)
Dept: RADIOLOGY | Facility: MEDICAL CENTER | Age: 57
End: 2017-12-06
Attending: INTERNAL MEDICINE
Payer: COMMERCIAL

## 2017-12-06 DIAGNOSIS — C18.0 MALIGNANT NEOPLASM OF CECUM (HCC): ICD-10-CM

## 2017-12-06 PROCEDURE — 71260 CT THORAX DX C+: CPT

## 2017-12-06 PROCEDURE — 700117 HCHG RX CONTRAST REV CODE 255: Performed by: INTERNAL MEDICINE

## 2017-12-06 RX ADMIN — IOHEXOL 100 ML: 350 INJECTION, SOLUTION INTRAVENOUS at 09:52

## 2017-12-06 RX ADMIN — IOHEXOL 50 ML: 240 INJECTION, SOLUTION INTRATHECAL; INTRAVASCULAR; INTRAVENOUS; ORAL at 09:53

## 2017-12-11 ENCOUNTER — APPOINTMENT (OUTPATIENT)
Dept: PHYSICAL THERAPY | Facility: MEDICAL CENTER | Age: 57
End: 2017-12-11
Attending: OBSTETRICS & GYNECOLOGY
Payer: COMMERCIAL

## 2017-12-12 ENCOUNTER — HOSPITAL ENCOUNTER (OUTPATIENT)
Facility: MEDICAL CENTER | Age: 57
End: 2017-12-12
Attending: INTERNAL MEDICINE
Payer: COMMERCIAL

## 2017-12-12 LAB
ALBUMIN SERPL BCP-MCNC: 3.9 G/DL (ref 3.2–4.9)
ALBUMIN/GLOB SERPL: 1.4 G/DL
ALP SERPL-CCNC: 70 U/L (ref 30–99)
ALT SERPL-CCNC: 9 U/L (ref 2–50)
ANION GAP SERPL CALC-SCNC: 8 MMOL/L (ref 0–11.9)
AST SERPL-CCNC: 15 U/L (ref 12–45)
BILIRUB SERPL-MCNC: 0.5 MG/DL (ref 0.1–1.5)
BUN SERPL-MCNC: 11 MG/DL (ref 8–22)
CALCIUM SERPL-MCNC: 9.4 MG/DL (ref 8.5–10.5)
CEA SERPL-MCNC: 7 NG/ML (ref 0–3)
CHLORIDE SERPL-SCNC: 105 MMOL/L (ref 96–112)
CO2 SERPL-SCNC: 26 MMOL/L (ref 20–33)
CREAT SERPL-MCNC: 0.53 MG/DL (ref 0.5–1.4)
GFR SERPL CREATININE-BSD FRML MDRD: >60 ML/MIN/1.73 M 2
GLOBULIN SER CALC-MCNC: 2.7 G/DL (ref 1.9–3.5)
GLUCOSE SERPL-MCNC: 95 MG/DL (ref 65–99)
POTASSIUM SERPL-SCNC: 4.1 MMOL/L (ref 3.6–5.5)
PROT SERPL-MCNC: 6.6 G/DL (ref 6–8.2)
SODIUM SERPL-SCNC: 139 MMOL/L (ref 135–145)

## 2017-12-12 PROCEDURE — 80053 COMPREHEN METABOLIC PANEL: CPT

## 2017-12-12 PROCEDURE — 82378 CARCINOEMBRYONIC ANTIGEN: CPT

## 2017-12-18 ENCOUNTER — APPOINTMENT (OUTPATIENT)
Dept: PHYSICAL THERAPY | Facility: MEDICAL CENTER | Age: 57
End: 2017-12-18
Attending: OBSTETRICS & GYNECOLOGY
Payer: COMMERCIAL

## 2018-01-10 ENCOUNTER — HOSPITAL ENCOUNTER (INPATIENT)
Facility: MEDICAL CENTER | Age: 58
LOS: 11 days | DRG: 330 | End: 2018-01-21
Attending: COLON & RECTAL SURGERY | Admitting: COLON & RECTAL SURGERY
Payer: COMMERCIAL

## 2018-01-10 ENCOUNTER — HOSPITAL ENCOUNTER (OUTPATIENT)
Facility: MEDICAL CENTER | Age: 58
DRG: 330 | End: 2018-01-10
Payer: COMMERCIAL

## 2018-01-10 DIAGNOSIS — C18.0 MALIGNANT NEOPLASM OF CECUM (HCC): ICD-10-CM

## 2018-01-10 DIAGNOSIS — Z98.890 POSTOPERATIVE STATE: ICD-10-CM

## 2018-01-10 DIAGNOSIS — Z43.3 COLOSTOMY CARE (HCC): ICD-10-CM

## 2018-01-10 LAB — EKG IMPRESSION: NORMAL

## 2018-01-10 PROCEDURE — 700111 HCHG RX REV CODE 636 W/ 250 OVERRIDE (IP): Performed by: COLON & RECTAL SURGERY

## 2018-01-10 PROCEDURE — 700105 HCHG RX REV CODE 258: Performed by: COLON & RECTAL SURGERY

## 2018-01-10 PROCEDURE — 94760 N-INVAS EAR/PLS OXIMETRY 1: CPT

## 2018-01-10 PROCEDURE — 700102 HCHG RX REV CODE 250 W/ 637 OVERRIDE(OP): Performed by: COLON & RECTAL SURGERY

## 2018-01-10 PROCEDURE — 700101 HCHG RX REV CODE 250: Performed by: COLON & RECTAL SURGERY

## 2018-01-10 PROCEDURE — 93010 ELECTROCARDIOGRAM REPORT: CPT | Performed by: INTERNAL MEDICINE

## 2018-01-10 PROCEDURE — 302086: Performed by: COLON & RECTAL SURGERY

## 2018-01-10 PROCEDURE — A9270 NON-COVERED ITEM OR SERVICE: HCPCS | Performed by: COLON & RECTAL SURGERY

## 2018-01-10 PROCEDURE — 770006 HCHG ROOM/CARE - MED/SURG/GYN SEMI*

## 2018-01-10 PROCEDURE — 302087 SET,INFUSION NEEDLE 22X1 1/2": Performed by: COLON & RECTAL SURGERY

## 2018-01-10 PROCEDURE — 93005 ELECTROCARDIOGRAM TRACING: CPT | Performed by: COLON & RECTAL SURGERY

## 2018-01-10 PROCEDURE — 94640 AIRWAY INHALATION TREATMENT: CPT

## 2018-01-10 RX ORDER — ONDANSETRON 2 MG/ML
4 INJECTION INTRAMUSCULAR; INTRAVENOUS EVERY 4 HOURS PRN
Status: DISCONTINUED | OUTPATIENT
Start: 2018-01-10 | End: 2018-01-21 | Stop reason: HOSPADM

## 2018-01-10 RX ORDER — MORPHINE SULFATE 4 MG/ML
2-4 INJECTION, SOLUTION INTRAMUSCULAR; INTRAVENOUS
Status: DISCONTINUED | OUTPATIENT
Start: 2018-01-10 | End: 2018-01-12

## 2018-01-10 RX ORDER — HYDRALAZINE HYDROCHLORIDE 20 MG/ML
10 INJECTION INTRAMUSCULAR; INTRAVENOUS EVERY 6 HOURS PRN
Status: DISCONTINUED | OUTPATIENT
Start: 2018-01-10 | End: 2018-01-21 | Stop reason: HOSPADM

## 2018-01-10 RX ORDER — ALBUTEROL SULFATE 90 UG/1
2 AEROSOL, METERED RESPIRATORY (INHALATION) EVERY 4 HOURS PRN
Status: DISCONTINUED | OUTPATIENT
Start: 2018-01-10 | End: 2018-01-21 | Stop reason: HOSPADM

## 2018-01-10 RX ORDER — LORAZEPAM 2 MG/ML
1 INJECTION INTRAMUSCULAR EVERY 6 HOURS PRN
Status: DISCONTINUED | OUTPATIENT
Start: 2018-01-10 | End: 2018-01-21 | Stop reason: HOSPADM

## 2018-01-10 RX ORDER — DIPHENHYDRAMINE HYDROCHLORIDE 50 MG/ML
25-50 INJECTION INTRAMUSCULAR; INTRAVENOUS EVERY 6 HOURS PRN
Status: DISCONTINUED | OUTPATIENT
Start: 2018-01-10 | End: 2018-01-21 | Stop reason: HOSPADM

## 2018-01-10 RX ORDER — ACETAMINOPHEN 650 MG/1
650 SUPPOSITORY RECTAL EVERY 6 HOURS PRN
Status: DISCONTINUED | OUTPATIENT
Start: 2018-01-10 | End: 2018-01-21 | Stop reason: HOSPADM

## 2018-01-10 RX ORDER — MORPHINE SULFATE 15 MG/1
30 TABLET, FILM COATED, EXTENDED RELEASE ORAL EVERY 12 HOURS
Status: DISCONTINUED | OUTPATIENT
Start: 2018-01-10 | End: 2018-01-21 | Stop reason: HOSPADM

## 2018-01-10 RX ORDER — IPRATROPIUM BROMIDE AND ALBUTEROL SULFATE 2.5; .5 MG/3ML; MG/3ML
3 SOLUTION RESPIRATORY (INHALATION)
Status: DISCONTINUED | OUTPATIENT
Start: 2018-01-10 | End: 2018-01-21 | Stop reason: HOSPADM

## 2018-01-10 RX ORDER — MORPHINE SULFATE 30 MG/1
30 TABLET, FILM COATED, EXTENDED RELEASE ORAL EVERY 12 HOURS
COMMUNITY

## 2018-01-10 RX ORDER — MORPHINE SULFATE 15 MG/1
15 TABLET, FILM COATED, EXTENDED RELEASE ORAL EVERY 12 HOURS
Status: DISCONTINUED | OUTPATIENT
Start: 2018-01-10 | End: 2018-01-10

## 2018-01-10 RX ORDER — ACETAMINOPHEN 500 MG
500 TABLET ORAL EVERY 6 HOURS PRN
Status: DISCONTINUED | OUTPATIENT
Start: 2018-01-10 | End: 2018-01-21 | Stop reason: HOSPADM

## 2018-01-10 RX ORDER — ENALAPRILAT 1.25 MG/ML
2.5 INJECTION INTRAVENOUS EVERY 4 HOURS PRN
Status: DISCONTINUED | OUTPATIENT
Start: 2018-01-10 | End: 2018-01-21 | Stop reason: HOSPADM

## 2018-01-10 RX ORDER — DIPHENHYDRAMINE HCL 25 MG
25-50 TABLET ORAL EVERY 6 HOURS PRN
Status: DISCONTINUED | OUTPATIENT
Start: 2018-01-10 | End: 2018-01-21 | Stop reason: HOSPADM

## 2018-01-10 RX ADMIN — POTASSIUM CHLORIDE: 2 INJECTION, SOLUTION, CONCENTRATE INTRAVENOUS at 18:31

## 2018-01-10 RX ADMIN — ENOXAPARIN SODIUM 30 MG: 100 INJECTION SUBCUTANEOUS at 20:41

## 2018-01-10 RX ADMIN — IPRATROPIUM BROMIDE AND ALBUTEROL SULFATE 3 ML: .5; 3 SOLUTION RESPIRATORY (INHALATION) at 21:03

## 2018-01-10 RX ADMIN — MORPHINE SULFATE 30 MG: 30 TABLET, EXTENDED RELEASE ORAL at 20:36

## 2018-01-10 ASSESSMENT — PATIENT HEALTH QUESTIONNAIRE - PHQ9
1. LITTLE INTEREST OR PLEASURE IN DOING THINGS: NOT AT ALL
SUM OF ALL RESPONSES TO PHQ9 QUESTIONS 1 AND 2: 0
SUM OF ALL RESPONSES TO PHQ QUESTIONS 1-9: 0
2. FEELING DOWN, DEPRESSED, IRRITABLE, OR HOPELESS: NOT AT ALL

## 2018-01-10 ASSESSMENT — COPD QUESTIONNAIRES
HAVE YOU SMOKED AT LEAST 100 CIGARETTES IN YOUR ENTIRE LIFE: NO/DON'T KNOW
DURING THE PAST 4 WEEKS HOW MUCH DID YOU FEEL SHORT OF BREATH: NONE/LITTLE OF THE TIME
COPD SCREENING SCORE: 1
DO YOU EVER COUGH UP ANY MUCUS OR PHLEGM?: NO/ONLY WITH OCCASIONAL COLDS OR INFECTIONS
DO YOU EVER COUGH UP ANY MUCUS OR PHLEGM?: NO/ONLY WITH OCCASIONAL COLDS OR INFECTIONS
HAVE YOU SMOKED AT LEAST 100 CIGARETTES IN YOUR ENTIRE LIFE: NO/DON'T KNOW
DURING THE PAST 4 WEEKS HOW MUCH DID YOU FEEL SHORT OF BREATH: NONE/LITTLE OF THE TIME
COPD SCREENING SCORE: 1

## 2018-01-10 ASSESSMENT — LIFESTYLE VARIABLES
EVER_SMOKED: NEVER
EVER_SMOKED: NEVER

## 2018-01-10 ASSESSMENT — PAIN SCALES - GENERAL
PAINLEVEL_OUTOF10: 6
PAINLEVEL_OUTOF10: 9

## 2018-01-10 NOTE — PROGRESS NOTES
Direct admit from MD office. Accepted by Dr. Rosen for bowel obstruction.  ADT signed & held, needs to be released upon pt arrival.  Written orders received, faxed to unit and scanned to media tab.  Pt coming by private car.

## 2018-01-11 ENCOUNTER — APPOINTMENT (OUTPATIENT)
Dept: RADIOLOGY | Facility: MEDICAL CENTER | Age: 58
DRG: 330 | End: 2018-01-11
Attending: COLON & RECTAL SURGERY
Payer: COMMERCIAL

## 2018-01-11 PROCEDURE — A9270 NON-COVERED ITEM OR SERVICE: HCPCS | Performed by: COLON & RECTAL SURGERY

## 2018-01-11 PROCEDURE — 770006 HCHG ROOM/CARE - MED/SURG/GYN SEMI*

## 2018-01-11 PROCEDURE — 700105 HCHG RX REV CODE 258: Performed by: COLON & RECTAL SURGERY

## 2018-01-11 PROCEDURE — 94760 N-INVAS EAR/PLS OXIMETRY 1: CPT

## 2018-01-11 PROCEDURE — 94640 AIRWAY INHALATION TREATMENT: CPT

## 2018-01-11 PROCEDURE — 700102 HCHG RX REV CODE 250 W/ 637 OVERRIDE(OP): Performed by: COLON & RECTAL SURGERY

## 2018-01-11 PROCEDURE — 700111 HCHG RX REV CODE 636 W/ 250 OVERRIDE (IP): Performed by: COLON & RECTAL SURGERY

## 2018-01-11 PROCEDURE — 71046 X-RAY EXAM CHEST 2 VIEWS: CPT

## 2018-01-11 PROCEDURE — 700101 HCHG RX REV CODE 250: Performed by: COLON & RECTAL SURGERY

## 2018-01-11 RX ADMIN — MORPHINE SULFATE 30 MG: 30 TABLET, EXTENDED RELEASE ORAL at 09:29

## 2018-01-11 RX ADMIN — MORPHINE SULFATE 4 MG: 4 INJECTION INTRAVENOUS at 22:19

## 2018-01-11 RX ADMIN — MORPHINE SULFATE 4 MG: 4 INJECTION INTRAVENOUS at 17:30

## 2018-01-11 RX ADMIN — MORPHINE SULFATE 4 MG: 4 INJECTION INTRAVENOUS at 20:24

## 2018-01-11 RX ADMIN — MORPHINE SULFATE 4 MG: 4 INJECTION INTRAVENOUS at 06:33

## 2018-01-11 RX ADMIN — POTASSIUM CHLORIDE: 2 INJECTION, SOLUTION, CONCENTRATE INTRAVENOUS at 09:32

## 2018-01-11 RX ADMIN — MORPHINE SULFATE 4 MG: 4 INJECTION INTRAVENOUS at 02:20

## 2018-01-11 RX ADMIN — ENOXAPARIN SODIUM 30 MG: 100 INJECTION SUBCUTANEOUS at 20:24

## 2018-01-11 RX ADMIN — POTASSIUM CHLORIDE: 2 INJECTION, SOLUTION, CONCENTRATE INTRAVENOUS at 18:43

## 2018-01-11 RX ADMIN — MORPHINE SULFATE 4 MG: 4 INJECTION INTRAVENOUS at 00:22

## 2018-01-11 RX ADMIN — FAMOTIDINE 20 MG: 10 INJECTION, SOLUTION INTRAVENOUS at 09:28

## 2018-01-11 RX ADMIN — MORPHINE SULFATE 30 MG: 30 TABLET, EXTENDED RELEASE ORAL at 20:24

## 2018-01-11 RX ADMIN — MORPHINE SULFATE 4 MG: 4 INJECTION INTRAVENOUS at 13:56

## 2018-01-11 RX ADMIN — IPRATROPIUM BROMIDE AND ALBUTEROL SULFATE 3 ML: .5; 3 SOLUTION RESPIRATORY (INHALATION) at 12:51

## 2018-01-11 ASSESSMENT — PAIN SCALES - GENERAL
PAINLEVEL_OUTOF10: 8
PAINLEVEL_OUTOF10: 9
PAINLEVEL_OUTOF10: 8
PAINLEVEL_OUTOF10: 8
PAINLEVEL_OUTOF10: ASSUMED PAIN PRESENT
PAINLEVEL_OUTOF10: 8
PAINLEVEL_OUTOF10: 6
PAINLEVEL_OUTOF10: 7

## 2018-01-11 ASSESSMENT — ENCOUNTER SYMPTOMS
VOMITING: 0
SHORTNESS OF BREATH: 0
COUGH: 0
HEARTBURN: 0
FEVER: 0
NAUSEA: 1
DIARRHEA: 0
ABDOMINAL PAIN: 1
CONSTIPATION: 1
CHILLS: 0

## 2018-01-11 NOTE — FLOWSHEET NOTE
01/11/18 1250   Events/Summary/Plan   Events/Summary/Plan pt wants prn tmt, states has water on lung and bowels are making it difficult to take a deep breath.   (attempted to educate pt )   Interdisciplinary Plan of Care-Goals (Indications)   Obstructive Ventilatory Defect or Pulmonary Disease without Obvious Obstruction Strong Subjective / Objective Improvement   Interdisciplinary Plan of Care-Outcomes    Bronchodilator Outcome Patient at Stable Baseline   Education   Education Yes - Pt. / Family has been Instructed in use of Respiratory Equipment   RT Assessment of Delivered Medications   Evaluation of Medication Delivery Daily Yes-- Pt /Family has been Instructed in use of Respiratory Medications and Adverse Reactions   SVN Group   #SVN Performed Yes   Given By: Mouthpiece   Respiratory WDL   Respiratory (WDL) X   Chest Exam   Respiration 16   Pulse 92   Breath Sounds   RUL Breath Sounds Clear   RML Breath Sounds Clear   RLL Breath Sounds Clear   MELISSA Breath Sounds Clear   LLL Breath Sounds Clear   Oximetry   #Pulse Oximetry (Single Determination) Yes   Oxygen   Pulse Oximetry 94 %   O2 (LPM) 0   O2 (FiO2) 21   O2 Daily Delivery Respiratory  Room Air with O2 Available

## 2018-01-11 NOTE — CARE PLAN
Problem: Infection  Goal: Will remain free from infection    Intervention: Assess signs and symptoms of infection  Accessed port with sterile technique, prior to access, port site is slightly red, no swelling or drainage noted, to be accessed while in hospital, CHG baths to be performed nightly.       Problem: Knowledge Deficit  Goal: Knowledge of disease process/condition, treatment plan, diagnostic tests, and medications will improve    Intervention: Explain information regarding disease process/condition, treatment plan, diagnostic tests, and medications and document in education  Discussed plan of care with patient and , verbalized understanding, questions answered.

## 2018-01-11 NOTE — PROGRESS NOTES
"Contacted Dr. Gardner office. Talked to Wale, She said \"I dont know anything about this pt. Please contact Dr Rosen..\" Talked to her about starting and PIV and then contacting DR. Rosen in the morning.  "

## 2018-01-11 NOTE — WOUND TEAM
"Ostomy Pre-Operative Marking and Teaching       Date of Surgery: 2018  Type of Surgery: colostomy creation  Surgeon: Silas    Subjective: \"I have no idea what is being done to me tomorrow\".    Objective: Assessed patient to identify potential stoma site within her line of site on a flat pouching surface, within the rectus muscle, away from belt-line, bony prominences, exiting scars and umbilicus.    Assessment:  Potential site (s) located for: Colostomy_x___, Ileostomy____  Urostomy Other_________  1. Procedure explained to the patient.  2. Rectus muscle identified with patient in supine position.  3. Appropriate abdominal quadrant for planned surgical procedure identified.  4. Existing scars identified.  5. Potential sites evaluated with patient:      a. Supine      b. Sitting       c. Bending forward/ twisting at waist   6. Site selected with respect to skin folds, creases, abdominal contours and belt line.  7. Special considerations:         Ambulatory  8. Potential site (s) marked with an \"X\" (skin prepped with skin protectant wipe, kacy applied with indelible marker, kacy fixed with skin protectant wipe again.                       Site(s) marked:     Patient Teachin. Reviewed nature of surgical procedure.  2. Reviewed basic anatomy and function of the GI or  Tract.  3. Reviewed and provided the patient with literature-Colostomy, Ileostomy, Urostomy,     Other_______________.  4. Pouching system with hands on demonstrated.  5. Questions and concerns addressed  6. Other    Plan: Patient to OR tomorrow. Requested she be place on CNU or GSU. Consult placed for cancer nurse navigator.       "

## 2018-01-11 NOTE — DIETARY
Nutrition Services:    Poor PO on Nutrition Admit Screen. Pt admitted with bowel obstruction and is currently NPO x 1 day. Ht: 154.7 cm, Wt: 46.7 kg, BMI 19.52. Meds, labs and ADLs reviewed. RD will monitor for diet advancement and adequate PO intake    PLAN/RECOMMEND -   1) Nutrition rep to see patient daily for meal and snack preferences, when diet starts  2) Start PO diet when medically feasible.   3) Weekly weights to monitor fluid and nutrition status  4) Obtain supplement order per RD as needed.    RD following

## 2018-01-11 NOTE — CARE PLAN
Problem: Safety  Goal: Will remain free from falls    Intervention: Implement fall precautions  Pt calls appropriately, treaded socks in use. Personal belongings and call light with in reach and bed is locked in lowest position.      Problem: Pain Management  Goal: Pain level will decrease to patient's comfort goal    Intervention: Follow pain managment plan developed in collaboration with patient and Interdisciplinary Team  IV morphine for pain. Patient does ask appropriately

## 2018-01-11 NOTE — CARE PLAN
Problem: Communication  Goal: The ability to communicate needs accurately and effectively will improve  Outcome: PROGRESSING AS EXPECTED  Pt is educated about her plan of care.Pt speaks english so no translation help is needed. Pt can ask any questions and will have them answered by me.    Problem: Venous Thromboembolism (VTW)/Deep Vein Thrombosis (DVT) Prevention:  Goal: Patient will participate in Venous Thrombosis (VTE)/Deep Vein Thrombosis (DVT)Prevention Measures  Outcome: PROGRESSING AS EXPECTED  No s/s of DVT. Pt is on lovenox. She walks occasionally and wears SCDs.

## 2018-01-12 ENCOUNTER — APPOINTMENT (OUTPATIENT)
Dept: RADIOLOGY | Facility: MEDICAL CENTER | Age: 58
DRG: 330 | End: 2018-01-12
Attending: COLON & RECTAL SURGERY
Payer: COMMERCIAL

## 2018-01-12 LAB
ALBUMIN SERPL BCP-MCNC: 3.3 G/DL (ref 3.2–4.9)
ALBUMIN/GLOB SERPL: 1.1 G/DL
ALP SERPL-CCNC: 67 U/L (ref 30–99)
ALT SERPL-CCNC: 15 U/L (ref 2–50)
ANION GAP SERPL CALC-SCNC: 13 MMOL/L (ref 0–11.9)
APTT PPP: 29.3 SEC (ref 24.7–36)
AST SERPL-CCNC: 23 U/L (ref 12–45)
BASOPHILS # BLD AUTO: 0 % (ref 0–1.8)
BASOPHILS # BLD: 0 K/UL (ref 0–0.12)
BILIRUB SERPL-MCNC: 0.4 MG/DL (ref 0.1–1.5)
BUN SERPL-MCNC: 3 MG/DL (ref 8–22)
CALCIUM SERPL-MCNC: 8.7 MG/DL (ref 8.5–10.5)
CHLORIDE SERPL-SCNC: 104 MMOL/L (ref 96–112)
CO2 SERPL-SCNC: 20 MMOL/L (ref 20–33)
CREAT SERPL-MCNC: 0.51 MG/DL (ref 0.5–1.4)
EOSINOPHIL # BLD AUTO: 0.07 K/UL (ref 0–0.51)
EOSINOPHIL NFR BLD: 1.7 % (ref 0–6.9)
ERYTHROCYTE [DISTWIDTH] IN BLOOD BY AUTOMATED COUNT: 42.5 FL (ref 35.9–50)
GLOBULIN SER CALC-MCNC: 3.1 G/DL (ref 1.9–3.5)
GLUCOSE SERPL-MCNC: 118 MG/DL (ref 65–99)
HCT VFR BLD AUTO: 34.6 % (ref 37–47)
HGB BLD-MCNC: 11 G/DL (ref 12–16)
INR PPP: 1.16 (ref 0.87–1.13)
LYMPHOCYTES # BLD AUTO: 1.33 K/UL (ref 1–4.8)
LYMPHOCYTES NFR BLD: 32.5 % (ref 22–41)
MANUAL DIFF BLD: NORMAL
MCH RBC QN AUTO: 29.5 PG (ref 27–33)
MCHC RBC AUTO-ENTMCNC: 31.8 G/DL (ref 33.6–35)
MCV RBC AUTO: 92.8 FL (ref 81.4–97.8)
MONOCYTES # BLD AUTO: 0.25 K/UL (ref 0–0.85)
MONOCYTES NFR BLD AUTO: 6.1 % (ref 0–13.4)
MORPHOLOGY BLD-IMP: NORMAL
NEUTROPHILS # BLD AUTO: 2.45 K/UL (ref 2–7.15)
NEUTROPHILS NFR BLD: 58.8 % (ref 44–72)
NEUTS BAND NFR BLD MANUAL: 0.9 % (ref 0–10)
NRBC # BLD AUTO: 0 K/UL
NRBC BLD-RTO: 0 /100 WBC
PLATELET # BLD AUTO: 195 K/UL (ref 164–446)
PLATELET BLD QL SMEAR: NORMAL
PMV BLD AUTO: 11.2 FL (ref 9–12.9)
POTASSIUM SERPL-SCNC: 3.3 MMOL/L (ref 3.6–5.5)
PROT SERPL-MCNC: 6.4 G/DL (ref 6–8.2)
PROTHROMBIN TIME: 14.5 SEC (ref 12–14.6)
RBC # BLD AUTO: 3.73 M/UL (ref 4.2–5.4)
RBC BLD AUTO: NORMAL
SODIUM SERPL-SCNC: 137 MMOL/L (ref 135–145)
WBC # BLD AUTO: 4.1 K/UL (ref 4.8–10.8)

## 2018-01-12 PROCEDURE — 160039 HCHG SURGERY MINUTES - EA ADDL 1 MIN LEVEL 3: Performed by: COLON & RECTAL SURGERY

## 2018-01-12 PROCEDURE — 85007 BL SMEAR W/DIFF WBC COUNT: CPT

## 2018-01-12 PROCEDURE — 700105 HCHG RX REV CODE 258: Performed by: COLON & RECTAL SURGERY

## 2018-01-12 PROCEDURE — 502240 HCHG MISC OR SUPPLY RC 0272: Performed by: COLON & RECTAL SURGERY

## 2018-01-12 PROCEDURE — A9270 NON-COVERED ITEM OR SERVICE: HCPCS | Performed by: COLON & RECTAL SURGERY

## 2018-01-12 PROCEDURE — 700111 HCHG RX REV CODE 636 W/ 250 OVERRIDE (IP)

## 2018-01-12 PROCEDURE — 501570 HCHG TROCAR, SEPARATOR: Performed by: COLON & RECTAL SURGERY

## 2018-01-12 PROCEDURE — 700111 HCHG RX REV CODE 636 W/ 250 OVERRIDE (IP): Performed by: COLON & RECTAL SURGERY

## 2018-01-12 PROCEDURE — 501838 HCHG SUTURE GENERAL: Performed by: COLON & RECTAL SURGERY

## 2018-01-12 PROCEDURE — 500380 HCHG DRAIN, PENROSE 1/4X12: Performed by: COLON & RECTAL SURGERY

## 2018-01-12 PROCEDURE — 700111 HCHG RX REV CODE 636 W/ 250 OVERRIDE (IP): Performed by: PHYSICIAN ASSISTANT

## 2018-01-12 PROCEDURE — 85027 COMPLETE CBC AUTOMATED: CPT

## 2018-01-12 PROCEDURE — 501583 HCHG TROCAR, THRD CAN&SEAL 5X100: Performed by: COLON & RECTAL SURGERY

## 2018-01-12 PROCEDURE — 700101 HCHG RX REV CODE 250

## 2018-01-12 PROCEDURE — 700102 HCHG RX REV CODE 250 W/ 637 OVERRIDE(OP): Performed by: COLON & RECTAL SURGERY

## 2018-01-12 PROCEDURE — 160036 HCHG PACU - EA ADDL 30 MINS PHASE I: Performed by: COLON & RECTAL SURGERY

## 2018-01-12 PROCEDURE — 94640 AIRWAY INHALATION TREATMENT: CPT

## 2018-01-12 PROCEDURE — 80053 COMPREHEN METABOLIC PANEL: CPT

## 2018-01-12 PROCEDURE — 85730 THROMBOPLASTIN TIME PARTIAL: CPT

## 2018-01-12 PROCEDURE — 501433 HCHG STAPLER, GIA MULTIFIRE 60/80: Performed by: COLON & RECTAL SURGERY

## 2018-01-12 PROCEDURE — 502571 HCHG PACK, LAP CHOLE: Performed by: COLON & RECTAL SURGERY

## 2018-01-12 PROCEDURE — 36598 INJ W/FLUOR EVAL CV DEVICE: CPT

## 2018-01-12 PROCEDURE — 0D1L4Z4 BYPASS TRANSVERSE COLON TO CUTANEOUS, PERCUTANEOUS ENDOSCOPIC APPROACH: ICD-10-PCS | Performed by: COLON & RECTAL SURGERY

## 2018-01-12 PROCEDURE — 160028 HCHG SURGERY MINUTES - 1ST 30 MINS LEVEL 3: Performed by: COLON & RECTAL SURGERY

## 2018-01-12 PROCEDURE — 160035 HCHG PACU - 1ST 60 MINS PHASE I: Performed by: COLON & RECTAL SURGERY

## 2018-01-12 PROCEDURE — 501445 HCHG STAPLER, SKIN DISP: Performed by: COLON & RECTAL SURGERY

## 2018-01-12 PROCEDURE — 94760 N-INVAS EAR/PLS OXIMETRY 1: CPT

## 2018-01-12 PROCEDURE — B5181ZZ FLUOROSCOPY OF SUPERIOR VENA CAVA USING LOW OSMOLAR CONTRAST: ICD-10-PCS | Performed by: RADIOLOGY

## 2018-01-12 PROCEDURE — 501571 HCHG TROCAR, SEPARATOR 12X100: Performed by: COLON & RECTAL SURGERY

## 2018-01-12 PROCEDURE — 770006 HCHG ROOM/CARE - MED/SURG/GYN SEMI*

## 2018-01-12 PROCEDURE — 160002 HCHG RECOVERY MINUTES (STAT): Performed by: COLON & RECTAL SURGERY

## 2018-01-12 PROCEDURE — 700101 HCHG RX REV CODE 250: Performed by: COLON & RECTAL SURGERY

## 2018-01-12 PROCEDURE — 160009 HCHG ANES TIME/MIN: Performed by: COLON & RECTAL SURGERY

## 2018-01-12 PROCEDURE — 700117 HCHG RX CONTRAST REV CODE 255: Performed by: COLON & RECTAL SURGERY

## 2018-01-12 PROCEDURE — 85610 PROTHROMBIN TIME: CPT

## 2018-01-12 PROCEDURE — 160048 HCHG OR STATISTICAL LEVEL 1-5: Performed by: COLON & RECTAL SURGERY

## 2018-01-12 RX ORDER — CEFAZOLIN SODIUM 2 G/100ML
2 INJECTION, SOLUTION INTRAVENOUS EVERY 8 HOURS
Status: DISCONTINUED | OUTPATIENT
Start: 2018-01-12 | End: 2018-01-13

## 2018-01-12 RX ORDER — ONDANSETRON 2 MG/ML
INJECTION INTRAMUSCULAR; INTRAVENOUS
Status: COMPLETED
Start: 2018-01-12 | End: 2018-01-12

## 2018-01-12 RX ORDER — BUPIVACAINE HYDROCHLORIDE AND EPINEPHRINE 5; 5 MG/ML; UG/ML
INJECTION, SOLUTION EPIDURAL; INTRACAUDAL; PERINEURAL
Status: DISCONTINUED | OUTPATIENT
Start: 2018-01-12 | End: 2018-01-12 | Stop reason: HOSPADM

## 2018-01-12 RX ORDER — HALOPERIDOL 5 MG/ML
INJECTION INTRAMUSCULAR
Status: COMPLETED
Start: 2018-01-12 | End: 2018-01-12

## 2018-01-12 RX ADMIN — ONDANSETRON 4 MG: 2 INJECTION INTRAMUSCULAR; INTRAVENOUS at 17:00

## 2018-01-12 RX ADMIN — FENTANYL CITRATE 25 MCG: 50 INJECTION, SOLUTION INTRAMUSCULAR; INTRAVENOUS at 17:18

## 2018-01-12 RX ADMIN — MORPHINE SULFATE 30 MG: 30 TABLET, EXTENDED RELEASE ORAL at 07:47

## 2018-01-12 RX ADMIN — POTASSIUM CHLORIDE: 2 INJECTION, SOLUTION, CONCENTRATE INTRAVENOUS at 03:38

## 2018-01-12 RX ADMIN — FENTANYL CITRATE 25 MCG: 50 INJECTION, SOLUTION INTRAMUSCULAR; INTRAVENOUS at 18:11

## 2018-01-12 RX ADMIN — HALOPERIDOL LACTATE 1 MG: 5 INJECTION, SOLUTION INTRAMUSCULAR at 17:23

## 2018-01-12 RX ADMIN — MORPHINE SULFATE 4 MG: 4 INJECTION INTRAVENOUS at 13:18

## 2018-01-12 RX ADMIN — FENTANYL CITRATE 25 MCG: 50 INJECTION, SOLUTION INTRAMUSCULAR; INTRAVENOUS at 17:05

## 2018-01-12 RX ADMIN — MORPHINE SULFATE 4 MG: 4 INJECTION INTRAVENOUS at 10:40

## 2018-01-12 RX ADMIN — IOHEXOL 6 ML: 300 INJECTION, SOLUTION INTRAVENOUS at 09:50

## 2018-01-12 RX ADMIN — CEFAZOLIN SODIUM 2 G: 2 INJECTION, SOLUTION INTRAVENOUS at 23:12

## 2018-01-12 RX ADMIN — IPRATROPIUM BROMIDE AND ALBUTEROL SULFATE 3 ML: .5; 3 SOLUTION RESPIRATORY (INHALATION) at 09:34

## 2018-01-12 RX ADMIN — FENTANYL CITRATE 25 MCG: 50 INJECTION, SOLUTION INTRAMUSCULAR; INTRAVENOUS at 18:16

## 2018-01-12 RX ADMIN — LORAZEPAM 1 MG: 2 INJECTION INTRAMUSCULAR; INTRAVENOUS at 18:59

## 2018-01-12 RX ADMIN — MORPHINE SULFATE 4 MG: 4 INJECTION INTRAVENOUS at 07:47

## 2018-01-12 RX ADMIN — MORPHINE SULFATE 4 MG: 4 INJECTION INTRAVENOUS at 03:48

## 2018-01-12 RX ADMIN — FAMOTIDINE 20 MG: 10 INJECTION, SOLUTION INTRAVENOUS at 19:50

## 2018-01-12 RX ADMIN — HYDROMORPHONE HYDROCHLORIDE 5 MG: 2 INJECTION INTRAMUSCULAR; INTRAVENOUS; SUBCUTANEOUS at 20:14

## 2018-01-12 RX ADMIN — MORPHINE SULFATE 4 MG: 4 INJECTION INTRAVENOUS at 01:52

## 2018-01-12 ASSESSMENT — PAIN SCALES - GENERAL
PAINLEVEL_OUTOF10: 9
PAINLEVEL_OUTOF10: 10
PAINLEVEL_OUTOF10: 10
PAINLEVEL_OUTOF10: 7
PAINLEVEL_OUTOF10: 3
PAINLEVEL_OUTOF10: 8
PAINLEVEL_OUTOF10: 10
PAINLEVEL_OUTOF10: 4
PAINLEVEL_OUTOF10: 7
PAINLEVEL_OUTOF10: 9
PAINLEVEL_OUTOF10: 3
PAINLEVEL_OUTOF10: 7
PAINLEVEL_OUTOF10: 3
PAINLEVEL_OUTOF10: 6

## 2018-01-12 ASSESSMENT — ENCOUNTER SYMPTOMS
PALPITATIONS: 0
FEVER: 0
ABDOMINAL PAIN: 1
SHORTNESS OF BREATH: 1
NAUSEA: 0
CHILLS: 0
VOMITING: 0
DIARRHEA: 0

## 2018-01-12 ASSESSMENT — LIFESTYLE VARIABLES: DO YOU DRINK ALCOHOL: NO

## 2018-01-12 ASSESSMENT — PAIN SCALES - WONG BAKER: WONGBAKER_NUMERICALRESPONSE: HURTS EVEN MORE

## 2018-01-12 NOTE — PROGRESS NOTES
Called MD regarding Pt.'s code status not filed, order given by Jcarlos BOUDREAUX to update code to Full code.

## 2018-01-12 NOTE — CARE PLAN
Problem: Safety  Goal: Will remain free from injury  Outcome: PROGRESSING AS EXPECTED  Treaded socks in place, bed in the lowest position, call light and belongings within reach, pt call for assistance appropriately    Problem: Pain Management  Goal: Pain level will decrease to patient's comfort goal  Outcome: PROGRESSING AS EXPECTED  Pt. Taking morphine 4mg q 2-3hrs with adequate pain control.

## 2018-01-12 NOTE — PROGRESS NOTES
Surgical Progress Note    Author: Hortencia Ha Date & Time created: 2018   9:02 AM     Interval Events:  Direct admit for SBO. Plan for surgery this afternoon. She reports that she has not been to IR yet for evaluation of her port. She reports passing flatus all day yesterday. No BM for 5 days. She has been NPO. Ostomy/wound team saw her yesterday. She is asking for a breathing treatment for shortness of breath.   Review of Systems   Constitutional: Negative for chills and fever.   Respiratory: Positive for shortness of breath.    Cardiovascular: Negative for chest pain and palpitations.   Gastrointestinal: Positive for abdominal pain (mild). Negative for diarrhea, nausea and vomiting.   Genitourinary: Negative for dysuria.     Hemodynamics:  Temp (24hrs), Av.8 °C (98.3 °F), Min:36.7 °C (98 °F), Max:37 °C (98.6 °F)  Temperature: 36.7 °C (98 °F)  Pulse  Av.2  Min: 72  Max: 97   Blood Pressure: 118/76     Respiratory:    Respiration: 16, Pulse Oximetry: 93 %, O2 Daily Delivery Respiratory : Room Air with O2 Available     Given By:: Mouthpiece  RUL Breath Sounds: Clear, RML Breath Sounds: Clear, RLL Breath Sounds: Diminished, MELISSA Breath Sounds: Clear, LLL Breath Sounds: Diminished  Neuro:  GCS       Fluids:    Intake/Output Summary (Last 24 hours) at 18 0902  Last data filed at 18 0643   Gross per 24 hour   Intake             1500 ml   Output                0 ml   Net             1500 ml        Current Diet Order   Procedures   • DIET NPO     Physical Exam   Constitutional: She is oriented to person, place, and time. She appears well-developed and well-nourished. No distress.   HENT:   Head: Normocephalic and atraumatic.   Eyes: Conjunctivae are normal.   Neck: Normal range of motion. Neck supple.   Cardiovascular: Normal rate and regular rhythm.    Pulmonary/Chest: Effort normal. No respiratory distress.   Abdominal: Soft. She exhibits distension (mild). She exhibits no mass. There is  tenderness. There is no rebound and no guarding.   Musculoskeletal: Normal range of motion.   Neurological: She is alert and oriented to person, place, and time.   Skin: Skin is warm and dry. No rash noted. No erythema. No pallor.   Ostomy marking noted.   Psychiatric: She has a normal mood and affect.     Labs:  No results found for this or any previous visit (from the past 24 hour(s)).  Medical Decision Making, by Problem:  There are no active hospital problems to display for this patient.    Plan:  VS reviewed. No labs for review at this time. She reports shortness of breath, however no dyspnea noted. She would like a breathing treatment. Will order respiratory protocol. IR today for eval of her port. Hold lovenox for surgery and IR. Need labs for preop, ordered. Plan for surgery this afternoon. Discussed with Dr. Rosen.      Quality Measures:    Junior catheter::  No Junior  DVT prophylaxis pharmacological::  Enoxaparin (Lovenox)      Discussed patient condition with Patient and Dr. Rosen

## 2018-01-12 NOTE — CARE PLAN
Problem: Communication  Goal: The ability to communicate needs accurately and effectively will improve    Intervention: Educate patient and significant other/support system about the plan of care, procedures, treatments, medications and allow for questions  White board updated with [facility] staff and return time.  PT notified of hourly rounding and unit routine.   Appropriate signs in place at doorway for pt.       Problem: Safety  Goal: Will remain free from falls    Intervention: Implement fall precautions  Pt calls appropriately, treaded socks in use. Personal belongings and call light with in reach and bed is locked in lowest position.

## 2018-01-12 NOTE — PROGRESS NOTES
Patient is ready to go to surgery, NPO, Ambulated to bathroom to void,  at bedside. Will transfer to [facility] after surgery, post op.

## 2018-01-12 NOTE — PROGRESS NOTES
Port check performed. Port catheter tip with fibrin sheath preventing aspiration. No thrombus or vessel stenosis. Port flushed well. No catheter discontinuity or malpositioning evident. Attempts to convey findings to Dr. Rosen were initiated on 1/12/18 at 0956 hours.

## 2018-01-12 NOTE — PROGRESS NOTES
Patient NPO for surgery today. Awake and alert. PORT right chest in place. Morphine IV for pain. Plan on transfer to [facility] post surgery, (per Dr. Rosen) order in place.

## 2018-01-12 NOTE — PROGRESS NOTES
Port Check performed by Dr. Danielle.  Port flushed with 5mL of 100units/mL heparin following check.  Pt sent back to floor, comfortable and in good spirits.

## 2018-01-13 LAB
ALBUMIN SERPL BCP-MCNC: 2.8 G/DL (ref 3.2–4.9)
ALBUMIN/GLOB SERPL: 1.3 G/DL
ALP SERPL-CCNC: 50 U/L (ref 30–99)
ALT SERPL-CCNC: 10 U/L (ref 2–50)
ANION GAP SERPL CALC-SCNC: 10 MMOL/L (ref 0–11.9)
AST SERPL-CCNC: 18 U/L (ref 12–45)
BASOPHILS # BLD AUTO: 0 % (ref 0–1.8)
BASOPHILS # BLD: 0 K/UL (ref 0–0.12)
BILIRUB SERPL-MCNC: 0.3 MG/DL (ref 0.1–1.5)
BUN SERPL-MCNC: 5 MG/DL (ref 8–22)
CALCIUM SERPL-MCNC: 8 MG/DL (ref 8.5–10.5)
CHLORIDE SERPL-SCNC: 106 MMOL/L (ref 96–112)
CO2 SERPL-SCNC: 21 MMOL/L (ref 20–33)
CREAT SERPL-MCNC: 0.52 MG/DL (ref 0.5–1.4)
EOSINOPHIL # BLD AUTO: 0 K/UL (ref 0–0.51)
EOSINOPHIL NFR BLD: 0 % (ref 0–6.9)
ERYTHROCYTE [DISTWIDTH] IN BLOOD BY AUTOMATED COUNT: 41.5 FL (ref 35.9–50)
GLOBULIN SER CALC-MCNC: 2.2 G/DL (ref 1.9–3.5)
GLUCOSE SERPL-MCNC: 126 MG/DL (ref 65–99)
HCT VFR BLD AUTO: 29 % (ref 37–47)
HGB BLD-MCNC: 9.5 G/DL (ref 12–16)
LYMPHOCYTES # BLD AUTO: 0.72 K/UL (ref 1–4.8)
LYMPHOCYTES NFR BLD: 11.4 % (ref 22–41)
MANUAL DIFF BLD: NORMAL
MCH RBC QN AUTO: 29.7 PG (ref 27–33)
MCHC RBC AUTO-ENTMCNC: 32.8 G/DL (ref 33.6–35)
MCV RBC AUTO: 90.6 FL (ref 81.4–97.8)
MONOCYTES # BLD AUTO: 0.33 K/UL (ref 0–0.85)
MONOCYTES NFR BLD AUTO: 5.3 % (ref 0–13.4)
MORPHOLOGY BLD-IMP: NORMAL
NEUTROPHILS # BLD AUTO: 5.25 K/UL (ref 2–7.15)
NEUTROPHILS NFR BLD: 81.6 % (ref 44–72)
NEUTS BAND NFR BLD MANUAL: 1.7 % (ref 0–10)
NRBC # BLD AUTO: 0 K/UL
NRBC BLD-RTO: 0 /100 WBC
PLATELET # BLD AUTO: 172 K/UL (ref 164–446)
PLATELET BLD QL SMEAR: NORMAL
PMV BLD AUTO: 11.4 FL (ref 9–12.9)
POLYCHROMASIA BLD QL SMEAR: NORMAL
POTASSIUM SERPL-SCNC: 4 MMOL/L (ref 3.6–5.5)
PROT SERPL-MCNC: 5 G/DL (ref 6–8.2)
RBC # BLD AUTO: 3.2 M/UL (ref 4.2–5.4)
RBC BLD AUTO: PRESENT
SODIUM SERPL-SCNC: 137 MMOL/L (ref 135–145)
WBC # BLD AUTO: 6.3 K/UL (ref 4.8–10.8)

## 2018-01-13 PROCEDURE — 85027 COMPLETE CBC AUTOMATED: CPT

## 2018-01-13 PROCEDURE — 770006 HCHG ROOM/CARE - MED/SURG/GYN SEMI*

## 2018-01-13 PROCEDURE — 94760 N-INVAS EAR/PLS OXIMETRY 1: CPT

## 2018-01-13 PROCEDURE — A9270 NON-COVERED ITEM OR SERVICE: HCPCS | Performed by: COLON & RECTAL SURGERY

## 2018-01-13 PROCEDURE — 80053 COMPREHEN METABOLIC PANEL: CPT

## 2018-01-13 PROCEDURE — 700111 HCHG RX REV CODE 636 W/ 250 OVERRIDE (IP): Performed by: COLON & RECTAL SURGERY

## 2018-01-13 PROCEDURE — 94640 AIRWAY INHALATION TREATMENT: CPT

## 2018-01-13 PROCEDURE — 85007 BL SMEAR W/DIFF WBC COUNT: CPT

## 2018-01-13 PROCEDURE — 700102 HCHG RX REV CODE 250 W/ 637 OVERRIDE(OP): Performed by: COLON & RECTAL SURGERY

## 2018-01-13 PROCEDURE — 700101 HCHG RX REV CODE 250: Performed by: COLON & RECTAL SURGERY

## 2018-01-13 PROCEDURE — 700105 HCHG RX REV CODE 258: Performed by: PHYSICIAN ASSISTANT

## 2018-01-13 PROCEDURE — 700111 HCHG RX REV CODE 636 W/ 250 OVERRIDE (IP): Performed by: PHYSICIAN ASSISTANT

## 2018-01-13 PROCEDURE — 700105 HCHG RX REV CODE 258: Performed by: COLON & RECTAL SURGERY

## 2018-01-13 RX ADMIN — HYDROMORPHONE HYDROCHLORIDE: 2 INJECTION INTRAMUSCULAR; INTRAVENOUS; SUBCUTANEOUS at 01:10

## 2018-01-13 RX ADMIN — CEFAZOLIN SODIUM 1 G: 1 INJECTION, SOLUTION INTRAVENOUS at 21:21

## 2018-01-13 RX ADMIN — HYDROMORPHONE HYDROCHLORIDE 5 MG: 2 INJECTION INTRAMUSCULAR; INTRAVENOUS; SUBCUTANEOUS at 17:23

## 2018-01-13 RX ADMIN — FAMOTIDINE 20 MG: 10 INJECTION, SOLUTION INTRAVENOUS at 21:20

## 2018-01-13 RX ADMIN — ENOXAPARIN SODIUM 30 MG: 100 INJECTION SUBCUTANEOUS at 08:10

## 2018-01-13 RX ADMIN — HYDROMORPHONE HYDROCHLORIDE 5 MG: 2 INJECTION INTRAMUSCULAR; INTRAVENOUS; SUBCUTANEOUS at 11:07

## 2018-01-13 RX ADMIN — MORPHINE SULFATE 30 MG: 30 TABLET, EXTENDED RELEASE ORAL at 21:20

## 2018-01-13 RX ADMIN — POTASSIUM CHLORIDE: 2 INJECTION, SOLUTION, CONCENTRATE INTRAVENOUS at 22:51

## 2018-01-13 RX ADMIN — CEFAZOLIN SODIUM 2 G: 2 INJECTION, SOLUTION INTRAVENOUS at 05:47

## 2018-01-13 RX ADMIN — HYDROMORPHONE HYDROCHLORIDE: 2 INJECTION INTRAMUSCULAR; INTRAVENOUS; SUBCUTANEOUS at 06:33

## 2018-01-13 RX ADMIN — CEFAZOLIN SODIUM 1 G: 1 INJECTION, SOLUTION INTRAVENOUS at 14:56

## 2018-01-13 RX ADMIN — ENOXAPARIN SODIUM 30 MG: 100 INJECTION SUBCUTANEOUS at 21:21

## 2018-01-13 RX ADMIN — IPRATROPIUM BROMIDE AND ALBUTEROL SULFATE 3 ML: .5; 3 SOLUTION RESPIRATORY (INHALATION) at 10:01

## 2018-01-13 RX ADMIN — HYDROMORPHONE HYDROCHLORIDE: 2 INJECTION INTRAMUSCULAR; INTRAVENOUS; SUBCUTANEOUS at 22:51

## 2018-01-13 RX ADMIN — FAMOTIDINE 20 MG: 10 INJECTION, SOLUTION INTRAVENOUS at 08:10

## 2018-01-13 RX ADMIN — MORPHINE SULFATE 30 MG: 30 TABLET, EXTENDED RELEASE ORAL at 08:10

## 2018-01-13 RX ADMIN — POTASSIUM CHLORIDE: 2 INJECTION, SOLUTION, CONCENTRATE INTRAVENOUS at 05:48

## 2018-01-13 RX ADMIN — LORAZEPAM 1 MG: 2 INJECTION INTRAMUSCULAR; INTRAVENOUS at 20:08

## 2018-01-13 ASSESSMENT — PAIN SCALES - GENERAL
PAINLEVEL_OUTOF10: 8
PAINLEVEL_OUTOF10: 8
PAINLEVEL_OUTOF10: 6
PAINLEVEL_OUTOF10: 4
PAINLEVEL_OUTOF10: 6
PAINLEVEL_OUTOF10: 6
PAINLEVEL_OUTOF10: 8
PAINLEVEL_OUTOF10: 5
PAINLEVEL_OUTOF10: 5
PAINLEVEL_OUTOF10: 6
PAINLEVEL_OUTOF10: 8

## 2018-01-13 ASSESSMENT — ENCOUNTER SYMPTOMS
DIARRHEA: 0
COUGH: 0
SHORTNESS OF BREATH: 0
CHILLS: 0
HEARTBURN: 0
ABDOMINAL PAIN: 1
FEVER: 0
PALPITATIONS: 0
NAUSEA: 0
VOMITING: 0

## 2018-01-13 NOTE — PROGRESS NOTES
Patient back from OR. Patient to be admitted to [facility] when bed ready. New Colostomy. Family at bedside, very anxious, patient in extreme pain, on PCA. Call placed to MD for orders for pain control.

## 2018-01-13 NOTE — CARE PLAN
Problem: Bowel/Gastric:  Goal: Normal bowel function is maintained or improved  Outcome: PROGRESSING AS EXPECTED  Patient doesn't have any flatus or stool in ostomy bag.  Patient remains on clear liquid diet until bowel function returns.      Problem: Pain Management  Goal: Pain level will decrease to patient's comfort goal  Outcome: PROGRESSING AS EXPECTED  Patient having complaints of pain 8/10, PCA in use.  MS contin given per MAR.

## 2018-01-13 NOTE — PROGRESS NOTES
Surgical Progress Note    Author: Hortencia Ha Date & Time created: 2018   9:13 AM     Interval Events:  POD#1 Diagnostic laparoscopy, creation of diverting colostomy and mucous fistula. Pt doing better this morning with dilaudid PCA. Morphine PCA not controlling pain last night, changed to Dilaudid. Pt continues to receive scheduled MS Contin BID. Opioid tolerant. Tolerating ice chips without nausea/vomiting. Admits to incisional abdominal pain and at ostomy site. Pt ambulated to the BR. Voiding without difficulty.   Review of Systems   Constitutional: Negative for chills and fever.   Respiratory: Negative for cough and shortness of breath.    Cardiovascular: Negative for chest pain and palpitations.   Gastrointestinal: Positive for abdominal pain (incisional). Negative for diarrhea, heartburn, nausea and vomiting.   Genitourinary: Negative for dysuria.   Skin: Negative for rash.     Hemodynamics:  Temp (24hrs), Av.9 °C (98.5 °F), Min:36.6 °C (97.8 °F), Max:37.7 °C (99.8 °F)  Temperature: 37.7 °C (99.8 °F)  Pulse  Av.4  Min: 72  Max: 111Heart Rate (Monitored): 84  Blood Pressure: 124/77, NIBP: 132/69     Respiratory:    Respiration: 18, Pulse Oximetry: 98 %, O2 Daily Delivery Respiratory : Room Air with O2 Available     Given By:: Mouthpiece, Work Of Breathing / Effort: Mild  RUL Breath Sounds: Clear, RML Breath Sounds: Clear, RLL Breath Sounds: Diminished, MELISSA Breath Sounds: Clear, LLL Breath Sounds: Diminished  Neuro:  GCS       Fluids:    Intake/Output Summary (Last 24 hours) at 18 0913  Last data filed at 18 0722   Gross per 24 hour   Intake             2088 ml   Output              800 ml   Net             1288 ml        Current Diet Order   Procedures   • DIET ORDER     Physical Exam   Constitutional: She is oriented to person, place, and time. She appears well-developed and well-nourished. No distress.   HENT:   Head: Normocephalic and atraumatic.   Eyes: Conjunctivae are normal.    Neck: Normal range of motion. Neck supple.   Cardiovascular: Normal rate and regular rhythm.    Tachycardic at times in 100s   Pulmonary/Chest: Effort normal. No respiratory distress.   Abdominal: Soft. She exhibits distension (mild). She exhibits no mass. There is tenderness (incisional and ostomy site). There is no rebound and no guarding.   Ostomy red, edematous, serosanguinous output in applicance   Musculoskeletal: Normal range of motion.   Neurological: She is alert and oriented to person, place, and time.   Skin: Skin is warm and dry. No rash noted. No erythema. No pallor.   Incisions C/D/I   Psychiatric: She has a normal mood and affect.     Labs:  Recent Results (from the past 24 hour(s))   CBC WITH DIFFERENTIAL    Collection Time: 01/12/18 10:45 AM   Result Value Ref Range    WBC 4.1 (L) 4.8 - 10.8 K/uL    RBC 3.73 (L) 4.20 - 5.40 M/uL    Hemoglobin 11.0 (L) 12.0 - 16.0 g/dL    Hematocrit 34.6 (L) 37.0 - 47.0 %    MCV 92.8 81.4 - 97.8 fL    MCH 29.5 27.0 - 33.0 pg    MCHC 31.8 (L) 33.6 - 35.0 g/dL    RDW 42.5 35.9 - 50.0 fL    Platelet Count 195 164 - 446 K/uL    MPV 11.2 9.0 - 12.9 fL    Nucleated RBC 0.00 /100 WBC    NRBC (Absolute) 0.00 K/uL    Neutrophils-Polys 58.80 44.00 - 72.00 %    Lymphocytes 32.50 22.00 - 41.00 %    Monocytes 6.10 0.00 - 13.40 %    Eosinophils 1.70 0.00 - 6.90 %    Basophils 0.00 0.00 - 1.80 %    Neutrophils (Absolute) 2.45 2.00 - 7.15 K/uL    Lymphs (Absolute) 1.33 1.00 - 4.80 K/uL    Monos (Absolute) 0.25 0.00 - 0.85 K/uL    Eos (Absolute) 0.07 0.00 - 0.51 K/uL    Baso (Absolute) 0.00 0.00 - 0.12 K/uL   COMP METABOLIC PANEL    Collection Time: 01/12/18 10:45 AM   Result Value Ref Range    Sodium 137 135 - 145 mmol/L    Potassium 3.3 (L) 3.6 - 5.5 mmol/L    Chloride 104 96 - 112 mmol/L    Co2 20 20 - 33 mmol/L    Anion Gap 13.0 (H) 0.0 - 11.9    Glucose 118 (H) 65 - 99 mg/dL    Bun 3 (L) 8 - 22 mg/dL    Creatinine 0.51 0.50 - 1.40 mg/dL    Calcium 8.7 8.5 - 10.5 mg/dL     AST(SGOT) 23 12 - 45 U/L    ALT(SGPT) 15 2 - 50 U/L    Alkaline Phosphatase 67 30 - 99 U/L    Total Bilirubin 0.4 0.1 - 1.5 mg/dL    Albumin 3.3 3.2 - 4.9 g/dL    Total Protein 6.4 6.0 - 8.2 g/dL    Globulin 3.1 1.9 - 3.5 g/dL    A-G Ratio 1.1 g/dL   PROTHROMBIN TIME    Collection Time: 01/12/18 10:45 AM   Result Value Ref Range    PT 14.5 12.0 - 14.6 sec    INR 1.16 (H) 0.87 - 1.13   APTT    Collection Time: 01/12/18 10:45 AM   Result Value Ref Range    APTT 29.3 24.7 - 36.0 sec   ESTIMATED GFR    Collection Time: 01/12/18 10:45 AM   Result Value Ref Range    GFR If African American >60 >60 mL/min/1.73 m 2    GFR If Non African American >60 >60 mL/min/1.73 m 2   DIFFERENTIAL MANUAL    Collection Time: 01/12/18 10:45 AM   Result Value Ref Range    Bands-Stabs 0.90 0.00 - 10.00 %    Manual Diff Status PERFORMED    PERIPHERAL SMEAR REVIEW    Collection Time: 01/12/18 10:45 AM   Result Value Ref Range    Peripheral Smear Review see below    PLATELET ESTIMATE    Collection Time: 01/12/18 10:45 AM   Result Value Ref Range    Plt Estimation Normal    MORPHOLOGY    Collection Time: 01/12/18 10:45 AM   Result Value Ref Range    RBC Morphology Normal    CBC WITH DIFFERENTIAL    Collection Time: 01/13/18  4:22 AM   Result Value Ref Range    WBC 6.3 4.8 - 10.8 K/uL    RBC 3.20 (L) 4.20 - 5.40 M/uL    Hemoglobin 9.5 (L) 12.0 - 16.0 g/dL    Hematocrit 29.0 (L) 37.0 - 47.0 %    MCV 90.6 81.4 - 97.8 fL    MCH 29.7 27.0 - 33.0 pg    MCHC 32.8 (L) 33.6 - 35.0 g/dL    RDW 41.5 35.9 - 50.0 fL    Platelet Count 172 164 - 446 K/uL    MPV 11.4 9.0 - 12.9 fL    Nucleated RBC 0.00 /100 WBC    NRBC (Absolute) 0.00 K/uL    Neutrophils-Polys 81.60 (H) 44.00 - 72.00 %    Lymphocytes 11.40 (L) 22.00 - 41.00 %    Monocytes 5.30 0.00 - 13.40 %    Eosinophils 0.00 0.00 - 6.90 %    Basophils 0.00 0.00 - 1.80 %    Neutrophils (Absolute) 5.25 2.00 - 7.15 K/uL    Lymphs (Absolute) 0.72 (L) 1.00 - 4.80 K/uL    Monos (Absolute) 0.33 0.00 - 0.85 K/uL     Eos (Absolute) 0.00 0.00 - 0.51 K/uL    Baso (Absolute) 0.00 0.00 - 0.12 K/uL   DIFFERENTIAL MANUAL    Collection Time: 01/13/18  4:22 AM   Result Value Ref Range    Bands-Stabs 1.70 0.00 - 10.00 %    Manual Diff Status PERFORMED    PERIPHERAL SMEAR REVIEW    Collection Time: 01/13/18  4:22 AM   Result Value Ref Range    Peripheral Smear Review see below    PLATELET ESTIMATE    Collection Time: 01/13/18  4:22 AM   Result Value Ref Range    Plt Estimation Normal    MORPHOLOGY    Collection Time: 01/13/18  4:22 AM   Result Value Ref Range    RBC Morphology Present     Polychromia 1+    COMP METABOLIC PANEL    Collection Time: 01/13/18  4:23 AM   Result Value Ref Range    Sodium 137 135 - 145 mmol/L    Potassium 4.0 3.6 - 5.5 mmol/L    Chloride 106 96 - 112 mmol/L    Co2 21 20 - 33 mmol/L    Anion Gap 10.0 0.0 - 11.9    Glucose 126 (H) 65 - 99 mg/dL    Bun 5 (L) 8 - 22 mg/dL    Creatinine 0.52 0.50 - 1.40 mg/dL    Calcium 8.0 (L) 8.5 - 10.5 mg/dL    AST(SGOT) 18 12 - 45 U/L    ALT(SGPT) 10 2 - 50 U/L    Alkaline Phosphatase 50 30 - 99 U/L    Total Bilirubin 0.3 0.1 - 1.5 mg/dL    Albumin 2.8 (L) 3.2 - 4.9 g/dL    Total Protein 5.0 (L) 6.0 - 8.2 g/dL    Globulin 2.2 1.9 - 3.5 g/dL    A-G Ratio 1.3 g/dL   ESTIMATED GFR    Collection Time: 01/13/18  4:23 AM   Result Value Ref Range    GFR If African American >60 >60 mL/min/1.73 m 2    GFR If Non African American >60 >60 mL/min/1.73 m 2     Medical Decision Making, by Problem:  There are no active hospital problems to display for this patient.    Plan:  POD#1 Diagnostic laparoscopy, creation of diverting colostomy and mucous fistula. Pt is alert and oriented, NAD. Breathing unlabored. Tolerating PO.  Abdomen is soft, mildly distended, tender at incision sites and at ostomy site. Incisions C/D/I. Ostomy red, edematous, serosanguinous output in appliance. VS stable, HR elevated at times in 100s, likely related to pain. Labs reviewed, Hb 9.5 ok. Encouraged ambulation and  incentive spirometry. Continue clear liquids. Continue Dilaudid PCA. Discussed with Dr. Rosen.      Quality Measures:    Reviewed items::  Labs reviewed  Junior catheter::  No Junior  DVT prophylaxis pharmacological::  Enoxaparin (Lovenox)  DVT prophylaxis - mechanical:  SCDs      Discussed patient condition with RN, Patient and Dr. Rosen

## 2018-01-13 NOTE — CARE PLAN
Problem: Safety  Goal: Will remain free from injury  Outcome: PROGRESSING AS EXPECTED  Safety precautions in place. Bed in locked/low position. 2 side rails up. Treaded socks. Call light in reach, calls appropriately. Hourly rounding practiced.    Problem: Pain Management  Goal: Pain level will decrease to patient's comfort goal  Outcome: PROGRESSING AS EXPECTED  Dilaudid PCA in use

## 2018-01-13 NOTE — PROGRESS NOTES
Bedside report received by Lali RN and care assumed for patient at 1900. Pt A&O X 4,  On 2L oxygen via NC. VSS. CMS +.  Pt calls approrpiately.   Family at bedside, family insisting this RN change pt's PCA medication from Morphine to Dilaudid, family educated on need for pharmacy to approve new PCA medication and need for this unit to be restocked with Dilaudid PCA bags. Family agitated with this RN and following this RN around unit demanding to know when the bag will be here.  Family again educated on need for it to be walked up by pharmacy.  Family also educated on importance of allowing pt to press the PCA button for herself when she feels she is in pain and for family to not press PCA button.  Pt laying down in bed, whimpering and complaining of pain 10/10, Morphine PCA currently in place and Ativan IV given prior to shift change by day shift RN. Plan of care discussed including pain management, safety, mobilization, utilizing call light appropriately.  Hourly rounding in place.  Call light and belongings at bedside and within reach.  Bed in lowest position.

## 2018-01-13 NOTE — PROGRESS NOTES
Received report from evening RN.  Assumed care of patient.  Patient A&Ox4.  C/O pain 8/10 in abdomen, PCA in use.  Ambulating to the bathroom with SBA and steady giat.  Ostomy to LLQ, edematous.  Moderate amounts of serosanguinous output.  Hgb from 11 to 9.5, clarified with PA about giving lovenox, OK to give at this time.  IS educated provided.  NO complaints of nausea or vomiting.  Tolerating clear liquid diet.  Right chest port in place.  Plan of care discussed.  Call light within reach.  Bed in lowest position.

## 2018-01-13 NOTE — OP REPORT
DATE OF SERVICE:  01/12/2018    SURGEON:  Mitch Rosen MD    ASSISTANT:  Hortencia Ha PA-C.    PREOPERATIVE DIAGNOSES:  1.  Metastatic colorectal cancer.  2.  Obstructing rectosigmoid mass.    POSTOPERATIVE DIAGNOSES:  1.  Metastatic colorectal cancer.  2.  Obstructing rectosigmoid mass.  3.  Abdominal pelvic carcinomatosis.  4.  Adhesions    OPERATIONS PERFORMED:  1.  Diagnostic laparoscopy.  2.  Mobilization of the splenic flexure.  3.  Creation of diverting colostomy and mucous fistula.  4.  Adhesiolysis    INDICATIONS FOR PROCEDURE:  The patient is a 57-year-old female with   metastatic colorectal cancer who developed increasing obstructive symptoms and   was found on imaging to have a pelvic dominant mass that was creating colonic   obstruction.  She comes today for creation of a diverting colostomy.    DETAILS OF PROCEDURE:  After an extensive informed consent, discussion and   process, the patient was brought to the operating room and placed in a supine   position on the operating table.  After induction of general anesthesia and   placement of an endotracheal tube, the abdomen was prepped and draped in usual   sterile fashion.  After administration of intravenous antibiotics, a   bladeless optical entry trocar was carefully introduced in the right abdomen   and we were able to safely develop pneumoperitoneum and entered the peritoneal   space without difficulty.  Careful examination demonstrated malignant ascites   and carcinomatosis, although this was relatively low volume with very small   scattered nodules.  Most of the intestine was free and mobile.  Adhesiolysis   was performed from some omental attachments to the anterior abdominal wall   using the LigaSure device.  Two 5 mm trocars were carefully placed for these   purposes.    The distal sigmoid colon was indeed fixed as it entered the pelvis and was   dilated.  It became less dilated as we moved proximally.  The lateral   attachments were slowly  divided, but it was clear that the mesentery of the   sigmoid colon was fairly foreshortened and likely involved with metastatic   tumor, so we moved more proximally.  The splenic flexure was then mobilized.    The attachments were divided along the white line of [place].  The splenocolic   attachments were divided with the LigaSure device.  The distal omentum was   detached in the avascular plane and gradually rerolled the colon downward from   this position.  We then further mobilized the transverse colon by dividing   the gastrocolic omental attachments with the LigaSure device.  This gave us   much more mobility and there was good freedom on the transverse mesocolon to   allow for a comfortable reach to the preoperatively sited colostomy site on   the left abdominal wall.  A blunt atraumatic grasper was placed on this   portion of the mobile colon and we then moved to the outer portion of the   procedure.  The pneumoperitoneum was allowed to escape.    The skin site that was marked preoperatively was now excised, the skin and   subcutaneous tissues were excised and the fascia was exposed and a cruciate   incision made, peritoneum was incised and the aperture dilated to 2   fingerbreadths.  We then delivered the colon out through this aperture and   confirmed the proximal and distal end.  We created a mucous fistula as well as   an everting colostomy using interrupted 3-0 chromic sutures.  The [place]   stapler was used initially divide the colon and divide the portion of the   mesentery.  We then matured the colostomy and mucous fistula with 3-0 chromic   suture circumferentially.    After final inspections, the trocars were all removed and closed with Vicryl   sutures.  Steri-Strips and sterile dressings were applied.  An ostomy   appliance was applied.    Needle, sponge and instrument counts were correct.    ESTIMATED BLOOD LOSS:  Minimal, less than 20 mL.       ____________________________________     DAMION C.  MD SHEREE STONE / NTS    DD:  01/12/2018 16:27:44  DT:  01/12/2018 16:46:25    D#:  [id number]  Job#:  [id number]    cc: ALEJANRDA GUNN MD, YARON WESTON MD, MARIO GUEVARA MD

## 2018-01-13 NOTE — CARE PLAN
Problem: Safety  Goal: Will remain free from injury  Outcome: PROGRESSING AS EXPECTED  Call light within reach, pt calls for assistance at all times.  Bed in low position and locked, upper bedside rails up, proper mobility signs placed, personal possessions within reach, treaded socks on. Hourly rounding in place.        Problem: Pain Management  Goal: Pain level will decrease to patient's comfort goal  Outcome: PROGRESSING AS EXPECTED  Pt complaining of severe pain at shift change, Pt's PCA changed from Morphine to Dilaudid, pt now resting comfortably and states pain has decreased from 10/10 to 7/10

## 2018-01-13 NOTE — PROGRESS NOTES
Patient is A&Ox4.   Reports abdominal pain, Dilaudid PCA in use.   Generalized weakness, SINGH, CMS intact, denies new onset numbness and tingling.   On 2L O2 NC, denies SOB, chest pain.   Hypoactive BS x 4. Tolerating sips of clear liquids. Denies nausea/vomiting.   Abdominal lap sites with bandaids, c/d/i. Ostomy in place to LLQ, draining serosanguinous fluid.   R port accessed.   PIV in place, running IVF.   Updated on POC. Belongings and call light within reach. All needs met at this time.     2 RN skin check performed on admit to unit. Skin intact.     Refuses BA, educated on risk to fall, verbalizes understanding and still declines. Call light in reach, calls appropriately for assistance.

## 2018-01-14 LAB
ALBUMIN SERPL BCP-MCNC: 2.8 G/DL (ref 3.2–4.9)
ALBUMIN/GLOB SERPL: 1.2 G/DL
ALP SERPL-CCNC: 45 U/L (ref 30–99)
ALT SERPL-CCNC: 8 U/L (ref 2–50)
ANION GAP SERPL CALC-SCNC: 7 MMOL/L (ref 0–11.9)
ANISOCYTOSIS BLD QL SMEAR: ABNORMAL
AST SERPL-CCNC: 13 U/L (ref 12–45)
BASOPHILS # BLD AUTO: 0 % (ref 0–1.8)
BASOPHILS # BLD: 0 K/UL (ref 0–0.12)
BILIRUB SERPL-MCNC: 0.3 MG/DL (ref 0.1–1.5)
BUN SERPL-MCNC: 4 MG/DL (ref 8–22)
CALCIUM SERPL-MCNC: 8 MG/DL (ref 8.5–10.5)
CHLORIDE SERPL-SCNC: 105 MMOL/L (ref 96–112)
CO2 SERPL-SCNC: 26 MMOL/L (ref 20–33)
CREAT SERPL-MCNC: 0.46 MG/DL (ref 0.5–1.4)
EOSINOPHIL # BLD AUTO: 0 K/UL (ref 0–0.51)
EOSINOPHIL NFR BLD: 0 % (ref 0–6.9)
ERYTHROCYTE [DISTWIDTH] IN BLOOD BY AUTOMATED COUNT: 42.5 FL (ref 35.9–50)
GLOBULIN SER CALC-MCNC: 2.4 G/DL (ref 1.9–3.5)
GLUCOSE SERPL-MCNC: 95 MG/DL (ref 65–99)
HCT VFR BLD AUTO: 27.9 % (ref 37–47)
HGB BLD-MCNC: 9.2 G/DL (ref 12–16)
LYMPHOCYTES # BLD AUTO: 1.13 K/UL (ref 1–4.8)
LYMPHOCYTES NFR BLD: 17.4 % (ref 22–41)
MANUAL DIFF BLD: NORMAL
MCH RBC QN AUTO: 30 PG (ref 27–33)
MCHC RBC AUTO-ENTMCNC: 33 G/DL (ref 33.6–35)
MCV RBC AUTO: 90.9 FL (ref 81.4–97.8)
MONOCYTES # BLD AUTO: 1.13 K/UL (ref 0–0.85)
MONOCYTES NFR BLD AUTO: 17.4 % (ref 0–13.4)
MORPHOLOGY BLD-IMP: NORMAL
NEUTROPHILS # BLD AUTO: 4.24 K/UL (ref 2–7.15)
NEUTROPHILS NFR BLD: 65.2 % (ref 44–72)
NRBC # BLD AUTO: 0 K/UL
NRBC BLD-RTO: 0 /100 WBC
PLATELET # BLD AUTO: 159 K/UL (ref 164–446)
PLATELET BLD QL SMEAR: NORMAL
PMV BLD AUTO: 11.4 FL (ref 9–12.9)
POTASSIUM SERPL-SCNC: 3.9 MMOL/L (ref 3.6–5.5)
PROT SERPL-MCNC: 5.2 G/DL (ref 6–8.2)
RBC # BLD AUTO: 3.07 M/UL (ref 4.2–5.4)
RBC BLD AUTO: PRESENT
SODIUM SERPL-SCNC: 138 MMOL/L (ref 135–145)
WBC # BLD AUTO: 6.5 K/UL (ref 4.8–10.8)

## 2018-01-14 PROCEDURE — 80053 COMPREHEN METABOLIC PANEL: CPT

## 2018-01-14 PROCEDURE — 85027 COMPLETE CBC AUTOMATED: CPT

## 2018-01-14 PROCEDURE — 770006 HCHG ROOM/CARE - MED/SURG/GYN SEMI*

## 2018-01-14 PROCEDURE — A9270 NON-COVERED ITEM OR SERVICE: HCPCS | Performed by: COLON & RECTAL SURGERY

## 2018-01-14 PROCEDURE — 700105 HCHG RX REV CODE 258: Performed by: PHYSICIAN ASSISTANT

## 2018-01-14 PROCEDURE — 85007 BL SMEAR W/DIFF WBC COUNT: CPT

## 2018-01-14 PROCEDURE — 36415 COLL VENOUS BLD VENIPUNCTURE: CPT

## 2018-01-14 PROCEDURE — 700111 HCHG RX REV CODE 636 W/ 250 OVERRIDE (IP): Performed by: PHYSICIAN ASSISTANT

## 2018-01-14 PROCEDURE — 700102 HCHG RX REV CODE 250 W/ 637 OVERRIDE(OP): Performed by: COLON & RECTAL SURGERY

## 2018-01-14 PROCEDURE — 700111 HCHG RX REV CODE 636 W/ 250 OVERRIDE (IP): Performed by: COLON & RECTAL SURGERY

## 2018-01-14 RX ORDER — FAMOTIDINE 20 MG/1
20 TABLET, FILM COATED ORAL 2 TIMES DAILY
Status: DISCONTINUED | OUTPATIENT
Start: 2018-01-15 | End: 2018-01-21 | Stop reason: HOSPADM

## 2018-01-14 RX ADMIN — MORPHINE SULFATE 30 MG: 30 TABLET, EXTENDED RELEASE ORAL at 19:59

## 2018-01-14 RX ADMIN — FAMOTIDINE 20 MG: 10 INJECTION, SOLUTION INTRAVENOUS at 21:08

## 2018-01-14 RX ADMIN — ENOXAPARIN SODIUM 30 MG: 100 INJECTION SUBCUTANEOUS at 21:08

## 2018-01-14 RX ADMIN — LORAZEPAM 1 MG: 2 INJECTION INTRAMUSCULAR; INTRAVENOUS at 18:54

## 2018-01-14 RX ADMIN — FAMOTIDINE 20 MG: 10 INJECTION, SOLUTION INTRAVENOUS at 07:47

## 2018-01-14 RX ADMIN — POTASSIUM CHLORIDE: 2 INJECTION, SOLUTION, CONCENTRATE INTRAVENOUS at 19:59

## 2018-01-14 RX ADMIN — HYDROMORPHONE HYDROCHLORIDE 5 MG: 2 INJECTION INTRAMUSCULAR; INTRAVENOUS; SUBCUTANEOUS at 19:01

## 2018-01-14 RX ADMIN — POTASSIUM CHLORIDE: 2 INJECTION, SOLUTION, CONCENTRATE INTRAVENOUS at 05:24

## 2018-01-14 RX ADMIN — CEFAZOLIN SODIUM 1 G: 1 INJECTION, SOLUTION INTRAVENOUS at 05:24

## 2018-01-14 RX ADMIN — ENOXAPARIN SODIUM 30 MG: 100 INJECTION SUBCUTANEOUS at 07:46

## 2018-01-14 RX ADMIN — MORPHINE SULFATE 30 MG: 30 TABLET, EXTENDED RELEASE ORAL at 07:47

## 2018-01-14 RX ADMIN — HYDROMORPHONE HYDROCHLORIDE 5 MG: 2 INJECTION INTRAMUSCULAR; INTRAVENOUS; SUBCUTANEOUS at 08:42

## 2018-01-14 ASSESSMENT — ENCOUNTER SYMPTOMS
COUGH: 0
ABDOMINAL PAIN: 1
FEVER: 0
SHORTNESS OF BREATH: 0
HEARTBURN: 0
CHILLS: 0
NAUSEA: 0
PALPITATIONS: 0
DIARRHEA: 0
VOMITING: 0

## 2018-01-14 ASSESSMENT — PAIN SCALES - GENERAL
PAINLEVEL_OUTOF10: 4
PAINLEVEL_OUTOF10: 8
PAINLEVEL_OUTOF10: 4
PAINLEVEL_OUTOF10: 8
PAINLEVEL_OUTOF10: 6
PAINLEVEL_OUTOF10: 9
PAINLEVEL_OUTOF10: 8
PAINLEVEL_OUTOF10: 8

## 2018-01-14 NOTE — WOUND TEAM
"[facility]  Inpatient Services  New Ostomy Management & Teaching    HPI:  Reviewed  PMH: Reviewed   SH: Reviewed    Subjective: \"That is my colon?!!\"    Objective: Agreeable, still in shock about surgery.     Ostomy type: Diverting colostomy and mucous fistula.   Stoma location: LLQ.   Stoma assessment:    Appearance: Edematous, dark red, large.    Size: 2.5\"    Protrusion: Budded greater than 1\"    Output: Moderate, serosanguinous, sanguinous during change from either bowel of MC junction.    MC jxn: Intact.    Peristomal skin: abdomen edematous, intact.      Ostomy Appliance (type and size): 4\" 2 piece appliance.     Interventions: Went over what a colostomy is with patient. Removed previous appliance, stoma has swollen so much since application that pressure had to be applied from the front of the bag to ease the bowel out of the inner ring of pouch. Cleansed peristomal skin, bleeding absorbed with towel and barrier applied with pouch already snapped on to try to keep skin clean for placement. Barrier cut at the 2.75\" line, which allowed barrier to be placed around stoma without causing pain and trauma. Bottom of pouch secured. Needs SS done. All paperwork at bedside.      Pt education: Questions and concerns addressed    Evaluation: did not go through purple folder as patient faint. She was able to look at stoma and observe ostomy care.     Plan: Ostomy nurses to continue to follow for ostomy needs and teaching     Anticipated discharge needs: Supplies, supplier information, possible HH or outpatient ostomy clinic   "

## 2018-01-14 NOTE — CARE PLAN
Problem: Bowel/Gastric:  Goal: Normal bowel function is maintained or improved  Outcome: PROGRESSING AS EXPECTED  Patient tolerating clear liquid diet.  No nausea or vomiting.  Serosanguinous output into ostomy bag.      Problem: Respiratory:  Goal: Respiratory status will improve  Outcome: PROGRESSING AS EXPECTED  Patient on 1L oxygen with O2 saturations >90% while sleeping.

## 2018-01-14 NOTE — PROGRESS NOTES
Received report from evening RN.  Assumed care of patient.  Patient A&Ox4.  C/O pain 8/10, PCA in use.  No complaints of nausea or vomiting, tolerating clear liquid diet.  LLQ ostomy in place, edematous stoma.  Serosanguinous output from ostomy.  Up with SBA.  Steady gait.  No complaints of numbness/tingling.  On room air, while awake, with O2 saturations >90%.  IS encouraged.  Educated on importance on mobilization.  Plan of care discussed.  Call light within reach.

## 2018-01-14 NOTE — PROGRESS NOTES
Surgical Progress Note    Author: Hortencia Ha Date & Time created: 2018   8:57 AM     Interval Events:  POD#2 Diagnostic laparoscopy, creation of diverting colostomy and mucous fistula. Pt feeling a little better this morning. Pain somewhat controlled with dilaudid PCA, still rating the pain at 8/10, but not pressing the button as often.  Pt continues to receive scheduled MS Contin BID. Opioid tolerant. Tolerating clears without nausea/vomiting. Admits to incisional abdominal pain and at ostomy site. Pt denies shortness of breath.  Pt ambulated the cunningham. Voiding without difficulty.   Review of Systems   Constitutional: Negative for chills and fever.   Respiratory: Negative for cough and shortness of breath.    Cardiovascular: Negative for chest pain and palpitations.   Gastrointestinal: Positive for abdominal pain (incisional and ostomy site). Negative for diarrhea, heartburn, nausea and vomiting.   Genitourinary: Negative for dysuria.   Skin: Negative for rash.     Hemodynamics:  Temp (24hrs), Av.4 °C (99.4 °F), Min:36.9 °C (98.5 °F), Max:37.7 °C (99.8 °F)  Temperature: 36.9 °C (98.5 °F)  Pulse  Av.9  Min: 72  Max: 114   Blood Pressure: 120/68     Respiratory:    Respiration: 16, Pulse Oximetry: 97 %, O2 Daily Delivery Respiratory : Silicone Nasal Cannula     Given By:: Mouthpiece, Work Of Breathing / Effort: Mild;Shallow  RUL Breath Sounds: Clear, RML Breath Sounds: Clear, RLL Breath Sounds: Diminished, MELISSA Breath Sounds: Clear, LLL Breath Sounds: Diminished  Neuro:  GCS       Fluids:    Intake/Output Summary (Last 24 hours) at 18 0857  Last data filed at 18 0735   Gross per 24 hour   Intake             2385 ml   Output             1600 ml   Net              785 ml          Current Diet Order   Procedures   • DIET ORDER     Physical Exam   Constitutional: She is oriented to person, place, and time. She appears well-developed and well-nourished. No distress.   HENT:   Head: Normocephalic  and atraumatic.   Eyes: Conjunctivae are normal.   Neck: Normal range of motion. Neck supple.   Cardiovascular: Regular rhythm.    Tachycardic in 100s-110s   Pulmonary/Chest: Effort normal. No respiratory distress.   Abdominal: Soft. She exhibits distension (mild). She exhibits no mass. There is tenderness (incisional and ostomy site). There is no rebound and no guarding.   Ostomy red, edematous, serosanguinous output in applicance   Musculoskeletal: Normal range of motion.   Neurological: She is alert and oriented to person, place, and time.   Skin: Skin is warm and dry. No rash noted. No erythema. No pallor.   Incisions C/D/I   Psychiatric: She has a normal mood and affect.     Labs:  Recent Results (from the past 24 hour(s))   CBC WITH DIFFERENTIAL    Collection Time: 01/14/18  3:46 AM   Result Value Ref Range    WBC 6.5 4.8 - 10.8 K/uL    RBC 3.07 (L) 4.20 - 5.40 M/uL    Hemoglobin 9.2 (L) 12.0 - 16.0 g/dL    Hematocrit 27.9 (L) 37.0 - 47.0 %    MCV 90.9 81.4 - 97.8 fL    MCH 30.0 27.0 - 33.0 pg    MCHC 33.0 (L) 33.6 - 35.0 g/dL    RDW 42.5 35.9 - 50.0 fL    Platelet Count 159 (L) 164 - 446 K/uL    MPV 11.4 9.0 - 12.9 fL    Nucleated RBC 0.00 /100 WBC    NRBC (Absolute) 0.00 K/uL    Neutrophils-Polys 65.20 44.00 - 72.00 %    Lymphocytes 17.40 (L) 22.00 - 41.00 %    Monocytes 17.40 (H) 0.00 - 13.40 %    Eosinophils 0.00 0.00 - 6.90 %    Basophils 0.00 0.00 - 1.80 %    Neutrophils (Absolute) 4.24 2.00 - 7.15 K/uL    Lymphs (Absolute) 1.13 1.00 - 4.80 K/uL    Monos (Absolute) 1.13 (H) 0.00 - 0.85 K/uL    Eos (Absolute) 0.00 0.00 - 0.51 K/uL    Baso (Absolute) 0.00 0.00 - 0.12 K/uL    Anisocytosis 1+    COMP METABOLIC PANEL    Collection Time: 01/14/18  3:46 AM   Result Value Ref Range    Sodium 138 135 - 145 mmol/L    Potassium 3.9 3.6 - 5.5 mmol/L    Chloride 105 96 - 112 mmol/L    Co2 26 20 - 33 mmol/L    Anion Gap 7.0 0.0 - 11.9    Glucose 95 65 - 99 mg/dL    Bun 4 (L) 8 - 22 mg/dL    Creatinine 0.46 (L) 0.50 -  1.40 mg/dL    Calcium 8.0 (L) 8.5 - 10.5 mg/dL    AST(SGOT) 13 12 - 45 U/L    ALT(SGPT) 8 2 - 50 U/L    Alkaline Phosphatase 45 30 - 99 U/L    Total Bilirubin 0.3 0.1 - 1.5 mg/dL    Albumin 2.8 (L) 3.2 - 4.9 g/dL    Total Protein 5.2 (L) 6.0 - 8.2 g/dL    Globulin 2.4 1.9 - 3.5 g/dL    A-G Ratio 1.2 g/dL   ESTIMATED GFR    Collection Time: 01/14/18  3:46 AM   Result Value Ref Range    GFR If African American >60 >60 mL/min/1.73 m 2    GFR If Non African American >60 >60 mL/min/1.73 m 2   DIFFERENTIAL MANUAL    Collection Time: 01/14/18  3:46 AM   Result Value Ref Range    Manual Diff Status PERFORMED    PERIPHERAL SMEAR REVIEW    Collection Time: 01/14/18  3:46 AM   Result Value Ref Range    Peripheral Smear Review see below    PLATELET ESTIMATE    Collection Time: 01/14/18  3:46 AM   Result Value Ref Range    Plt Estimation Decreased    MORPHOLOGY    Collection Time: 01/14/18  3:46 AM   Result Value Ref Range    RBC Morphology Present      Medical Decision Making, by Problem:  There are no active hospital problems to display for this patient.    Plan:  POD#2 Diagnostic laparoscopy, creation of diverting colostomy and mucous fistula. Pt is alert and oriented, NAD. Breathing unlabored. Tolerating PO.  Abdomen is soft, mildly distended, tender at incision sites and at ostomy site. Incisions C/D/I. Ostomy red, edematous, serosanguinous output in appliance. No gas yet. VS reviewed, Tachycardic in 100s-110s, likely related to pain. Labs reviewed, Hb 9.2 ok. Encouraged ambulation and incentive spirometry. Continue clear liquids. Continue Dilaudid PCA 1 more day. Discussed with Dr. Rosen.      Quality Measures:    Reviewed items::  Labs reviewed  Junior catheter::  No Junior  DVT prophylaxis pharmacological::  Enoxaparin (Lovenox)  DVT prophylaxis - mechanical:  SCDs      Discussed patient condition with RN, Patient and Dr. Rosen

## 2018-01-14 NOTE — CARE PLAN
Problem: Venous Thromboembolism (VTW)/Deep Vein Thrombosis (DVT) Prevention:  Goal: Patient will participate in Venous Thrombosis (VTE)/Deep Vein Thrombosis (DVT)Prevention Measures  Outcome: PROGRESSING AS EXPECTED  Lovenox SQ administered per MAR. Pt mobilizing with SBA     Problem: Pain Management  Goal: Pain level will decrease to patient's comfort goal  Outcome: PROGRESSING AS EXPECTED  Dilaudid PCA in place

## 2018-01-14 NOTE — PROGRESS NOTES
Patient is A&Ox4.   Reports pain to abdomen, managing pain with Dilaudid PCA  SINGH, CMS intact, denies numbness and tingling.   Pt mobilizes with SBA, calls appropriately, gait steady.   On 1L O2 NC (at bedtime),  > 90% when awake and on RA. Denies SOB or chest pain.   Hypoactive BS x 4. Tolerating clear liquids. Denies nausea/vomiting.   + flatus, - BM from ostomy. Ostomy with serosanguinous drainage. Pt is voiding adequately.   Abdominal lap sites with bandaids in place, c/d/i.   R port accessed. Dressing changed.   PIV running IVF  Updated on POC. Belongings and call light within reach. All needs met at this time.

## 2018-01-15 LAB
ALBUMIN SERPL BCP-MCNC: 2.7 G/DL (ref 3.2–4.9)
ALBUMIN/GLOB SERPL: 1.1 G/DL
ALP SERPL-CCNC: 45 U/L (ref 30–99)
ALT SERPL-CCNC: 7 U/L (ref 2–50)
ANION GAP SERPL CALC-SCNC: 7 MMOL/L (ref 0–11.9)
AST SERPL-CCNC: 11 U/L (ref 12–45)
BASOPHILS # BLD AUTO: 0.2 % (ref 0–1.8)
BASOPHILS # BLD: 0.01 K/UL (ref 0–0.12)
BILIRUB SERPL-MCNC: 0.6 MG/DL (ref 0.1–1.5)
BUN SERPL-MCNC: 3 MG/DL (ref 8–22)
CALCIUM SERPL-MCNC: 7.7 MG/DL (ref 8.5–10.5)
CHLORIDE SERPL-SCNC: 103 MMOL/L (ref 96–112)
CO2 SERPL-SCNC: 27 MMOL/L (ref 20–33)
CREAT SERPL-MCNC: 0.44 MG/DL (ref 0.5–1.4)
EOSINOPHIL # BLD AUTO: 0.13 K/UL (ref 0–0.51)
EOSINOPHIL NFR BLD: 2.1 % (ref 0–6.9)
ERYTHROCYTE [DISTWIDTH] IN BLOOD BY AUTOMATED COUNT: 43.4 FL (ref 35.9–50)
GLOBULIN SER CALC-MCNC: 2.5 G/DL (ref 1.9–3.5)
GLUCOSE SERPL-MCNC: 89 MG/DL (ref 65–99)
HCT VFR BLD AUTO: 26.9 % (ref 37–47)
HGB BLD-MCNC: 8.7 G/DL (ref 12–16)
IMM GRANULOCYTES # BLD AUTO: 0.06 K/UL (ref 0–0.11)
IMM GRANULOCYTES NFR BLD AUTO: 1 % (ref 0–0.9)
LYMPHOCYTES # BLD AUTO: 0.96 K/UL (ref 1–4.8)
LYMPHOCYTES NFR BLD: 15.3 % (ref 22–41)
MCH RBC QN AUTO: 29.4 PG (ref 27–33)
MCHC RBC AUTO-ENTMCNC: 32.3 G/DL (ref 33.6–35)
MCV RBC AUTO: 90.9 FL (ref 81.4–97.8)
MONOCYTES # BLD AUTO: 0.85 K/UL (ref 0–0.85)
MONOCYTES NFR BLD AUTO: 13.6 % (ref 0–13.4)
NEUTROPHILS # BLD AUTO: 4.25 K/UL (ref 2–7.15)
NEUTROPHILS NFR BLD: 67.8 % (ref 44–72)
NRBC # BLD AUTO: 0 K/UL
NRBC BLD-RTO: 0 /100 WBC
PLATELET # BLD AUTO: 189 K/UL (ref 164–446)
PMV BLD AUTO: 11.6 FL (ref 9–12.9)
POTASSIUM SERPL-SCNC: 3.6 MMOL/L (ref 3.6–5.5)
PROT SERPL-MCNC: 5.2 G/DL (ref 6–8.2)
RBC # BLD AUTO: 2.96 M/UL (ref 4.2–5.4)
SODIUM SERPL-SCNC: 137 MMOL/L (ref 135–145)
WBC # BLD AUTO: 6.3 K/UL (ref 4.8–10.8)

## 2018-01-15 PROCEDURE — 80053 COMPREHEN METABOLIC PANEL: CPT

## 2018-01-15 PROCEDURE — 700102 HCHG RX REV CODE 250 W/ 637 OVERRIDE(OP): Performed by: PHYSICIAN ASSISTANT

## 2018-01-15 PROCEDURE — 700102 HCHG RX REV CODE 250 W/ 637 OVERRIDE(OP): Performed by: PHARMACIST

## 2018-01-15 PROCEDURE — A9270 NON-COVERED ITEM OR SERVICE: HCPCS | Performed by: PHYSICIAN ASSISTANT

## 2018-01-15 PROCEDURE — 700105 HCHG RX REV CODE 258: Performed by: PHYSICIAN ASSISTANT

## 2018-01-15 PROCEDURE — 700111 HCHG RX REV CODE 636 W/ 250 OVERRIDE (IP): Performed by: PHYSICIAN ASSISTANT

## 2018-01-15 PROCEDURE — A9270 NON-COVERED ITEM OR SERVICE: HCPCS | Performed by: COLON & RECTAL SURGERY

## 2018-01-15 PROCEDURE — 700102 HCHG RX REV CODE 250 W/ 637 OVERRIDE(OP): Performed by: COLON & RECTAL SURGERY

## 2018-01-15 PROCEDURE — 700111 HCHG RX REV CODE 636 W/ 250 OVERRIDE (IP): Performed by: COLON & RECTAL SURGERY

## 2018-01-15 PROCEDURE — A9270 NON-COVERED ITEM OR SERVICE: HCPCS | Performed by: PHARMACIST

## 2018-01-15 PROCEDURE — 85025 COMPLETE CBC W/AUTO DIFF WBC: CPT

## 2018-01-15 PROCEDURE — 770006 HCHG ROOM/CARE - MED/SURG/GYN SEMI*

## 2018-01-15 RX ORDER — HYDROMORPHONE HYDROCHLORIDE 2 MG/ML
.5-1 INJECTION, SOLUTION INTRAMUSCULAR; INTRAVENOUS; SUBCUTANEOUS
Status: DISCONTINUED | OUTPATIENT
Start: 2018-01-15 | End: 2018-01-21 | Stop reason: HOSPADM

## 2018-01-15 RX ORDER — OXYCODONE HYDROCHLORIDE 5 MG/1
5 TABLET ORAL EVERY 4 HOURS PRN
Status: DISCONTINUED | OUTPATIENT
Start: 2018-01-15 | End: 2018-01-20

## 2018-01-15 RX ADMIN — HYDROMORPHONE HYDROCHLORIDE 0.5 MG: 2 INJECTION INTRAMUSCULAR; INTRAVENOUS; SUBCUTANEOUS at 12:25

## 2018-01-15 RX ADMIN — HYDROMORPHONE HYDROCHLORIDE 1 MG: 2 INJECTION INTRAMUSCULAR; INTRAVENOUS; SUBCUTANEOUS at 20:17

## 2018-01-15 RX ADMIN — OXYCODONE HYDROCHLORIDE 5 MG: 5 TABLET ORAL at 08:58

## 2018-01-15 RX ADMIN — ENOXAPARIN SODIUM 30 MG: 100 INJECTION SUBCUTANEOUS at 20:17

## 2018-01-15 RX ADMIN — OXYCODONE HYDROCHLORIDE 5 MG: 5 TABLET ORAL at 13:24

## 2018-01-15 RX ADMIN — FAMOTIDINE 20 MG: 20 TABLET, FILM COATED ORAL at 08:58

## 2018-01-15 RX ADMIN — MORPHINE SULFATE 30 MG: 30 TABLET, EXTENDED RELEASE ORAL at 08:58

## 2018-01-15 RX ADMIN — HYDROMORPHONE HYDROCHLORIDE 5 MG: 2 INJECTION INTRAMUSCULAR; INTRAVENOUS; SUBCUTANEOUS at 06:46

## 2018-01-15 RX ADMIN — ENOXAPARIN SODIUM 30 MG: 100 INJECTION SUBCUTANEOUS at 08:58

## 2018-01-15 RX ADMIN — FAMOTIDINE 20 MG: 20 TABLET, FILM COATED ORAL at 20:17

## 2018-01-15 RX ADMIN — HYDROMORPHONE HYDROCHLORIDE 1 MG: 2 INJECTION INTRAMUSCULAR; INTRAVENOUS; SUBCUTANEOUS at 16:06

## 2018-01-15 RX ADMIN — LORAZEPAM 1 MG: 2 INJECTION INTRAMUSCULAR; INTRAVENOUS at 22:38

## 2018-01-15 RX ADMIN — POTASSIUM CHLORIDE: 2 INJECTION, SOLUTION, CONCENTRATE INTRAVENOUS at 15:01

## 2018-01-15 RX ADMIN — HYDROMORPHONE HYDROCHLORIDE 1 MG: 2 INJECTION INTRAMUSCULAR; INTRAVENOUS; SUBCUTANEOUS at 22:34

## 2018-01-15 RX ADMIN — OXYCODONE HYDROCHLORIDE 5 MG: 5 TABLET ORAL at 17:27

## 2018-01-15 RX ADMIN — MORPHINE SULFATE 30 MG: 30 TABLET, EXTENDED RELEASE ORAL at 20:17

## 2018-01-15 RX ADMIN — OXYCODONE HYDROCHLORIDE 5 MG: 5 TABLET ORAL at 21:27

## 2018-01-15 ASSESSMENT — ENCOUNTER SYMPTOMS
NAUSEA: 0
FEVER: 0
DIARRHEA: 0
PALPITATIONS: 0
COUGH: 0
SHORTNESS OF BREATH: 0
VOMITING: 0
CHILLS: 0
ABDOMINAL PAIN: 1
HEARTBURN: 0

## 2018-01-15 ASSESSMENT — PAIN SCALES - GENERAL
PAINLEVEL_OUTOF10: 6
PAINLEVEL_OUTOF10: 8
PAINLEVEL_OUTOF10: 8
PAINLEVEL_OUTOF10: 7
PAINLEVEL_OUTOF10: 6
PAINLEVEL_OUTOF10: 8
PAINLEVEL_OUTOF10: 5
PAINLEVEL_OUTOF10: 6
PAINLEVEL_OUTOF10: 8

## 2018-01-15 NOTE — PROGRESS NOTES
Surgical Progress Note    Author: Hortencia Ha Date & Time created: 1/15/2018   7:43 AM     Interval Events:  POD#3 Diagnostic laparoscopy, creation of diverting colostomy and mucous fistula. Pt feeling a little better this morning. Tolerating clears without nausea/vomiting.  Pt continues to receive scheduled MS Contin BID, baseline home dose. Opioid tolerant. Admits to incisional abdominal pain and at ostomy site. Starting to pass gas from ostomy.  Pt denies shortness of breath.  Pt is ambulating the cunningham. Voiding without difficulty.   Review of Systems   Constitutional: Negative for chills and fever.   Respiratory: Negative for cough and shortness of breath.    Cardiovascular: Negative for chest pain and palpitations.   Gastrointestinal: Positive for abdominal pain (incisional and ostomy site). Negative for diarrhea, heartburn, nausea and vomiting.   Genitourinary: Negative for dysuria.   Skin: Negative for rash.     Hemodynamics:  Temp (24hrs), Av.8 °C (98.3 °F), Min:36.5 °C (97.7 °F), Max:37 °C (98.6 °F)  Temperature: 36.5 °C (97.7 °F)  Pulse  Av.6  Min: 72  Max: 117   Blood Pressure: 107/70     Respiratory:    Respiration: 16, Pulse Oximetry: 100 %        RUL Breath Sounds: Clear, RML Breath Sounds: Clear, RLL Breath Sounds: Diminished, MELISSA Breath Sounds: Clear, LLL Breath Sounds: Diminished  Neuro:  GCS       Fluids:    Intake/Output Summary (Last 24 hours) at 01/15/18 0743  Last data filed at 01/15/18 0400   Gross per 24 hour   Intake             1540 ml   Output              150 ml   Net             1390 ml            Current Diet Order   Procedures   • DIET ORDER     Physical Exam   Constitutional: She is oriented to person, place, and time. She appears well-developed and well-nourished. No distress.   HENT:   Head: Normocephalic and atraumatic.   Eyes: Conjunctivae are normal.   Neck: Normal range of motion. Neck supple.   Cardiovascular: Regular rhythm.    Tachycardic in 100s-110s    Pulmonary/Chest: Effort normal. No respiratory distress.   Abdominal: Soft. She exhibits distension (mild). She exhibits no mass. There is tenderness (incisional and ostomy site). There is no rebound and no guarding.   Ostomy red, edematous, serosanguinous output in applicance   Musculoskeletal: Normal range of motion.   Neurological: She is alert and oriented to person, place, and time.   Skin: Skin is warm and dry. No rash noted. No erythema. No pallor.   Incisions C/D/I   Psychiatric: She has a normal mood and affect.     Labs:  Recent Results (from the past 24 hour(s))   CBC WITH DIFFERENTIAL    Collection Time: 01/15/18  2:48 AM   Result Value Ref Range    WBC 6.3 4.8 - 10.8 K/uL    RBC 2.96 (L) 4.20 - 5.40 M/uL    Hemoglobin 8.7 (L) 12.0 - 16.0 g/dL    Hematocrit 26.9 (L) 37.0 - 47.0 %    MCV 90.9 81.4 - 97.8 fL    MCH 29.4 27.0 - 33.0 pg    MCHC 32.3 (L) 33.6 - 35.0 g/dL    RDW 43.4 35.9 - 50.0 fL    Platelet Count 189 164 - 446 K/uL    MPV 11.6 9.0 - 12.9 fL    Neutrophils-Polys 67.80 44.00 - 72.00 %    Lymphocytes 15.30 (L) 22.00 - 41.00 %    Monocytes 13.60 (H) 0.00 - 13.40 %    Eosinophils 2.10 0.00 - 6.90 %    Basophils 0.20 0.00 - 1.80 %    Immature Granulocytes 1.00 (H) 0.00 - 0.90 %    Nucleated RBC 0.00 /100 WBC    Neutrophils (Absolute) 4.25 2.00 - 7.15 K/uL    Lymphs (Absolute) 0.96 (L) 1.00 - 4.80 K/uL    Monos (Absolute) 0.85 0.00 - 0.85 K/uL    Eos (Absolute) 0.13 0.00 - 0.51 K/uL    Baso (Absolute) 0.01 0.00 - 0.12 K/uL    Immature Granulocytes (abs) 0.06 0.00 - 0.11 K/uL    NRBC (Absolute) 0.00 K/uL   COMP METABOLIC PANEL    Collection Time: 01/15/18  2:48 AM   Result Value Ref Range    Sodium 137 135 - 145 mmol/L    Potassium 3.6 3.6 - 5.5 mmol/L    Chloride 103 96 - 112 mmol/L    Co2 27 20 - 33 mmol/L    Anion Gap 7.0 0.0 - 11.9    Glucose 89 65 - 99 mg/dL    Bun 3 (L) 8 - 22 mg/dL    Creatinine 0.44 (L) 0.50 - 1.40 mg/dL    Calcium 7.7 (L) 8.5 - 10.5 mg/dL    AST(SGOT) 11 (L) 12 - 45 U/L     ALT(SGPT) 7 2 - 50 U/L    Alkaline Phosphatase 45 30 - 99 U/L    Total Bilirubin 0.6 0.1 - 1.5 mg/dL    Albumin 2.7 (L) 3.2 - 4.9 g/dL    Total Protein 5.2 (L) 6.0 - 8.2 g/dL    Globulin 2.5 1.9 - 3.5 g/dL    A-G Ratio 1.1 g/dL   ESTIMATED GFR    Collection Time: 01/15/18  2:48 AM   Result Value Ref Range    GFR If African American >60 >60 mL/min/1.73 m 2    GFR If Non African American >60 >60 mL/min/1.73 m 2     Medical Decision Making, by Problem:  There are no active hospital problems to display for this patient.    Plan:  POD#3 Diagnostic laparoscopy, creation of diverting colostomy and mucous fistula. Pt is alert and oriented, NAD. Breathing unlabored. Tolerating PO.  Abdomen is soft, mildly distended, tender at incision sites and at ostomy site. Incisions C/D/I. Ostomy red, edematous, serosanguinous output in appliance, +flatus.  VS reviewed, Tachycardic in 100s-110s, likely related to pain. Labs reviewed, Hb 8.7 ok. Encouraged ambulation and incentive spirometry. Advance diet to full liquids. D'c Dilaudid PCA, start orals. Await return of bowel function. Discussed with Dr. Rosen.      Quality Measures:    Reviewed items::  Labs reviewed  Junior catheter::  No Junior  DVT prophylaxis pharmacological::  Enoxaparin (Lovenox)  DVT prophylaxis - mechanical:  SCDs      Discussed patient condition with RN, Patient and Dr. Rosen

## 2018-01-15 NOTE — CARE PLAN
Problem: Bowel/Gastric:  Goal: Normal bowel function is maintained or improved  Outcome: PROGRESSING SLOWER THAN EXPECTED  Ostomy still has only flatus and serosang fluid present, no stool or other output.    Problem: Knowledge Deficit  Goal: Knowledge of disease process/condition, treatment plan, diagnostic tests, and medications will improve  Outcome: PROGRESSING AS EXPECTED  Education on poc with patient.

## 2018-01-15 NOTE — PROGRESS NOTES
Patient is A&Ox4.   Pain managed with Dilaudid PCA.   SINGH, CMS intact, denies numbness and tingling.   Mobilizes with SBA, family at bedside to assist, pt calls appropriately.   On 1L O2 NC in the evening, denies SOB, chest pain.   Hypoactive BS x 4. Tolerating clear liquids. Denies nausea/vomiting.   + flatus noted (ostomy bag was burped), serosanguinous drainage noted in bag, pt is voiding adequately.   Abdominal lap sites with bandaids, c/d/i.   PIV running IVF.   R port was accessed, pt refused to have it flushed at this time. Dressing intact and current. Will escalate in the AM for discontinuation of port as it is not being used at this time.   Updated on POC. Belongings and call light within reach. All needs met at this time.

## 2018-01-15 NOTE — CARE PLAN
"Problem: Infection  Goal: Will remain free from infection  Outcome: PROGRESSING SLOWER THAN EXPECTED  Discussed with patient regarding necessity of flushing port to ensure patency, however pt refused. Pt adamant \"my port is not working, there's a sheath that won't let blood be drawn back.\" Dressing over port intact. The line, however, is not being used. Will escalate to MD to discontinue port access to decrease chance for infection. Pt consents to [facility] baths.     Problem: Pain Management  Goal: Pain level will decrease to patient's comfort goal  Outcome: PROGRESSING AS EXPECTED  Pain managed with Dilaudid PCA      "

## 2018-01-15 NOTE — WOUND TEAM
"[facility]  Inpatient Services  New Ostomy Management & Teaching    HPI:  Reviewed  PMH: Reviewed   SH: Reviewed    Subjective: \"Should I remove my underwear?\"    Objective: appliance intact     Ostomy type: Diverting colostomy and mucous fistula   Stoma location: LLQ   Stoma assessment:    Appearance: Edematous, dark red, round    Size: 2.7\"    Protrusion: >1\"    Output: Moderate, serosanguinous, scant sanguinous    MC jxn: Intact    Peristomal skin: intact     Ostomy Appliance (type and size): 4\" two piece appliance.     Interventions: Started to remove appliance, pt then finished.  Staff cleaned cleaned stoma and distal peristomal skin, pt cleaned what she could see. Pattern made and traced to barrier, pt cut out barrier. Pt closed pouch after having it demonstrated. Barrier was applied to skin, staff started attaching pouch distally and pt finished.  Pt deciding if she wants to participate in Secure Start (SS). Paperwork @ bs. Pt aware she can decide to send in SS card herself if she wants it @ a later date.  All paperwork at bedside. \"Understanding your colostomy\" booklet has been taken home by spouse, pt refused another one.       Pt education: Questions and concerns addressed    Evaluation: Stoma with mucus and min-mod ss output. No flatus. Pt performed some of self care after it was demonstrated.     Plan: Ostomy nurses to continue to follow for ostomy needs and teaching     Anticipated discharge needs: Supplies, supplier information, possible HH or outpatient ostomy clinic   "

## 2018-01-15 NOTE — PROGRESS NOTES
Report received from RN, assumed Care. AOx4, responds appropriately.      PCA discontinued and removed.  PO meds administered- educated on po meds and pain schedule. Heat pack also given for pain.  IVF per active order through PIV.    Up standby with steady gait.  Room air at this time,  in use, sats >95%.   HR increased Hortencia Ha aware, will monitor.   +flatus in ostomy bag, small serosang output present.   Advanced to full liquid diet, small appetite, denies nausea/vomiting.  abdomen tender, lap sites approximated with no drainage.    Plan of care discussed, all questions answered.  Explained importance of calling before getting OOB and pt verbalizes understanding.     Call light and belongings within reach, treaded slipper socks on, bed in lowest locked position.  Hourly rounding in place, Will monitor frequently.

## 2018-01-15 NOTE — CARE PLAN
Problem: Nutritional:  Goal: Achieve adequate nutritional intake  Patient will start diet and consume >50% of meals   Outcome: PROGRESSING SLOWER THAN EXPECTED  Full liquid diet placed this morning and per ADLs good po of >50% x 2 meals   Intervention: Advance diet as tolerated  con't to advance diet as pt is able per MD     Intervention: Monitor PO intake, weights, and laboratory values  Please record percentage of all meals, snacks and supplements conusmed in ADLs to help monitor po adequacy      Intervention: Modify distribution, type or amount of food and nutrients within meals or snacks  Sending boost plus BID to help provide adequate nutrition     RD following

## 2018-01-16 ENCOUNTER — APPOINTMENT (OUTPATIENT)
Dept: RADIOLOGY | Facility: MEDICAL CENTER | Age: 58
DRG: 330 | End: 2018-01-16
Attending: PHYSICIAN ASSISTANT
Payer: COMMERCIAL

## 2018-01-16 LAB
ALBUMIN SERPL BCP-MCNC: 2.7 G/DL (ref 3.2–4.9)
ALBUMIN/GLOB SERPL: 1 G/DL
ALP SERPL-CCNC: 47 U/L (ref 30–99)
ALT SERPL-CCNC: 7 U/L (ref 2–50)
ANION GAP SERPL CALC-SCNC: 8 MMOL/L (ref 0–11.9)
AST SERPL-CCNC: 11 U/L (ref 12–45)
BASOPHILS # BLD AUTO: 0.2 % (ref 0–1.8)
BASOPHILS # BLD: 0.02 K/UL (ref 0–0.12)
BILIRUB SERPL-MCNC: 0.4 MG/DL (ref 0.1–1.5)
BUN SERPL-MCNC: 3 MG/DL (ref 8–22)
CALCIUM SERPL-MCNC: 7.7 MG/DL (ref 8.5–10.5)
CHLORIDE SERPL-SCNC: 103 MMOL/L (ref 96–112)
CO2 SERPL-SCNC: 25 MMOL/L (ref 20–33)
CREAT SERPL-MCNC: 0.43 MG/DL (ref 0.5–1.4)
EOSINOPHIL # BLD AUTO: 0.07 K/UL (ref 0–0.51)
EOSINOPHIL NFR BLD: 0.8 % (ref 0–6.9)
ERYTHROCYTE [DISTWIDTH] IN BLOOD BY AUTOMATED COUNT: 43.4 FL (ref 35.9–50)
GLOBULIN SER CALC-MCNC: 2.8 G/DL (ref 1.9–3.5)
GLUCOSE SERPL-MCNC: 90 MG/DL (ref 65–99)
HCT VFR BLD AUTO: 28.2 % (ref 37–47)
HGB BLD-MCNC: 9.3 G/DL (ref 12–16)
IMM GRANULOCYTES # BLD AUTO: 0.07 K/UL (ref 0–0.11)
IMM GRANULOCYTES NFR BLD AUTO: 0.8 % (ref 0–0.9)
LYMPHOCYTES # BLD AUTO: 1.16 K/UL (ref 1–4.8)
LYMPHOCYTES NFR BLD: 13.8 % (ref 22–41)
MCH RBC QN AUTO: 29.8 PG (ref 27–33)
MCHC RBC AUTO-ENTMCNC: 33 G/DL (ref 33.6–35)
MCV RBC AUTO: 90.4 FL (ref 81.4–97.8)
MONOCYTES # BLD AUTO: 0.82 K/UL (ref 0–0.85)
MONOCYTES NFR BLD AUTO: 9.7 % (ref 0–13.4)
NEUTROPHILS # BLD AUTO: 6.29 K/UL (ref 2–7.15)
NEUTROPHILS NFR BLD: 74.7 % (ref 44–72)
NRBC # BLD AUTO: 0 K/UL
NRBC BLD-RTO: 0 /100 WBC
PLATELET # BLD AUTO: 309 K/UL (ref 164–446)
PMV BLD AUTO: 11 FL (ref 9–12.9)
POTASSIUM SERPL-SCNC: 3.8 MMOL/L (ref 3.6–5.5)
PROT SERPL-MCNC: 5.5 G/DL (ref 6–8.2)
RBC # BLD AUTO: 3.12 M/UL (ref 4.2–5.4)
SODIUM SERPL-SCNC: 136 MMOL/L (ref 135–145)
WBC # BLD AUTO: 8.4 K/UL (ref 4.8–10.8)

## 2018-01-16 PROCEDURE — 700102 HCHG RX REV CODE 250 W/ 637 OVERRIDE(OP): Performed by: PHYSICIAN ASSISTANT

## 2018-01-16 PROCEDURE — A9270 NON-COVERED ITEM OR SERVICE: HCPCS | Performed by: COLON & RECTAL SURGERY

## 2018-01-16 PROCEDURE — 770006 HCHG ROOM/CARE - MED/SURG/GYN SEMI*

## 2018-01-16 PROCEDURE — 700111 HCHG RX REV CODE 636 W/ 250 OVERRIDE (IP): Performed by: PHYSICIAN ASSISTANT

## 2018-01-16 PROCEDURE — A9270 NON-COVERED ITEM OR SERVICE: HCPCS | Performed by: PHYSICIAN ASSISTANT

## 2018-01-16 PROCEDURE — 80053 COMPREHEN METABOLIC PANEL: CPT

## 2018-01-16 PROCEDURE — 700102 HCHG RX REV CODE 250 W/ 637 OVERRIDE(OP): Performed by: PHARMACIST

## 2018-01-16 PROCEDURE — 700102 HCHG RX REV CODE 250 W/ 637 OVERRIDE(OP): Performed by: COLON & RECTAL SURGERY

## 2018-01-16 PROCEDURE — 700105 HCHG RX REV CODE 258: Performed by: PHYSICIAN ASSISTANT

## 2018-01-16 PROCEDURE — 700112 HCHG RX REV CODE 229: Performed by: PHYSICIAN ASSISTANT

## 2018-01-16 PROCEDURE — 85025 COMPLETE CBC W/AUTO DIFF WBC: CPT

## 2018-01-16 PROCEDURE — 74176 CT ABD & PELVIS W/O CONTRAST: CPT

## 2018-01-16 PROCEDURE — A9270 NON-COVERED ITEM OR SERVICE: HCPCS | Performed by: PHARMACIST

## 2018-01-16 RX ORDER — DOCUSATE SODIUM 100 MG/1
100 CAPSULE, LIQUID FILLED ORAL 2 TIMES DAILY
Status: DISCONTINUED | OUTPATIENT
Start: 2018-01-16 | End: 2018-01-20

## 2018-01-16 RX ORDER — AMOXICILLIN 250 MG
1 CAPSULE ORAL DAILY
Status: DISCONTINUED | OUTPATIENT
Start: 2018-01-16 | End: 2018-01-20

## 2018-01-16 RX ORDER — POLYETHYLENE GLYCOL 3350 17 G/17G
1 POWDER, FOR SOLUTION ORAL DAILY
Status: DISCONTINUED | OUTPATIENT
Start: 2018-01-16 | End: 2018-01-21 | Stop reason: HOSPADM

## 2018-01-16 RX ORDER — SIMETHICONE 80 MG
80 TABLET,CHEWABLE ORAL 3 TIMES DAILY PRN
Status: DISCONTINUED | OUTPATIENT
Start: 2018-01-16 | End: 2018-01-21 | Stop reason: HOSPADM

## 2018-01-16 RX ORDER — ALPRAZOLAM 0.5 MG/1
0.5 TABLET ORAL 2 TIMES DAILY PRN
Status: DISCONTINUED | OUTPATIENT
Start: 2018-01-16 | End: 2018-01-21 | Stop reason: HOSPADM

## 2018-01-16 RX ADMIN — FAMOTIDINE 20 MG: 20 TABLET, FILM COATED ORAL at 08:26

## 2018-01-16 RX ADMIN — OXYCODONE HYDROCHLORIDE 5 MG: 5 TABLET ORAL at 06:50

## 2018-01-16 RX ADMIN — ENOXAPARIN SODIUM 30 MG: 100 INJECTION SUBCUTANEOUS at 08:26

## 2018-01-16 RX ADMIN — SIMETHICONE CHEW TAB 80 MG 80 MG: 80 TABLET ORAL at 15:18

## 2018-01-16 RX ADMIN — POTASSIUM CHLORIDE: 2 INJECTION, SOLUTION, CONCENTRATE INTRAVENOUS at 06:51

## 2018-01-16 RX ADMIN — FAMOTIDINE 20 MG: 20 TABLET, FILM COATED ORAL at 20:36

## 2018-01-16 RX ADMIN — MORPHINE SULFATE 30 MG: 30 TABLET, EXTENDED RELEASE ORAL at 20:36

## 2018-01-16 RX ADMIN — STANDARDIZED SENNA CONCENTRATE AND DOCUSATE SODIUM 1 TABLET: 8.6; 5 TABLET, FILM COATED ORAL at 23:07

## 2018-01-16 RX ADMIN — ENOXAPARIN SODIUM 30 MG: 100 INJECTION SUBCUTANEOUS at 20:36

## 2018-01-16 RX ADMIN — ALPRAZOLAM 0.5 MG: 0.5 TABLET ORAL at 15:18

## 2018-01-16 RX ADMIN — HYDROMORPHONE HYDROCHLORIDE 1 MG: 2 INJECTION INTRAMUSCULAR; INTRAVENOUS; SUBCUTANEOUS at 23:07

## 2018-01-16 RX ADMIN — NYSTATIN 500000 UNITS: 100000 SUSPENSION ORAL at 16:59

## 2018-01-16 RX ADMIN — HYDROMORPHONE HYDROCHLORIDE 1 MG: 2 INJECTION INTRAMUSCULAR; INTRAVENOUS; SUBCUTANEOUS at 17:53

## 2018-01-16 RX ADMIN — OXYCODONE HYDROCHLORIDE 5 MG: 5 TABLET ORAL at 12:42

## 2018-01-16 RX ADMIN — HYDROMORPHONE HYDROCHLORIDE 1 MG: 2 INJECTION INTRAMUSCULAR; INTRAVENOUS; SUBCUTANEOUS at 11:55

## 2018-01-16 RX ADMIN — DOCUSATE SODIUM 100 MG: 100 CAPSULE ORAL at 23:07

## 2018-01-16 RX ADMIN — POLYETHYLENE GLYCOL 3350 1 PACKET: 17 POWDER, FOR SOLUTION ORAL at 23:07

## 2018-01-16 RX ADMIN — HYDROMORPHONE HYDROCHLORIDE 1 MG: 2 INJECTION INTRAMUSCULAR; INTRAVENOUS; SUBCUTANEOUS at 08:54

## 2018-01-16 RX ADMIN — MORPHINE SULFATE 30 MG: 30 TABLET, EXTENDED RELEASE ORAL at 08:26

## 2018-01-16 RX ADMIN — HYDROMORPHONE HYDROCHLORIDE 1 MG: 2 INJECTION INTRAMUSCULAR; INTRAVENOUS; SUBCUTANEOUS at 14:36

## 2018-01-16 RX ADMIN — NYSTATIN 500000 UNITS: 100000 SUSPENSION ORAL at 23:07

## 2018-01-16 RX ADMIN — OXYCODONE HYDROCHLORIDE 5 MG: 5 TABLET ORAL at 19:16

## 2018-01-16 RX ADMIN — HYDROMORPHONE HYDROCHLORIDE 1 MG: 2 INJECTION INTRAMUSCULAR; INTRAVENOUS; SUBCUTANEOUS at 02:52

## 2018-01-16 RX ADMIN — HYDROMORPHONE HYDROCHLORIDE 1 MG: 2 INJECTION INTRAMUSCULAR; INTRAVENOUS; SUBCUTANEOUS at 20:36

## 2018-01-16 ASSESSMENT — ENCOUNTER SYMPTOMS
NAUSEA: 1
FEVER: 0
HEARTBURN: 0
ABDOMINAL PAIN: 1
CHILLS: 0
COUGH: 0
SHORTNESS OF BREATH: 0
CONSTIPATION: 1
DIARRHEA: 0
VOMITING: 0

## 2018-01-16 ASSESSMENT — PAIN SCALES - GENERAL
PAINLEVEL_OUTOF10: 7
PAINLEVEL_OUTOF10: 6
PAINLEVEL_OUTOF10: 8
PAINLEVEL_OUTOF10: 9
PAINLEVEL_OUTOF10: 6
PAINLEVEL_OUTOF10: 9
PAINLEVEL_OUTOF10: 8
PAINLEVEL_OUTOF10: 8
PAINLEVEL_OUTOF10: 9

## 2018-01-16 NOTE — CARE PLAN
Problem: Safety  Goal: Will remain free from falls  Outcome: PROGRESSING AS EXPECTED  Pt calls approprietly, call light within reach, treaded socks on, bed in lowest portion, pt ambulates with a steady gait, all needs met at this time, hourly rounding in place     Problem: Infection  Goal: Will remain free from infection  Outcome: PROGRESSING AS EXPECTED  Pt remains free from s/s of infection, will continue to monitor

## 2018-01-16 NOTE — WOUND TEAM
"[facility]  Inpatient Services  New Ostomy Management & Teaching    HPI:  Reviewed  PMH: Reviewed   SH: Reviewed    Subjective: \"This is my  Sascha.\"    Objective: appliance intact     Ostomy type: Diverting colostomy with mucous fistula   Stoma location: LLQ   Stoma assessment:    Appearance: Edematous, dark red, round    Size: 2.7\"    Protrusion: >1\"    Output: Moderate, serosanguinous    MC jxn: Intact    Peristomal skin: intact     Ostomy Appliance (type and size): 4\" two piece appliance     Interventions: Pt removed appliance.  She cleaned the stoma and distal peristomal skin, she used mirror to see distal edges. She traced the pattern and cut barrier. She applied the barrier to the skin, she attached pouch and closed.  Pt deciding if she wants to participate in Secure Start (SS). Paperwork @ bs.     Pt education: Questions and concerns addressed    Evaluation: Stoma with mucus and mod ss output. No flatus with change, both RN and pt report flatus present. Pt performed self care with cueing,  observed and asked questions.  Pt able to perform care, needs to try without cueing. Referral to oupt clinic.    Plan: Ostomy nurses to continue to follow for ostomy needs and teaching; need to k[employer] catalog and folder    Anticipated discharge needs: Supplies, supplier information, possible HH or outpatient ostomy clinic   "

## 2018-01-16 NOTE — PROGRESS NOTES
CT ordered d/t distention and complaints of abd pain unrelieved by dilaudid and roxicodone. Pt refusing to drink all of oral contrast, Hailey BOUDREAUX aware.

## 2018-01-16 NOTE — PROGRESS NOTES
Report received from night shift RN and care assumed at 0700.  Pt A+Ox4, c/o of pain in lower to left abd and lower back, administered 1 mg Dilaudid.   Lungs clear/diminished on 1L NC to room air depending on activity.  BS normoactive/hypoactive, unsure if passing flatus (ostomy bag appears to have some gas in it), -N/V, not tolerating liquids well d/t distention, LLQ colostomy outputting serosanguinous contents, no BM at this time, voiding well.  Right chest port in place and patent at this time, running LR with 20K at 50cc/hr.   Lap sites CDI, covered with steristrips and bandaids.   Patient ambulates steadily with SBA. Refused bed alarm.  at bedside.  Patient resting comfortably in bed and has no further questions or complaints at this time.  Call light within reach. Will check in with patient in an hour.        Vitals:    01/15/18 1920 01/15/18 2230 01/16/18 0430 01/16/18 0814   BP: 121/70 131/81 109/74 122/75   Pulse: 96 96 98 (!) 101   Resp: 18 18 18 18   Temp: 36.8 °C (98.2 °F) 36.7 °C (98 °F) 36.6 °C (97.9 °F) 36.8 °C (98.2 °F)   SpO2: 96% 96% 91% 92%   Weight:       Height:

## 2018-01-16 NOTE — PROGRESS NOTES
Surgical Progress Note    Author: Hailey Teixeira Date & Time created: 2018   9:59 AM     Interval Events:  POD#4 Diagnostic laparoscopy, creation of diverting colostomy and mucous fistula. Admits to nausea, denies vomiting. Ambulating, voiding. She denies flatus. Admits to feeling bloated and distended. Pt is alert and oriented, NAD. Breathing unlabored. Tolerating PO  liquids.  Abdomen is more firm today, distended, tender at incision sites and at ostomy site. Incisions C/D/I. Ostomy red, edematous, serosanguinous output in appliance,  No flatus.  VS reviewed, Tachycardic in 100s-110s. Labs reviewed, Hb ok. Encouraged ambulation and incentive spirometry. Back to clear liquids. D'c Dilaudid PCA, start orals. Await return of bowel function. Will discuss with Dr. Rosen. [place] [employer] covering for emergencies.  Review of Systems   Constitutional: Negative for chills and fever.   Respiratory: Negative for cough and shortness of breath.    Gastrointestinal: Positive for abdominal pain, constipation and nausea. Negative for diarrhea, heartburn and vomiting.   Skin: Negative for itching and rash.     Hemodynamics:  Temp (24hrs), Av.8 °C (98.2 °F), Min:36.6 °C (97.9 °F), Max:37.2 °C (98.9 °F)  Temperature: 36.8 °C (98.2 °F)  Pulse  Av  Min: 72  Max: 117   Blood Pressure: 122/75     Respiratory:    Respiration: 18, Pulse Oximetry: 92 %     Work Of Breathing / Effort: Mild  RUL Breath Sounds: Clear, RML Breath Sounds: Clear, RLL Breath Sounds: Diminished, MELISSA Breath Sounds: Clear, LLL Breath Sounds: Diminished  Neuro:  GCS       Fluids:    Intake/Output Summary (Last 24 hours) at 18 0959  Last data filed at 18 0800   Gross per 24 hour   Intake             1540 ml   Output              150 ml   Net             1390 ml        Current Diet Order   Procedures   • DIET ORDER     Physical Exam   Constitutional: She is oriented to person, place, and time. She appears well-developed and well-nourished. No  distress.   Cardiovascular:   Tachycardia 101   Pulmonary/Chest: Effort normal. No respiratory distress.   Abdominal: She exhibits distension. She exhibits no mass. There is tenderness. There is no rebound and no guarding.   Abdomen firm   Neurological: She is alert and oriented to person, place, and time.   Skin: Skin is warm and dry. No rash noted. She is not diaphoretic. No erythema.   Psychiatric: She has a normal mood and affect. Her behavior is normal.   Vitals reviewed.    Labs:  Recent Results (from the past 24 hour(s))   CBC WITH DIFFERENTIAL    Collection Time: 01/16/18  2:48 AM   Result Value Ref Range    WBC 8.4 4.8 - 10.8 K/uL    RBC 3.12 (L) 4.20 - 5.40 M/uL    Hemoglobin 9.3 (L) 12.0 - 16.0 g/dL    Hematocrit 28.2 (L) 37.0 - 47.0 %    MCV 90.4 81.4 - 97.8 fL    MCH 29.8 27.0 - 33.0 pg    MCHC 33.0 (L) 33.6 - 35.0 g/dL    RDW 43.4 35.9 - 50.0 fL    Platelet Count 309 164 - 446 K/uL    MPV 11.0 9.0 - 12.9 fL    Neutrophils-Polys 74.70 (H) 44.00 - 72.00 %    Lymphocytes 13.80 (L) 22.00 - 41.00 %    Monocytes 9.70 0.00 - 13.40 %    Eosinophils 0.80 0.00 - 6.90 %    Basophils 0.20 0.00 - 1.80 %    Immature Granulocytes 0.80 0.00 - 0.90 %    Nucleated RBC 0.00 /100 WBC    Neutrophils (Absolute) 6.29 2.00 - 7.15 K/uL    Lymphs (Absolute) 1.16 1.00 - 4.80 K/uL    Monos (Absolute) 0.82 0.00 - 0.85 K/uL    Eos (Absolute) 0.07 0.00 - 0.51 K/uL    Baso (Absolute) 0.02 0.00 - 0.12 K/uL    Immature Granulocytes (abs) 0.07 0.00 - 0.11 K/uL    NRBC (Absolute) 0.00 K/uL   COMP METABOLIC PANEL    Collection Time: 01/16/18  2:48 AM   Result Value Ref Range    Sodium 136 135 - 145 mmol/L    Potassium 3.8 3.6 - 5.5 mmol/L    Chloride 103 96 - 112 mmol/L    Co2 25 20 - 33 mmol/L    Anion Gap 8.0 0.0 - 11.9    Glucose 90 65 - 99 mg/dL    Bun 3 (L) 8 - 22 mg/dL    Creatinine 0.43 (L) 0.50 - 1.40 mg/dL    Calcium 7.7 (L) 8.5 - 10.5 mg/dL    AST(SGOT) 11 (L) 12 - 45 U/L    ALT(SGPT) 7 2 - 50 U/L    Alkaline Phosphatase 47 30 -  99 U/L    Total Bilirubin 0.4 0.1 - 1.5 mg/dL    Albumin 2.7 (L) 3.2 - 4.9 g/dL    Total Protein 5.5 (L) 6.0 - 8.2 g/dL    Globulin 2.8 1.9 - 3.5 g/dL    A-G Ratio 1.0 g/dL   ESTIMATED GFR    Collection Time: 01/16/18  2:48 AM   Result Value Ref Range    GFR If African American >60 >60 mL/min/1.73 m 2    GFR If Non African American >60 >60 mL/min/1.73 m 2     Medical Decision Making, by Problem:  There are no active hospital problems to display for this patient.    Plan:  POD#4 Diagnostic laparoscopy, creation of diverting colostomy and mucous fistula. Admits to nausea, denies vomiting. Ambulating, voiding. She denies flatus. Admits to feeling bloated and distended. Pt is alert and oriented, NAD. Breathing unlabored. Tolerating PO  liquids.  Abdomen is more firm today, distended, tender at incision sites and at ostomy site. Incisions C/D/I. Ostomy red, edematous, serosanguinous output in appliance,  No flatus.  VS reviewed, Tachycardic in 100s-110s. Labs reviewed, Hb ok. Encouraged ambulation and incentive spirometry. Back to clear liquids. D'c Dilaudid PCA, start orals. Await return of bowel function. Will discuss with Dr. Rosen. [place] [employer] covering for emergencies.    Quality Measures:    Reviewed items::  Labs reviewed and Medications reviewed  Junior catheter::  No Junior  DVT prophylaxis - mechanical:  SCDs  Ulcer Prophylaxis::  Yes      Discussed patient condition with Family and Patient

## 2018-01-16 NOTE — PROGRESS NOTES
Report received from RN, assumed care at 1900  Pt is AOx4, responds appropriately.    Pt rates pain at an 8/10, on a scale of 1-10, pt medicated per MAR, pt complaining of increased gas pain    Pt denies any SOB, chest pain, or new onset of numbness/tingling  Pt ambulates with a stand by assist   Pt has +flatus, + bowel sounds, - output in the LLQ ostomy except for a small amount of serosanguinous fluid   Pt has lap sites to abdomen that are approximated, no drainage noted   Pt is on RA, sating in the upper 90%  Pt has a port to right upper chest, dressing is clean, dry and intact, port is accessed and running LR w/ 20k+ @ 50 mL/hr   Pt is tolerating full liquid diet, pt denies nausea/vomiting.  Plan of care discussed, all questions answered.  Explained importance of calling before getting OOB and pt verbalizes understanding.     Call light and belongings within reach, treaded slipper socks on, bed in lowest locked position.  Q2H rounding in place, all needs met at this time, will continue to monitor.

## 2018-01-16 NOTE — CARE PLAN
Problem: Safety  Goal: Will remain free from injury  Outcome: PROGRESSING AS EXPECTED  No injuries or falls at this time. Will continue to reinforce safety precautions at this time.       Problem: Infection  Goal: Will remain free from infection  Outcome: PROGRESSING AS EXPECTED  No signs of infection at this time. Pt afebrile. Will continue to monitor for S+S of infection.

## 2018-01-17 LAB
ALBUMIN SERPL BCP-MCNC: 2.8 G/DL (ref 3.2–4.9)
ALBUMIN/GLOB SERPL: 1 G/DL
ALP SERPL-CCNC: 45 U/L (ref 30–99)
ALT SERPL-CCNC: 7 U/L (ref 2–50)
ANION GAP SERPL CALC-SCNC: 12 MMOL/L (ref 0–11.9)
AST SERPL-CCNC: 11 U/L (ref 12–45)
BASOPHILS # BLD AUTO: 0.2 % (ref 0–1.8)
BASOPHILS # BLD: 0.02 K/UL (ref 0–0.12)
BILIRUB SERPL-MCNC: 0.4 MG/DL (ref 0.1–1.5)
BUN SERPL-MCNC: 4 MG/DL (ref 8–22)
CALCIUM SERPL-MCNC: 8 MG/DL (ref 8.5–10.5)
CHLORIDE SERPL-SCNC: 103 MMOL/L (ref 96–112)
CO2 SERPL-SCNC: 20 MMOL/L (ref 20–33)
CREAT SERPL-MCNC: 0.39 MG/DL (ref 0.5–1.4)
EOSINOPHIL # BLD AUTO: 0.06 K/UL (ref 0–0.51)
EOSINOPHIL NFR BLD: 0.7 % (ref 0–6.9)
ERYTHROCYTE [DISTWIDTH] IN BLOOD BY AUTOMATED COUNT: 43.9 FL (ref 35.9–50)
GLOBULIN SER CALC-MCNC: 2.9 G/DL (ref 1.9–3.5)
GLUCOSE SERPL-MCNC: 91 MG/DL (ref 65–99)
HCT VFR BLD AUTO: 28.7 % (ref 37–47)
HGB BLD-MCNC: 9.2 G/DL (ref 12–16)
IMM GRANULOCYTES # BLD AUTO: 0.09 K/UL (ref 0–0.11)
IMM GRANULOCYTES NFR BLD AUTO: 1 % (ref 0–0.9)
LYMPHOCYTES # BLD AUTO: 0.82 K/UL (ref 1–4.8)
LYMPHOCYTES NFR BLD: 9 % (ref 22–41)
MCH RBC QN AUTO: 29.1 PG (ref 27–33)
MCHC RBC AUTO-ENTMCNC: 32.1 G/DL (ref 33.6–35)
MCV RBC AUTO: 90.8 FL (ref 81.4–97.8)
MONOCYTES # BLD AUTO: 0.86 K/UL (ref 0–0.85)
MONOCYTES NFR BLD AUTO: 9.5 % (ref 0–13.4)
NEUTROPHILS # BLD AUTO: 7.22 K/UL (ref 2–7.15)
NEUTROPHILS NFR BLD: 79.6 % (ref 44–72)
NRBC # BLD AUTO: 0 K/UL
NRBC BLD-RTO: 0 /100 WBC
PLATELET # BLD AUTO: 415 K/UL (ref 164–446)
PMV BLD AUTO: 10.6 FL (ref 9–12.9)
POTASSIUM SERPL-SCNC: 3.6 MMOL/L (ref 3.6–5.5)
PROT SERPL-MCNC: 5.7 G/DL (ref 6–8.2)
RBC # BLD AUTO: 3.16 M/UL (ref 4.2–5.4)
SODIUM SERPL-SCNC: 135 MMOL/L (ref 135–145)
WBC # BLD AUTO: 9.1 K/UL (ref 4.8–10.8)

## 2018-01-17 PROCEDURE — 700112 HCHG RX REV CODE 229: Performed by: PHYSICIAN ASSISTANT

## 2018-01-17 PROCEDURE — 770006 HCHG ROOM/CARE - MED/SURG/GYN SEMI*

## 2018-01-17 PROCEDURE — 700102 HCHG RX REV CODE 250 W/ 637 OVERRIDE(OP): Performed by: COLON & RECTAL SURGERY

## 2018-01-17 PROCEDURE — 700102 HCHG RX REV CODE 250 W/ 637 OVERRIDE(OP): Performed by: PHYSICIAN ASSISTANT

## 2018-01-17 PROCEDURE — A9270 NON-COVERED ITEM OR SERVICE: HCPCS | Performed by: PHYSICIAN ASSISTANT

## 2018-01-17 PROCEDURE — 700111 HCHG RX REV CODE 636 W/ 250 OVERRIDE (IP): Performed by: SURGERY

## 2018-01-17 PROCEDURE — 700105 HCHG RX REV CODE 258: Performed by: SURGERY

## 2018-01-17 PROCEDURE — A9270 NON-COVERED ITEM OR SERVICE: HCPCS | Performed by: COLON & RECTAL SURGERY

## 2018-01-17 PROCEDURE — A9270 NON-COVERED ITEM OR SERVICE: HCPCS | Performed by: PHARMACIST

## 2018-01-17 PROCEDURE — 700111 HCHG RX REV CODE 636 W/ 250 OVERRIDE (IP): Performed by: COLON & RECTAL SURGERY

## 2018-01-17 PROCEDURE — 700111 HCHG RX REV CODE 636 W/ 250 OVERRIDE (IP): Performed by: PHYSICIAN ASSISTANT

## 2018-01-17 PROCEDURE — 80053 COMPREHEN METABOLIC PANEL: CPT

## 2018-01-17 PROCEDURE — 700105 HCHG RX REV CODE 258: Performed by: PHYSICIAN ASSISTANT

## 2018-01-17 PROCEDURE — 85025 COMPLETE CBC W/AUTO DIFF WBC: CPT

## 2018-01-17 PROCEDURE — 700102 HCHG RX REV CODE 250 W/ 637 OVERRIDE(OP): Performed by: PHARMACIST

## 2018-01-17 RX ORDER — CALCIUM CARBONATE 500 MG/1
500 TABLET, CHEWABLE ORAL EVERY 4 HOURS PRN
Status: DISCONTINUED | OUTPATIENT
Start: 2018-01-17 | End: 2018-01-21 | Stop reason: HOSPADM

## 2018-01-17 RX ADMIN — NYSTATIN 500000 UNITS: 100000 SUSPENSION ORAL at 13:18

## 2018-01-17 RX ADMIN — OXYCODONE HYDROCHLORIDE 5 MG: 5 TABLET ORAL at 00:58

## 2018-01-17 RX ADMIN — STANDARDIZED SENNA CONCENTRATE AND DOCUSATE SODIUM 1 TABLET: 8.6; 5 TABLET, FILM COATED ORAL at 09:14

## 2018-01-17 RX ADMIN — DOCUSATE SODIUM 100 MG: 100 CAPSULE ORAL at 09:12

## 2018-01-17 RX ADMIN — POTASSIUM CHLORIDE: 2 INJECTION, SOLUTION, CONCENTRATE INTRAVENOUS at 06:24

## 2018-01-17 RX ADMIN — FAMOTIDINE 20 MG: 20 TABLET, FILM COATED ORAL at 20:16

## 2018-01-17 RX ADMIN — POTASSIUM CHLORIDE: 2 INJECTION, SOLUTION, CONCENTRATE INTRAVENOUS at 20:15

## 2018-01-17 RX ADMIN — NYSTATIN 500000 UNITS: 100000 SUSPENSION ORAL at 09:13

## 2018-01-17 RX ADMIN — MORPHINE SULFATE 30 MG: 30 TABLET, EXTENDED RELEASE ORAL at 20:15

## 2018-01-17 RX ADMIN — NYSTATIN 500000 UNITS: 100000 SUSPENSION ORAL at 18:04

## 2018-01-17 RX ADMIN — DOCUSATE SODIUM 100 MG: 100 CAPSULE ORAL at 20:15

## 2018-01-17 RX ADMIN — ANTACID TABLETS 500 MG: 500 TABLET, CHEWABLE ORAL at 16:31

## 2018-01-17 RX ADMIN — OXYCODONE HYDROCHLORIDE 5 MG: 5 TABLET ORAL at 21:43

## 2018-01-17 RX ADMIN — HYDROMORPHONE HYDROCHLORIDE 1 MG: 2 INJECTION INTRAMUSCULAR; INTRAVENOUS; SUBCUTANEOUS at 06:22

## 2018-01-17 RX ADMIN — LORAZEPAM 1 MG: 2 INJECTION INTRAMUSCULAR; INTRAVENOUS at 21:44

## 2018-01-17 RX ADMIN — ENOXAPARIN SODIUM 30 MG: 100 INJECTION SUBCUTANEOUS at 09:13

## 2018-01-17 RX ADMIN — LORAZEPAM 1 MG: 2 INJECTION INTRAMUSCULAR; INTRAVENOUS at 00:48

## 2018-01-17 RX ADMIN — ONDANSETRON 4 MG: 2 INJECTION INTRAMUSCULAR; INTRAVENOUS at 07:03

## 2018-01-17 RX ADMIN — ENOXAPARIN SODIUM 30 MG: 100 INJECTION SUBCUTANEOUS at 20:15

## 2018-01-17 RX ADMIN — FAMOTIDINE 20 MG: 20 TABLET, FILM COATED ORAL at 09:12

## 2018-01-17 RX ADMIN — ONDANSETRON 4 MG: 2 INJECTION INTRAMUSCULAR; INTRAVENOUS at 02:41

## 2018-01-17 RX ADMIN — OXYCODONE HYDROCHLORIDE 5 MG: 5 TABLET ORAL at 13:23

## 2018-01-17 RX ADMIN — MORPHINE SULFATE 30 MG: 30 TABLET, EXTENDED RELEASE ORAL at 09:12

## 2018-01-17 RX ADMIN — OXYCODONE HYDROCHLORIDE 5 MG: 5 TABLET ORAL at 17:39

## 2018-01-17 ASSESSMENT — PAIN SCALES - GENERAL
PAINLEVEL_OUTOF10: 7
PAINLEVEL_OUTOF10: 7
PAINLEVEL_OUTOF10: 8
PAINLEVEL_OUTOF10: 6
PAINLEVEL_OUTOF10: 5
PAINLEVEL_OUTOF10: 5
PAINLEVEL_OUTOF10: 6
PAINLEVEL_OUTOF10: 7
PAINLEVEL_OUTOF10: 5
PAINLEVEL_OUTOF10: 5
PAINLEVEL_OUTOF10: 6
PAINLEVEL_OUTOF10: 8
PAINLEVEL_OUTOF10: 7
PAINLEVEL_OUTOF10: 4
PAINLEVEL_OUTOF10: 5

## 2018-01-17 ASSESSMENT — ENCOUNTER SYMPTOMS
ABDOMINAL PAIN: 1
FEVER: 0
NAUSEA: 1
CHILLS: 0
HEARTBURN: 0
SHORTNESS OF BREATH: 0
COUGH: 0
VOMITING: 0
DIARRHEA: 0

## 2018-01-17 NOTE — PROGRESS NOTES
Pt passing lots gas, ostomy putting out light brown liquid instead of serosanguinous, 50 mL emptied at 0400 & 0500. Pt still experiencing nausea and lots of abdominal discomfort.

## 2018-01-17 NOTE — PROGRESS NOTES
Bedside report received.  Assessment complete.  A&O x 4. Patient calls appropriately.  Denies numbness and tingling.   Patient ambualtes with standby assist.  Patient has 6/10 pain. Medicated per MAR. Declines additional pain medication at this time.   Reports some nausea.  Surgical lap sites on abdo; new ostomy RLQ  + void, + flatus  Patient denies SOB.  Patient .  Review plan with of care with patient. Call light and personal belongings with in reach. Hourly rounding in place. All needs met at this time.

## 2018-01-17 NOTE — CARE PLAN
Problem: Safety  Goal: Will remain free from falls  Outcome: PROGRESSING AS EXPECTED  Patient remains free from falls at this time.     Problem: Knowledge Deficit  Goal: Knowledge of the prescribed therapeutic regimen will improve  Outcome: PROGRESSING AS EXPECTED  Patient verbalizes understanding of prescribed medications.     Problem: Mobility  Goal: Risk for activity intolerance will decrease  Outcome: PROGRESSING AS EXPECTED  Patient ambulated today; up to chair for meals; getting adequate rest

## 2018-01-17 NOTE — PROGRESS NOTES
Report received from RN, assumed care at 1900  Pt is AOx4, responds appropriately.    Pt rates pain at an 9/10, on a scale of 1-10, pt medicated per MAR, pt continues to have increased abdominal pain and distention  Pt denies any SOB, chest pain, or new onset of numbness/tingling  Pt ambulates with a stand by assist   Pt has +flatus, + hypoactive bowel sounds, - output in the LLQ ostomy except for a small amount of serosanguinous fluid, pt stoma is red and edematous  Pt has lap sites to abdomen that are approximated, no drainage noted, steri strips and bandaids in place over sites   Pt is on RA, sating in the upper 90%  Pt has a port to right upper chest, dressing is clean, dry and intact, port is accessed and running LR w/ 20k+ @ 50 mL/hr   Pt is tolerating clear liquid diet, pt has a little Nausea, however denies any vomiting.  Plan of care discussed, all questions answered.  Explained importance of calling before getting OOB and pt verbalizes understanding.     Call light and belongings within reach, treaded slipper socks on, bed in lowest locked position.  Q2H rounding in place, all needs met at this time, will continue to monitor.

## 2018-01-17 NOTE — PROGRESS NOTES
Pt is increasing amount of pain and discomfort. Abdomen distended, rounded and firm to touch. Pt still has no output noted in ostomy other then a small amount of serosanguinous drainage. Dr. Rosen  Paged for updates, Dr. Silas Linaresar on call and paged.

## 2018-01-17 NOTE — PROGRESS NOTES
Pt aao x 4. Pt refusing bed alarm despite being a high fall risk. Education provided on bed alarm and on importance of using call light for assistance. Pt verbalized understanding.

## 2018-01-17 NOTE — CARE PLAN
Problem: Bowel/Gastric:  Goal: Normal bowel function is maintained or improved  Outcome: PROGRESSING AS EXPECTED  Pt is passing gas, hypoactive bowel sounds, still no output in ostomy, bowel protocol started     Problem: Pain Management  Goal: Pain level will decrease to patient's comfort goal  Outcome: PROGRESSING AS EXPECTED  Pt still experiencing high levels of pain, pt medicated per MAR

## 2018-01-18 LAB
ALBUMIN SERPL BCP-MCNC: 2.7 G/DL (ref 3.2–4.9)
ALBUMIN/GLOB SERPL: 1.2 G/DL
ALP SERPL-CCNC: 35 U/L (ref 30–99)
ALT SERPL-CCNC: 5 U/L (ref 2–50)
ANION GAP SERPL CALC-SCNC: 7 MMOL/L (ref 0–11.9)
AST SERPL-CCNC: 9 U/L (ref 12–45)
BASOPHILS # BLD AUTO: 0.3 % (ref 0–1.8)
BASOPHILS # BLD: 0.01 K/UL (ref 0–0.12)
BILIRUB SERPL-MCNC: 0.4 MG/DL (ref 0.1–1.5)
BUN SERPL-MCNC: 5 MG/DL (ref 8–22)
CALCIUM SERPL-MCNC: 7.8 MG/DL (ref 8.5–10.5)
CHLORIDE SERPL-SCNC: 106 MMOL/L (ref 96–112)
CO2 SERPL-SCNC: 24 MMOL/L (ref 20–33)
CREAT SERPL-MCNC: 0.39 MG/DL (ref 0.5–1.4)
EOSINOPHIL # BLD AUTO: 0.11 K/UL (ref 0–0.51)
EOSINOPHIL NFR BLD: 2.8 % (ref 0–6.9)
ERYTHROCYTE [DISTWIDTH] IN BLOOD BY AUTOMATED COUNT: 43.6 FL (ref 35.9–50)
GLOBULIN SER CALC-MCNC: 2.2 G/DL (ref 1.9–3.5)
GLUCOSE SERPL-MCNC: 84 MG/DL (ref 65–99)
HCT VFR BLD AUTO: 33.7 % (ref 37–47)
HGB BLD-MCNC: 11.1 G/DL (ref 12–16)
IMM GRANULOCYTES # BLD AUTO: 0.02 K/UL (ref 0–0.11)
IMM GRANULOCYTES NFR BLD AUTO: 0.5 % (ref 0–0.9)
LYMPHOCYTES # BLD AUTO: 0.7 K/UL (ref 1–4.8)
LYMPHOCYTES NFR BLD: 17.9 % (ref 22–41)
MCH RBC QN AUTO: 29.2 PG (ref 27–33)
MCHC RBC AUTO-ENTMCNC: 32.9 G/DL (ref 33.6–35)
MCV RBC AUTO: 88.7 FL (ref 81.4–97.8)
MONOCYTES # BLD AUTO: 0.63 K/UL (ref 0–0.85)
MONOCYTES NFR BLD AUTO: 16.1 % (ref 0–13.4)
NEUTROPHILS # BLD AUTO: 2.45 K/UL (ref 2–7.15)
NEUTROPHILS NFR BLD: 62.4 % (ref 44–72)
NRBC # BLD AUTO: 0 K/UL
NRBC BLD-RTO: 0 /100 WBC
PLATELET # BLD AUTO: 259 K/UL (ref 164–446)
PMV BLD AUTO: 10.6 FL (ref 9–12.9)
POTASSIUM SERPL-SCNC: 3.9 MMOL/L (ref 3.6–5.5)
PROT SERPL-MCNC: 4.9 G/DL (ref 6–8.2)
RBC # BLD AUTO: 3.8 M/UL (ref 4.2–5.4)
SODIUM SERPL-SCNC: 137 MMOL/L (ref 135–145)
WBC # BLD AUTO: 3.9 K/UL (ref 4.8–10.8)

## 2018-01-18 PROCEDURE — A9270 NON-COVERED ITEM OR SERVICE: HCPCS | Performed by: COLON & RECTAL SURGERY

## 2018-01-18 PROCEDURE — 700111 HCHG RX REV CODE 636 W/ 250 OVERRIDE (IP): Performed by: PHYSICIAN ASSISTANT

## 2018-01-18 PROCEDURE — 700112 HCHG RX REV CODE 229: Performed by: PHYSICIAN ASSISTANT

## 2018-01-18 PROCEDURE — 700102 HCHG RX REV CODE 250 W/ 637 OVERRIDE(OP): Performed by: PHYSICIAN ASSISTANT

## 2018-01-18 PROCEDURE — 770006 HCHG ROOM/CARE - MED/SURG/GYN SEMI*

## 2018-01-18 PROCEDURE — A9270 NON-COVERED ITEM OR SERVICE: HCPCS | Performed by: PHYSICIAN ASSISTANT

## 2018-01-18 PROCEDURE — 700102 HCHG RX REV CODE 250 W/ 637 OVERRIDE(OP): Performed by: COLON & RECTAL SURGERY

## 2018-01-18 PROCEDURE — 700102 HCHG RX REV CODE 250 W/ 637 OVERRIDE(OP): Performed by: PHARMACIST

## 2018-01-18 PROCEDURE — 85025 COMPLETE CBC W/AUTO DIFF WBC: CPT

## 2018-01-18 PROCEDURE — A9270 NON-COVERED ITEM OR SERVICE: HCPCS | Performed by: PHARMACIST

## 2018-01-18 PROCEDURE — 80053 COMPREHEN METABOLIC PANEL: CPT

## 2018-01-18 PROCEDURE — 700111 HCHG RX REV CODE 636 W/ 250 OVERRIDE (IP): Performed by: SURGERY

## 2018-01-18 PROCEDURE — 700105 HCHG RX REV CODE 258: Performed by: SURGERY

## 2018-01-18 RX ADMIN — POTASSIUM CHLORIDE: 2 INJECTION, SOLUTION, CONCENTRATE INTRAVENOUS at 18:30

## 2018-01-18 RX ADMIN — OXYCODONE HYDROCHLORIDE 5 MG: 5 TABLET ORAL at 22:30

## 2018-01-18 RX ADMIN — NYSTATIN 500000 UNITS: 100000 SUSPENSION ORAL at 18:04

## 2018-01-18 RX ADMIN — HYDROMORPHONE HYDROCHLORIDE 1 MG: 2 INJECTION INTRAMUSCULAR; INTRAVENOUS; SUBCUTANEOUS at 02:18

## 2018-01-18 RX ADMIN — NYSTATIN 500000 UNITS: 100000 SUSPENSION ORAL at 19:56

## 2018-01-18 RX ADMIN — DOCUSATE SODIUM 100 MG: 100 CAPSULE ORAL at 09:56

## 2018-01-18 RX ADMIN — HYDROMORPHONE HYDROCHLORIDE 0.5 MG: 2 INJECTION INTRAMUSCULAR; INTRAVENOUS; SUBCUTANEOUS at 16:05

## 2018-01-18 RX ADMIN — OXYCODONE HYDROCHLORIDE 5 MG: 5 TABLET ORAL at 07:13

## 2018-01-18 RX ADMIN — FAMOTIDINE 20 MG: 20 TABLET, FILM COATED ORAL at 19:56

## 2018-01-18 RX ADMIN — ENOXAPARIN SODIUM 30 MG: 100 INJECTION SUBCUTANEOUS at 09:56

## 2018-01-18 RX ADMIN — MORPHINE SULFATE 30 MG: 30 TABLET, EXTENDED RELEASE ORAL at 19:56

## 2018-01-18 RX ADMIN — ENOXAPARIN SODIUM 30 MG: 100 INJECTION SUBCUTANEOUS at 19:56

## 2018-01-18 RX ADMIN — OXYCODONE HYDROCHLORIDE 5 MG: 5 TABLET ORAL at 18:33

## 2018-01-18 RX ADMIN — NYSTATIN 500000 UNITS: 100000 SUSPENSION ORAL at 09:56

## 2018-01-18 RX ADMIN — MORPHINE SULFATE 30 MG: 30 TABLET, EXTENDED RELEASE ORAL at 09:55

## 2018-01-18 RX ADMIN — STANDARDIZED SENNA CONCENTRATE AND DOCUSATE SODIUM 1 TABLET: 8.6; 5 TABLET, FILM COATED ORAL at 09:55

## 2018-01-18 RX ADMIN — ALPRAZOLAM 0.5 MG: 0.5 TABLET ORAL at 16:09

## 2018-01-18 RX ADMIN — FAMOTIDINE 20 MG: 20 TABLET, FILM COATED ORAL at 09:55

## 2018-01-18 RX ADMIN — HYDROMORPHONE HYDROCHLORIDE 0.5 MG: 2 INJECTION INTRAMUSCULAR; INTRAVENOUS; SUBCUTANEOUS at 10:14

## 2018-01-18 RX ADMIN — OXYCODONE HYDROCHLORIDE 5 MG: 5 TABLET ORAL at 12:52

## 2018-01-18 RX ADMIN — NYSTATIN 500000 UNITS: 100000 SUSPENSION ORAL at 12:52

## 2018-01-18 RX ADMIN — HYDROMORPHONE HYDROCHLORIDE 0.5 MG: 2 INJECTION INTRAMUSCULAR; INTRAVENOUS; SUBCUTANEOUS at 20:02

## 2018-01-18 RX ADMIN — HYDROMORPHONE HYDROCHLORIDE 0.5 MG: 2 INJECTION INTRAMUSCULAR; INTRAVENOUS; SUBCUTANEOUS at 23:51

## 2018-01-18 RX ADMIN — DOCUSATE SODIUM 100 MG: 100 CAPSULE ORAL at 19:56

## 2018-01-18 ASSESSMENT — LIFESTYLE VARIABLES: DO YOU DRINK ALCOHOL: NO

## 2018-01-18 ASSESSMENT — PAIN SCALES - GENERAL
PAINLEVEL_OUTOF10: 7
PAINLEVEL_OUTOF10: 8
PAINLEVEL_OUTOF10: 4
PAINLEVEL_OUTOF10: 8
PAINLEVEL_OUTOF10: 7
PAINLEVEL_OUTOF10: 8
PAINLEVEL_OUTOF10: 4
PAINLEVEL_OUTOF10: 7
PAINLEVEL_OUTOF10: 5
PAINLEVEL_OUTOF10: 6
PAINLEVEL_OUTOF10: 6

## 2018-01-18 ASSESSMENT — ENCOUNTER SYMPTOMS
ABDOMINAL PAIN: 1
NAUSEA: 1
DIARRHEA: 0
SHORTNESS OF BREATH: 0
CHILLS: 0
HEARTBURN: 0
COUGH: 0
FEVER: 0
VOMITING: 0

## 2018-01-18 NOTE — PROGRESS NOTES
Surgical Progress Note    Author: Hailey Teixeira Date & Time created: 2018   8:54 AM     Interval Events:  POD#6 Diagnostic laparoscopy, creation of diverting colostomy and mucous fistula. Admits to nausea, denies vomiting. Ambulating, voiding. Admits to some gas in bag. Admits to feeling less bloated and distended since yesterday. Pt is alert and oriented, NAD. Breathing unlabored. Tolerating PO  liquids. Abdomen is soft today,  Mildly distended, tender at incision sites and at ostomy site. Incisions C/D/I. Ostomy red, edematous, some gas and stool in appliance. CT from earlier this week, shows large amount of stool, cannot rule out obstruction, pleural effusion and consolidation vs atelectasis.  VS reviewed, Tachycardic in 100s-110s, currently 95. Labs reviewed, WBC normal, Hb ok.   Review of Systems   Constitutional: Negative for chills and fever.   Respiratory: Negative for cough and shortness of breath.    Gastrointestinal: Positive for abdominal pain and nausea. Negative for diarrhea, heartburn and vomiting.   Skin: Negative for itching and rash.     Hemodynamics:  Temp (24hrs), Av.8 °C (98.3 °F), Min:36.6 °C (97.8 °F), Max:37.1 °C (98.8 °F)  Temperature: 37.1 °C (98.8 °F)  Pulse  Av.8  Min: 72  Max: 122   Blood Pressure: (!) 97/62 (RN notified)     Respiratory:    Respiration: 16, Pulse Oximetry: 95 %     Work Of Breathing / Effort: Mild  RUL Breath Sounds: Clear, RML Breath Sounds: Clear, RLL Breath Sounds: Diminished, MELISSA Breath Sounds: Clear, LLL Breath Sounds: Diminished  Neuro:  GCS       Fluids:    Intake/Output Summary (Last 24 hours) at 18 0854  Last data filed at 18 0400   Gross per 24 hour   Intake             2940 ml   Output             1400 ml   Net             1540 ml        Current Diet Order   Procedures   • DIET ORDER     Physical Exam   Constitutional: She is oriented to person, place, and time. She appears well-developed and well-nourished. No distress.    Cardiovascular: Normal rate.    Pulmonary/Chest: Effort normal. No respiratory distress.   Abdominal: Soft. She exhibits distension. She exhibits no mass. There is tenderness. There is no rebound and no guarding.   Neurological: She is alert and oriented to person, place, and time.   Skin: Skin is warm and dry. No rash noted. She is not diaphoretic. No erythema.   Psychiatric: She has a normal mood and affect. Her behavior is normal.   Vitals reviewed.    Labs:  Recent Results (from the past 24 hour(s))   CBC WITH DIFFERENTIAL    Collection Time: 01/18/18  2:22 AM   Result Value Ref Range    WBC 3.9 (L) 4.8 - 10.8 K/uL    RBC 3.80 (L) 4.20 - 5.40 M/uL    Hemoglobin 11.1 (L) 12.0 - 16.0 g/dL    Hematocrit 33.7 (L) 37.0 - 47.0 %    MCV 88.7 81.4 - 97.8 fL    MCH 29.2 27.0 - 33.0 pg    MCHC 32.9 (L) 33.6 - 35.0 g/dL    RDW 43.6 35.9 - 50.0 fL    Platelet Count 259 164 - 446 K/uL    MPV 10.6 9.0 - 12.9 fL    Neutrophils-Polys 62.40 44.00 - 72.00 %    Lymphocytes 17.90 (L) 22.00 - 41.00 %    Monocytes 16.10 (H) 0.00 - 13.40 %    Eosinophils 2.80 0.00 - 6.90 %    Basophils 0.30 0.00 - 1.80 %    Immature Granulocytes 0.50 0.00 - 0.90 %    Nucleated RBC 0.00 /100 WBC    Neutrophils (Absolute) 2.45 2.00 - 7.15 K/uL    Lymphs (Absolute) 0.70 (L) 1.00 - 4.80 K/uL    Monos (Absolute) 0.63 0.00 - 0.85 K/uL    Eos (Absolute) 0.11 0.00 - 0.51 K/uL    Baso (Absolute) 0.01 0.00 - 0.12 K/uL    Immature Granulocytes (abs) 0.02 0.00 - 0.11 K/uL    NRBC (Absolute) 0.00 K/uL   COMP METABOLIC PANEL    Collection Time: 01/18/18  2:22 AM   Result Value Ref Range    Sodium 137 135 - 145 mmol/L    Potassium 3.9 3.6 - 5.5 mmol/L    Chloride 106 96 - 112 mmol/L    Co2 24 20 - 33 mmol/L    Anion Gap 7.0 0.0 - 11.9    Glucose 84 65 - 99 mg/dL    Bun 5 (L) 8 - 22 mg/dL    Creatinine 0.39 (L) 0.50 - 1.40 mg/dL    Calcium 7.8 (L) 8.5 - 10.5 mg/dL    AST(SGOT) 9 (L) 12 - 45 U/L    ALT(SGPT) 5 2 - 50 U/L    Alkaline Phosphatase 35 30 - 99 U/L     Total Bilirubin 0.4 0.1 - 1.5 mg/dL    Albumin 2.7 (L) 3.2 - 4.9 g/dL    Total Protein 4.9 (L) 6.0 - 8.2 g/dL    Globulin 2.2 1.9 - 3.5 g/dL    A-G Ratio 1.2 g/dL   ESTIMATED GFR    Collection Time: 01/18/18  2:22 AM   Result Value Ref Range    GFR If African American >60 >60 mL/min/1.73 m 2    GFR If Non African American >60 >60 mL/min/1.73 m 2     Medical Decision Making, by Problem:  There are no active hospital problems to display for this patient.    Plan:  POD#6 Diagnostic laparoscopy, creation of diverting colostomy and mucous fistula. Admits to nausea, denies vomiting. Ambulating, voiding. Admits to some gas in bag. Admits to feeling less bloated and distended since yesterday. Pt is alert and oriented, NAD. Breathing unlabored. Tolerating PO  liquids. Abdomen is soft today,  Mildly distended, tender at incision sites and at ostomy site. Incisions C/D/I. Ostomy red, edematous, some gas and stool in appliance. CT from earlier this week, shows large amount of stool, cannot rule out obstruction, pleural effusion and consolidation vs atelectasis.  VS reviewed, Tachycardic in 100s-110s, currently 95. Labs reviewed, WBC normal, Hb ok. Encouraged ambulation and incentive spirometry. Advance to liquids. Await return of bowel function. Will discuss with Dr. Rosen. [facility] covering for emergencies.    Quality Measures:    Reviewed items::  Labs reviewed and Medications reviewed  Junior catheter::  No Junior  DVT prophylaxis pharmacological::  Enoxaparin (Lovenox)  DVT prophylaxis - mechanical:  SCDs  Ulcer Prophylaxis::  Yes      Discussed patient condition with Patient and Dr. Rosen.

## 2018-01-18 NOTE — WOUND TEAM
"[facility]  Inpatient Services  New Ostomy Management & Teaching    HPI:  Reviewed  PMH: Reviewed   SH: Reviewed    Subjective: \"I have been emptying it.\"    Objective: Pale, fatigued, agreeable.      Ostomy type: Diverting colostomy and mucous fistula.   Stoma location: LLQ.   Stoma assessment:    Appearance: Edematous, red, some scattered slough.    Size: 2.7\"    Protrusion: Budded greater than 1\"    Output: Large, brown, soft/watery, flatus.    MC jxn: Intact.    Peristomal skin: abdomen distended, intact.      Ostomy Appliance (type and size): 4\" 2 piece appliance.     Interventions: Patient removed previous appliance with minimal help from staff, stoma has decreased in size.  Patient cleansed peristomal skin. Patient used template in ostomy supply bag to trace and cut out barrier, applied barrier around stoma and adhered the edges down. Snapped on pouch and closed the end of pouch with no assistance. May be able to place a 2.75\" appliance on patient at next visit. Bottom of pouch secured. All paperwork at bedside.      Pt education: Questions and concerns addressed    Evaluation: Patient performed 90% of ostomy care today.    Plan: Ostomy nurses to continue to follow for ostomy needs and teaching     Anticipated discharge needs: Supplies, supplier information, possible HH or outpatient ostomy clinic   "

## 2018-01-18 NOTE — PROGRESS NOTES
Report received from RN, assumed care at 1900  Pt is AOx4, responds appropriately.    Pt rates pain at an 5/10, on a scale of 1-10, pt denies pain medication needs at this time  Pt denies any SOB, chest pain, or new onset of numbness/tingling  Pt ambulates with a stand by assist   Pt has +flatus, + hypoactive bowel sounds, + output in the LLQ ostomy a moderate amount of brown liquid, pt stoma is still red and edematous, pt doing self care on the ostomy  Pt has lap sites to abdomen that are approximated, no drainage noted, steri strips and bandaids in place over sites   Pt is on RA, sating in the >95%  Pt has a port to right upper chest, dressing is clean, dry and intact, port is accessed and running LR w/ 20k+ @ 50 mL/hr   Pt is tolerating a minimal amount of her clear liquid diet, pt denies any N/V  Plan of care discussed, all questions answered.  Explained importance of calling before getting OOB and pt verbalizes understanding.     Call light and belongings within reach, treaded slipper socks on, bed in lowest locked position.  Q2H rounding in place, all needs met at this time, will continue to monitor.

## 2018-01-18 NOTE — DIETARY
Nutrition Services: Update     Pt is on day 8 of admit.  Her diet was just advanced to full liquid diet today after being on a clear liquid diet for a few days.  Since her diet was advanced she is now receiving boost plus TID between meals and high protein milkshakes TID w/meals to help provide adequate nutrition.     RD will con't to monitor for po adequacy

## 2018-01-18 NOTE — CARE PLAN
Problem: Communication  Goal: The ability to communicate needs accurately and effectively will improve  Outcome: PROGRESSING AS EXPECTED  Communicates needs effectively  Calls appropriately  Call light and personal belongings in reach    Problem: Safety  Goal: Will remain free from falls  Outcome: PROGRESSING AS EXPECTED  Patient remains free from falls at this time. Call light and personal belongings in reach.    Problem: Knowledge Deficit  Goal: Knowledge of the prescribed therapeutic regimen will improve  Outcome: PROGRESSING AS EXPECTED  Patient reports understanding of prescribed medications.     Problem: Psychosocial Needs:  Goal: Level of anxiety will decrease  Outcome: PROGRESSING AS EXPECTED

## 2018-01-18 NOTE — CARE PLAN
Problem: Communication  Goal: The ability to communicate needs accurately and effectively will improve  Outcome: PROGRESSING AS EXPECTED  Pt able to communicate needs effectively and in a timley manner, pt updates on POC, all questions answered at this time     Problem: Infection  Goal: Will remain free from infection  Outcome: PROGRESSING AS EXPECTED  Pt remains free from s/s of infection, will continue to monitor     Problem: Mobility  Goal: Risk for activity intolerance will decrease  Outcome: PROGRESSING AS EXPECTED  Pt ambulates with a stand by assist, pt gait steady, pt ambulates halls frequently

## 2018-01-19 ENCOUNTER — HOME HEALTH ADMISSION (OUTPATIENT)
Dept: HOME HEALTH SERVICES | Facility: HOME HEALTHCARE | Age: 58
End: 2018-01-19
Payer: COMMERCIAL

## 2018-01-19 PROBLEM — Z43.3 COLOSTOMY CARE (HCC): Status: ACTIVE | Noted: 2018-01-19

## 2018-01-19 LAB
ABO GROUP BLD: NORMAL
ALBUMIN SERPL BCP-MCNC: 2.4 G/DL (ref 3.2–4.9)
ALBUMIN/GLOB SERPL: 0.8 G/DL
ALP SERPL-CCNC: 43 U/L (ref 30–99)
ALT SERPL-CCNC: 8 U/L (ref 2–50)
ANION GAP SERPL CALC-SCNC: 6 MMOL/L (ref 0–11.9)
AST SERPL-CCNC: 11 U/L (ref 12–45)
BASOPHILS # BLD AUTO: 0.2 % (ref 0–1.8)
BASOPHILS # BLD AUTO: 0.3 % (ref 0–1.8)
BASOPHILS # BLD: 0.01 K/UL (ref 0–0.12)
BASOPHILS # BLD: 0.02 K/UL (ref 0–0.12)
BILIRUB SERPL-MCNC: 0.3 MG/DL (ref 0.1–1.5)
BLD GP AB SCN SERPL QL: NORMAL
BUN SERPL-MCNC: <3 MG/DL (ref 8–22)
CALCIUM SERPL-MCNC: 7.9 MG/DL (ref 8.5–10.5)
CHLORIDE SERPL-SCNC: 104 MMOL/L (ref 96–112)
CO2 SERPL-SCNC: 27 MMOL/L (ref 20–33)
CREAT SERPL-MCNC: 0.37 MG/DL (ref 0.5–1.4)
EOSINOPHIL # BLD AUTO: 0.16 K/UL (ref 0–0.51)
EOSINOPHIL # BLD AUTO: 0.2 K/UL (ref 0–0.51)
EOSINOPHIL NFR BLD: 2.6 % (ref 0–6.9)
EOSINOPHIL NFR BLD: 2.7 % (ref 0–6.9)
ERYTHROCYTE [DISTWIDTH] IN BLOOD BY AUTOMATED COUNT: 43.8 FL (ref 35.9–50)
ERYTHROCYTE [DISTWIDTH] IN BLOOD BY AUTOMATED COUNT: 44.3 FL (ref 35.9–50)
ERYTHROCYTE [DISTWIDTH] IN BLOOD BY AUTOMATED COUNT: 44.4 FL (ref 35.9–50)
GLOBULIN SER CALC-MCNC: 2.9 G/DL (ref 1.9–3.5)
GLUCOSE SERPL-MCNC: 84 MG/DL (ref 65–99)
HCT VFR BLD AUTO: 23.3 % (ref 37–47)
HCT VFR BLD AUTO: 23.6 % (ref 37–47)
HCT VFR BLD AUTO: 27.6 % (ref 37–47)
HGB BLD-MCNC: 7.6 G/DL (ref 12–16)
HGB BLD-MCNC: 7.7 G/DL (ref 12–16)
HGB BLD-MCNC: 8.9 G/DL (ref 12–16)
IMM GRANULOCYTES # BLD AUTO: 0.03 K/UL (ref 0–0.11)
IMM GRANULOCYTES # BLD AUTO: 0.04 K/UL (ref 0–0.11)
IMM GRANULOCYTES NFR BLD AUTO: 0.4 % (ref 0–0.9)
IMM GRANULOCYTES NFR BLD AUTO: 0.7 % (ref 0–0.9)
LYMPHOCYTES # BLD AUTO: 0.92 K/UL (ref 1–4.8)
LYMPHOCYTES # BLD AUTO: 1.32 K/UL (ref 1–4.8)
LYMPHOCYTES NFR BLD: 15 % (ref 22–41)
LYMPHOCYTES NFR BLD: 17.7 % (ref 22–41)
MCH RBC QN AUTO: 29 PG (ref 27–33)
MCH RBC QN AUTO: 29.2 PG (ref 27–33)
MCH RBC QN AUTO: 30.1 PG (ref 27–33)
MCHC RBC AUTO-ENTMCNC: 32.2 G/DL (ref 33.6–35)
MCHC RBC AUTO-ENTMCNC: 32.2 G/DL (ref 33.6–35)
MCHC RBC AUTO-ENTMCNC: 33 G/DL (ref 33.6–35)
MCV RBC AUTO: 90.1 FL (ref 81.4–97.8)
MCV RBC AUTO: 90.5 FL (ref 81.4–97.8)
MCV RBC AUTO: 91 FL (ref 81.4–97.8)
MONOCYTES # BLD AUTO: 0.56 K/UL (ref 0–0.85)
MONOCYTES # BLD AUTO: 0.83 K/UL (ref 0–0.85)
MONOCYTES NFR BLD AUTO: 11.2 % (ref 0–13.4)
MONOCYTES NFR BLD AUTO: 9.2 % (ref 0–13.4)
NEUTROPHILS # BLD AUTO: 4.43 K/UL (ref 2–7.15)
NEUTROPHILS # BLD AUTO: 5.04 K/UL (ref 2–7.15)
NEUTROPHILS NFR BLD: 67.7 % (ref 44–72)
NEUTROPHILS NFR BLD: 72.3 % (ref 44–72)
NRBC # BLD AUTO: 0 K/UL
NRBC # BLD AUTO: 0 K/UL
NRBC BLD-RTO: 0 /100 WBC
NRBC BLD-RTO: 0 /100 WBC
PLATELET # BLD AUTO: 312 K/UL (ref 164–446)
PLATELET # BLD AUTO: 341 K/UL (ref 164–446)
PLATELET # BLD AUTO: 364 K/UL (ref 164–446)
PMV BLD AUTO: 10.4 FL (ref 9–12.9)
PMV BLD AUTO: 10.5 FL (ref 9–12.9)
PMV BLD AUTO: 10.5 FL (ref 9–12.9)
POTASSIUM SERPL-SCNC: 3.5 MMOL/L (ref 3.6–5.5)
PROT SERPL-MCNC: 5.3 G/DL (ref 6–8.2)
RBC # BLD AUTO: 2.56 M/UL (ref 4.2–5.4)
RBC # BLD AUTO: 2.62 M/UL (ref 4.2–5.4)
RBC # BLD AUTO: 3.05 M/UL (ref 4.2–5.4)
RH BLD: NORMAL
SODIUM SERPL-SCNC: 137 MMOL/L (ref 135–145)
WBC # BLD AUTO: 6.1 K/UL (ref 4.8–10.8)
WBC # BLD AUTO: 7.4 K/UL (ref 4.8–10.8)
WBC # BLD AUTO: 7.4 K/UL (ref 4.8–10.8)

## 2018-01-19 PROCEDURE — 700111 HCHG RX REV CODE 636 W/ 250 OVERRIDE (IP): Performed by: SURGERY

## 2018-01-19 PROCEDURE — 700112 HCHG RX REV CODE 229: Performed by: PHYSICIAN ASSISTANT

## 2018-01-19 PROCEDURE — 700102 HCHG RX REV CODE 250 W/ 637 OVERRIDE(OP): Performed by: COLON & RECTAL SURGERY

## 2018-01-19 PROCEDURE — A9270 NON-COVERED ITEM OR SERVICE: HCPCS | Performed by: PHYSICIAN ASSISTANT

## 2018-01-19 PROCEDURE — 85027 COMPLETE CBC AUTOMATED: CPT

## 2018-01-19 PROCEDURE — 700102 HCHG RX REV CODE 250 W/ 637 OVERRIDE(OP): Performed by: PHARMACIST

## 2018-01-19 PROCEDURE — 86901 BLOOD TYPING SEROLOGIC RH(D): CPT

## 2018-01-19 PROCEDURE — 770006 HCHG ROOM/CARE - MED/SURG/GYN SEMI*

## 2018-01-19 PROCEDURE — A9270 NON-COVERED ITEM OR SERVICE: HCPCS | Performed by: COLON & RECTAL SURGERY

## 2018-01-19 PROCEDURE — 85025 COMPLETE CBC W/AUTO DIFF WBC: CPT

## 2018-01-19 PROCEDURE — A9270 NON-COVERED ITEM OR SERVICE: HCPCS | Performed by: PHARMACIST

## 2018-01-19 PROCEDURE — 80053 COMPREHEN METABOLIC PANEL: CPT

## 2018-01-19 PROCEDURE — 700102 HCHG RX REV CODE 250 W/ 637 OVERRIDE(OP): Performed by: PHYSICIAN ASSISTANT

## 2018-01-19 PROCEDURE — 86850 RBC ANTIBODY SCREEN: CPT

## 2018-01-19 PROCEDURE — 86900 BLOOD TYPING SEROLOGIC ABO: CPT

## 2018-01-19 PROCEDURE — 700111 HCHG RX REV CODE 636 W/ 250 OVERRIDE (IP): Performed by: COLON & RECTAL SURGERY

## 2018-01-19 PROCEDURE — 700105 HCHG RX REV CODE 258: Performed by: SURGERY

## 2018-01-19 PROCEDURE — 306580 SET INFUSION,POWERLOC 20G X.75: Performed by: COLON & RECTAL SURGERY

## 2018-01-19 PROCEDURE — 700111 HCHG RX REV CODE 636 W/ 250 OVERRIDE (IP): Performed by: PHYSICIAN ASSISTANT

## 2018-01-19 RX ADMIN — NYSTATIN 500000 UNITS: 100000 SUSPENSION ORAL at 08:45

## 2018-01-19 RX ADMIN — DOCUSATE SODIUM 100 MG: 100 CAPSULE ORAL at 20:40

## 2018-01-19 RX ADMIN — MORPHINE SULFATE 30 MG: 30 TABLET, EXTENDED RELEASE ORAL at 08:44

## 2018-01-19 RX ADMIN — HYDROMORPHONE HYDROCHLORIDE 1 MG: 2 INJECTION INTRAMUSCULAR; INTRAVENOUS; SUBCUTANEOUS at 17:34

## 2018-01-19 RX ADMIN — HYDROMORPHONE HYDROCHLORIDE 0.5 MG: 2 INJECTION INTRAMUSCULAR; INTRAVENOUS; SUBCUTANEOUS at 15:13

## 2018-01-19 RX ADMIN — DOCUSATE SODIUM 100 MG: 100 CAPSULE ORAL at 08:44

## 2018-01-19 RX ADMIN — MORPHINE SULFATE 30 MG: 30 TABLET, EXTENDED RELEASE ORAL at 20:40

## 2018-01-19 RX ADMIN — POTASSIUM CHLORIDE: 2 INJECTION, SOLUTION, CONCENTRATE INTRAVENOUS at 05:24

## 2018-01-19 RX ADMIN — SIMETHICONE CHEW TAB 80 MG 80 MG: 80 TABLET ORAL at 16:58

## 2018-01-19 RX ADMIN — OXYCODONE HYDROCHLORIDE 5 MG: 5 TABLET ORAL at 06:34

## 2018-01-19 RX ADMIN — STANDARDIZED SENNA CONCENTRATE AND DOCUSATE SODIUM 1 TABLET: 8.6; 5 TABLET, FILM COATED ORAL at 08:44

## 2018-01-19 RX ADMIN — HYDROMORPHONE HYDROCHLORIDE 1 MG: 2 INJECTION INTRAMUSCULAR; INTRAVENOUS; SUBCUTANEOUS at 19:49

## 2018-01-19 RX ADMIN — OXYCODONE HYDROCHLORIDE 5 MG: 5 TABLET ORAL at 12:35

## 2018-01-19 RX ADMIN — HYDROMORPHONE HYDROCHLORIDE 0.5 MG: 2 INJECTION INTRAMUSCULAR; INTRAVENOUS; SUBCUTANEOUS at 05:23

## 2018-01-19 RX ADMIN — OXYCODONE HYDROCHLORIDE 5 MG: 5 TABLET ORAL at 21:36

## 2018-01-19 RX ADMIN — FAMOTIDINE 20 MG: 20 TABLET, FILM COATED ORAL at 20:40

## 2018-01-19 RX ADMIN — HYDROMORPHONE HYDROCHLORIDE 0.5 MG: 2 INJECTION INTRAMUSCULAR; INTRAVENOUS; SUBCUTANEOUS at 08:44

## 2018-01-19 RX ADMIN — FAMOTIDINE 20 MG: 20 TABLET, FILM COATED ORAL at 08:44

## 2018-01-19 RX ADMIN — OXYCODONE HYDROCHLORIDE 5 MG: 5 TABLET ORAL at 02:32

## 2018-01-19 RX ADMIN — OXYCODONE HYDROCHLORIDE 5 MG: 5 TABLET ORAL at 16:57

## 2018-01-19 RX ADMIN — LORAZEPAM 1 MG: 2 INJECTION INTRAMUSCULAR; INTRAVENOUS at 01:10

## 2018-01-19 RX ADMIN — LORAZEPAM 1 MG: 2 INJECTION INTRAMUSCULAR; INTRAVENOUS at 23:16

## 2018-01-19 ASSESSMENT — ENCOUNTER SYMPTOMS
ABDOMINAL PAIN: 1
HEARTBURN: 0
PALPITATIONS: 0
FEVER: 0
VOMITING: 0
NAUSEA: 0
SHORTNESS OF BREATH: 0
COUGH: 0
DIARRHEA: 0
CHILLS: 0

## 2018-01-19 ASSESSMENT — PAIN SCALES - GENERAL
PAINLEVEL_OUTOF10: 8
PAINLEVEL_OUTOF10: 5
PAINLEVEL_OUTOF10: 4
PAINLEVEL_OUTOF10: 6
PAINLEVEL_OUTOF10: 8
PAINLEVEL_OUTOF10: 6
PAINLEVEL_OUTOF10: 6
PAINLEVEL_OUTOF10: 8

## 2018-01-19 NOTE — CARE PLAN
Problem: Communication  Goal: The ability to communicate needs accurately and effectively will improve  Outcome: PROGRESSING AS EXPECTED  Communicates needs effectively  Calls appropriately    Problem: Knowledge Deficit  Goal: Knowledge of disease process/condition, treatment plan, diagnostic tests, and medications will improve  Outcome: PROGRESSING AS EXPECTED  Verbalizes understanding of treatment plan  Demonstrates ostomy care    Problem: Psychosocial Needs:  Goal: Level of anxiety will decrease  Outcome: PROGRESSING SLOWER THAN EXPECTED  Patient reports moderate anxiety  Social work and treatment team aware  Consult placed for complex case management

## 2018-01-19 NOTE — PROGRESS NOTES
Patient reported new drainage from medial lap site. Upon assessment RN found moderate serous drainage. Dr. Gardner's office paged.

## 2018-01-19 NOTE — PROGRESS NOTES
Surgical Progress Note    Author: Hortencia Ha Date & Time created: 2018   8:09 AM     Interval Events:  POD#7 Diagnostic laparoscopy, creation of diverting colostomy and mucous fistula. Pt feeling a little better this morning. Tolerating fulls without nausea/vomiting.  Pt continues to receive scheduled MS Contin BID, baseline home dose. Opioid tolerant. Admits to incisional abdominal pain and at ostomy site. Serosanguinous drainage from incision site. Ascites on CT. Pass gas and loose stool from ostomy.  Pt denies dizziness, lightheadedness, shortness of breath.  Pt is ambulating the cunningham. Voiding without difficulty. Drop in Hb from yesterday, all draws from port per RN.  Review of Systems   Constitutional: Negative for chills and fever.   Respiratory: Negative for cough and shortness of breath.    Cardiovascular: Negative for chest pain and palpitations.   Gastrointestinal: Positive for abdominal pain (incisional and ostomy site). Negative for diarrhea, heartburn, nausea and vomiting.   Genitourinary: Negative for dysuria.   Skin: Negative for rash.     Hemodynamics:  Temp (24hrs), Av.1 °C (98.7 °F), Min:36.8 °C (98.3 °F), Max:37.1 °C (98.8 °F)  Temperature: 37.1 °C (98.8 °F)  Pulse  Av.3  Min: 54  Max: 122   Blood Pressure: 102/73     Respiratory:    Respiration: 16, Pulse Oximetry: 91 %        RUL Breath Sounds: Clear, RML Breath Sounds: Clear, RLL Breath Sounds: Diminished, MELISSA Breath Sounds: Clear, LLL Breath Sounds: Diminished  Neuro:  GCS       Fluids:    Intake/Output Summary (Last 24 hours) at 18 0809  Last data filed at 18 0400   Gross per 24 hour   Intake             2480 ml   Output                0 ml   Net             2480 ml              Current Diet Order   Procedures   • DIET ORDER     Physical Exam   Constitutional: She is oriented to person, place, and time. She appears well-developed and well-nourished. No distress.   HENT:   Head: Normocephalic and atraumatic.   Eyes:  Conjunctivae are normal.   Neck: Normal range of motion. Neck supple.   Cardiovascular: Normal rate and regular rhythm.    occ tachycardia to 110   Pulmonary/Chest: Effort normal. No respiratory distress.   Abdominal: Soft. She exhibits distension (mild). She exhibits no mass. There is tenderness (mild incisional and ostomy site). There is no rebound and no guarding.   Ostomy dusky, edematous, serosanguinous drainage with brown loose stool output in applicance   Musculoskeletal: Normal range of motion.   Neurological: She is alert and oriented to person, place, and time.   Skin: Skin is warm and dry. No rash noted. No erythema. No pallor.   Serosanguinous drainage from 1 lap incision site. All other incisions C/D/I.    Psychiatric: She has a normal mood and affect.     Labs:  Recent Results (from the past 24 hour(s))   CBC WITH DIFFERENTIAL    Collection Time: 01/19/18  1:20 AM   Result Value Ref Range    WBC 7.4 4.8 - 10.8 K/uL    RBC 2.62 (L) 4.20 - 5.40 M/uL    Hemoglobin 7.6 (L) 12.0 - 16.0 g/dL    Hematocrit 23.6 (L) 37.0 - 47.0 %    MCV 90.1 81.4 - 97.8 fL    MCH 29.0 27.0 - 33.0 pg    MCHC 32.2 (L) 33.6 - 35.0 g/dL    RDW 43.8 35.9 - 50.0 fL    Platelet Count 341 164 - 446 K/uL    MPV 10.4 9.0 - 12.9 fL    Neutrophils-Polys 67.70 44.00 - 72.00 %    Lymphocytes 17.70 (L) 22.00 - 41.00 %    Monocytes 11.20 0.00 - 13.40 %    Eosinophils 2.70 0.00 - 6.90 %    Basophils 0.30 0.00 - 1.80 %    Immature Granulocytes 0.40 0.00 - 0.90 %    Nucleated RBC 0.00 /100 WBC    Neutrophils (Absolute) 5.04 2.00 - 7.15 K/uL    Lymphs (Absolute) 1.32 1.00 - 4.80 K/uL    Monos (Absolute) 0.83 0.00 - 0.85 K/uL    Eos (Absolute) 0.20 0.00 - 0.51 K/uL    Baso (Absolute) 0.02 0.00 - 0.12 K/uL    Immature Granulocytes (abs) 0.03 0.00 - 0.11 K/uL    NRBC (Absolute) 0.00 K/uL   COMP METABOLIC PANEL    Collection Time: 01/19/18  1:20 AM   Result Value Ref Range    Sodium 137 135 - 145 mmol/L    Potassium 3.5 (L) 3.6 - 5.5 mmol/L     Chloride 104 96 - 112 mmol/L    Co2 27 20 - 33 mmol/L    Anion Gap 6.0 0.0 - 11.9    Glucose 84 65 - 99 mg/dL    Bun <3 (L) 8 - 22 mg/dL    Creatinine 0.37 (L) 0.50 - 1.40 mg/dL    Calcium 7.9 (L) 8.5 - 10.5 mg/dL    AST(SGOT) 11 (L) 12 - 45 U/L    ALT(SGPT) 8 2 - 50 U/L    Alkaline Phosphatase 43 30 - 99 U/L    Total Bilirubin 0.3 0.1 - 1.5 mg/dL    Albumin 2.4 (L) 3.2 - 4.9 g/dL    Total Protein 5.3 (L) 6.0 - 8.2 g/dL    Globulin 2.9 1.9 - 3.5 g/dL    A-G Ratio 0.8 g/dL   ESTIMATED GFR    Collection Time: 01/19/18  1:20 AM   Result Value Ref Range    GFR If African American >60 >60 mL/min/1.73 m 2    GFR If Non African American >60 >60 mL/min/1.73 m 2   CBC WITH DIFFERENTIAL    Collection Time: 01/19/18  6:39 AM   Result Value Ref Range    WBC 6.1 4.8 - 10.8 K/uL    RBC 2.56 (L) 4.20 - 5.40 M/uL    Hemoglobin 7.7 (L) 12.0 - 16.0 g/dL    Hematocrit 23.3 (L) 37.0 - 47.0 %    MCV 91.0 81.4 - 97.8 fL    MCH 30.1 27.0 - 33.0 pg    MCHC 33.0 (L) 33.6 - 35.0 g/dL    RDW 44.4 35.9 - 50.0 fL    Platelet Count 312 164 - 446 K/uL    MPV 10.5 9.0 - 12.9 fL    Neutrophils-Polys 72.30 (H) 44.00 - 72.00 %    Lymphocytes 15.00 (L) 22.00 - 41.00 %    Monocytes 9.20 0.00 - 13.40 %    Eosinophils 2.60 0.00 - 6.90 %    Basophils 0.20 0.00 - 1.80 %    Immature Granulocytes 0.70 0.00 - 0.90 %    Nucleated RBC 0.00 /100 WBC    Neutrophils (Absolute) 4.43 2.00 - 7.15 K/uL    Lymphs (Absolute) 0.92 (L) 1.00 - 4.80 K/uL    Monos (Absolute) 0.56 0.00 - 0.85 K/uL    Eos (Absolute) 0.16 0.00 - 0.51 K/uL    Baso (Absolute) 0.01 0.00 - 0.12 K/uL    Immature Granulocytes (abs) 0.04 0.00 - 0.11 K/uL    NRBC (Absolute) 0.00 K/uL     Medical Decision Making, by Problem:  There are no active hospital problems to display for this patient.    Plan:  POD#7 Diagnostic laparoscopy, creation of diverting colostomy and mucous fistula. Pt is alert and oriented, NAD. Breathing unlabored. Tolerating PO.  Abdomen is softer, mildly distended, tender at incision  sites and at ostomy site. Incisions C/D/I. Serosanguinous drainage from lap incision site. Ostomy dusky, edematous, serosanguinous drainage and loose brown stool output in appliance, +flatus.  VS reviewed, HR in 90s with occ tachy to 110, afebrile. Labs reviewed, Hb 7.6 with redraw 7.7, both from port. Pt asymptomatic. Reordered peripheral CBC with Type and screen. Hold lovenox. Encouraged ambulation and incentive spirometry. Continue full liquids. Transition to oral pain meds. Continue bowel regimen. Await return of bowel function. Discussed with Dr. Rosen and [facility] surgical. [place] surgical covering for emergencies.      Quality Measures:    Reviewed items::  Labs reviewed  Junior catheter::  No Junior  DVT prophylaxis pharmacological::  Enoxaparin (Lovenox)  DVT prophylaxis - mechanical:  SCDs      Discussed patient condition with RN, Patient and Dr. Rosen

## 2018-01-19 NOTE — PROGRESS NOTES
Rn notified PA regarding new drainage from lap site, and the large decrease in white blood cells. No new orders at this time.

## 2018-01-19 NOTE — PROGRESS NOTES
Bedside report received.  Assessment complete.  A&O x 4. Patient calls appropriately.  Denies numbness and tingling.   Patient ambulates with standby assist.   Patient has 8/10 pain. Medicated per MAR.  Reports some nausea and little interest in food.   Surgical lap sites on abdomen; new colostomy LLQ.  + void, + flatus (osomty). Ostomy has primarily liquid output. MD aware.  Patient denies SOB.  Review plan with of care with patient. Call light and personal belongings with in reach. Hourly rounding in place. All needs met at this time.

## 2018-01-19 NOTE — PROGRESS NOTES
Pt AOx4, up with SBA, calls appropriately. VSS on RA, pt will place O2 on for comfort at times. Pt reports pain controlled with PRN PO and IV pain medications. Tolerating a full liquid diet. Lap stabs to abdomen, Mid lap stab putting out a moderate amount of SS drainage. Bandaged changed this evening. Will monitor closely. Ostomy large firm and slightly dusky in color, putting out liquid stool. All needs met at this time.

## 2018-01-19 NOTE — DISCHARGE PLANNING
TCN met with patient at bedside to discuss the care teams recommendation for HH. Patient is agreeable to suggestion and selected [facility]. Choice faxed to [facility]. TCN to follow as needed.

## 2018-01-19 NOTE — DISCHARGE PLANNING
[facility] received a HH choice form. Per the choice form the referral has been sent to [facility]

## 2018-01-19 NOTE — FACE TO FACE
Face to Face Supporting Documentation - Home Health    The encounter with this patient was in whole or in part the primary reason for home health admission.    Date of encounter:   Patient:                    MRN:                       YOB: 2016  Clau Lerner  [id number]  1960     Home health to see patient for:  Skilled Nursing care for assessment, interventions & education and Comment: Ostomy care and education    Skilled need for:  Surgical Aftercare Ostomy care and Comment: Ostomy care, education, and supplies    Skilled nursing interventions to include:  Comment: ostomy care    Homebound status evidenced by:  Needs the assistance of another person in order to leave the home. Leaving home requires a considerable and taxing effort. There is a normal inability to leave the home.    Community Physician to provide follow up care: Kiesha Mao M.D.     Optional Interventions? Yes, Details: Ostomy care, education, and supplies      I certify the face to face encounter for this home health care referral meets the CMS requirements and the encounter/clinical assessment with the patient was, in whole, or in part, for the medical condition(s) listed above, which is the primary reason for home health care. Based on my clinical findings: the service(s) are medically necessary, support the need for home health care, and the homebound criteria are met.  I certify that this patient has had a face to face encounter by myself.  Hortencia Ha P.A.-C. - NPI: [id number]

## 2018-01-19 NOTE — CARE PLAN
Problem: Bowel/Gastric:  Goal: Normal bowel function is maintained or improved  Outcome: PROGRESSING SLOWER THAN EXPECTED  Ostomy putting out liquid stool    Problem: Pain Management  Goal: Pain level will decrease to patient's comfort goal  Outcome: PROGRESSING SLOWER THAN EXPECTED  Pain controlled with PRN PO and IV pain medications.    Problem: Mobility  Goal: Risk for activity intolerance will decrease  Outcome: PROGRESSING AS EXPECTED  Pt up with family ambulating the unit frequently.

## 2018-01-19 NOTE — DISCHARGE PLANNING
We are currently verifying MD acceptance of your patient. This will be resolved ASAP. If you want this escalated for a faster response please call [phone] and press option #2. Thank you for your patience.  Respectfully,   [facility]

## 2018-01-19 NOTE — PROGRESS NOTES
Per flowsheets by was to have her port needle changed on 1/17. Pt refused this Evening, will have day RN attempt to change as appropriate.

## 2018-01-20 LAB
ALBUMIN SERPL BCP-MCNC: 2.8 G/DL (ref 3.2–4.9)
ALBUMIN/GLOB SERPL: 1.1 G/DL
ALP SERPL-CCNC: 40 U/L (ref 30–99)
ALT SERPL-CCNC: 6 U/L (ref 2–50)
ANION GAP SERPL CALC-SCNC: 5 MMOL/L (ref 0–11.9)
AST SERPL-CCNC: 11 U/L (ref 12–45)
BASOPHILS # BLD AUTO: 0.3 % (ref 0–1.8)
BASOPHILS # BLD: 0.02 K/UL (ref 0–0.12)
BILIRUB SERPL-MCNC: 0.2 MG/DL (ref 0.1–1.5)
BUN SERPL-MCNC: <3 MG/DL (ref 8–22)
CALCIUM SERPL-MCNC: 8.3 MG/DL (ref 8.5–10.5)
CHLORIDE SERPL-SCNC: 105 MMOL/L (ref 96–112)
CO2 SERPL-SCNC: 27 MMOL/L (ref 20–33)
CREAT SERPL-MCNC: 0.4 MG/DL (ref 0.5–1.4)
EOSINOPHIL # BLD AUTO: 0.23 K/UL (ref 0–0.51)
EOSINOPHIL NFR BLD: 3.3 % (ref 0–6.9)
ERYTHROCYTE [DISTWIDTH] IN BLOOD BY AUTOMATED COUNT: 44.7 FL (ref 35.9–50)
GLOBULIN SER CALC-MCNC: 2.6 G/DL (ref 1.9–3.5)
GLUCOSE SERPL-MCNC: 98 MG/DL (ref 65–99)
HCT VFR BLD AUTO: 25.3 % (ref 37–47)
HGB BLD-MCNC: 8 G/DL (ref 12–16)
IMM GRANULOCYTES # BLD AUTO: 0.05 K/UL (ref 0–0.11)
IMM GRANULOCYTES NFR BLD AUTO: 0.7 % (ref 0–0.9)
LYMPHOCYTES # BLD AUTO: 1.18 K/UL (ref 1–4.8)
LYMPHOCYTES NFR BLD: 17.1 % (ref 22–41)
MCH RBC QN AUTO: 28.8 PG (ref 27–33)
MCHC RBC AUTO-ENTMCNC: 31.6 G/DL (ref 33.6–35)
MCV RBC AUTO: 91 FL (ref 81.4–97.8)
MONOCYTES # BLD AUTO: 0.66 K/UL (ref 0–0.85)
MONOCYTES NFR BLD AUTO: 9.5 % (ref 0–13.4)
NEUTROPHILS # BLD AUTO: 4.78 K/UL (ref 2–7.15)
NEUTROPHILS NFR BLD: 69.1 % (ref 44–72)
NRBC # BLD AUTO: 0 K/UL
NRBC BLD-RTO: 0 /100 WBC
PLATELET # BLD AUTO: 381 K/UL (ref 164–446)
PMV BLD AUTO: 10.7 FL (ref 9–12.9)
POTASSIUM SERPL-SCNC: 3.5 MMOL/L (ref 3.6–5.5)
PROT SERPL-MCNC: 5.4 G/DL (ref 6–8.2)
RBC # BLD AUTO: 2.78 M/UL (ref 4.2–5.4)
SODIUM SERPL-SCNC: 137 MMOL/L (ref 135–145)
WBC # BLD AUTO: 6.9 K/UL (ref 4.8–10.8)

## 2018-01-20 PROCEDURE — 80053 COMPREHEN METABOLIC PANEL: CPT

## 2018-01-20 PROCEDURE — 36415 COLL VENOUS BLD VENIPUNCTURE: CPT

## 2018-01-20 PROCEDURE — 85025 COMPLETE CBC W/AUTO DIFF WBC: CPT

## 2018-01-20 PROCEDURE — A9270 NON-COVERED ITEM OR SERVICE: HCPCS | Performed by: PHARMACIST

## 2018-01-20 PROCEDURE — 700102 HCHG RX REV CODE 250 W/ 637 OVERRIDE(OP): Performed by: PHARMACIST

## 2018-01-20 PROCEDURE — A9270 NON-COVERED ITEM OR SERVICE: HCPCS | Performed by: PHYSICIAN ASSISTANT

## 2018-01-20 PROCEDURE — A9270 NON-COVERED ITEM OR SERVICE: HCPCS | Performed by: COLON & RECTAL SURGERY

## 2018-01-20 PROCEDURE — 700102 HCHG RX REV CODE 250 W/ 637 OVERRIDE(OP): Performed by: PHYSICIAN ASSISTANT

## 2018-01-20 PROCEDURE — 700111 HCHG RX REV CODE 636 W/ 250 OVERRIDE (IP): Performed by: PHYSICIAN ASSISTANT

## 2018-01-20 PROCEDURE — 770006 HCHG ROOM/CARE - MED/SURG/GYN SEMI*

## 2018-01-20 PROCEDURE — 700102 HCHG RX REV CODE 250 W/ 637 OVERRIDE(OP): Performed by: COLON & RECTAL SURGERY

## 2018-01-20 RX ORDER — OXYCODONE HYDROCHLORIDE 10 MG/1
10 TABLET ORAL EVERY 4 HOURS PRN
Status: DISCONTINUED | OUTPATIENT
Start: 2018-01-20 | End: 2018-01-21 | Stop reason: HOSPADM

## 2018-01-20 RX ADMIN — OXYCODONE HYDROCHLORIDE 10 MG: 10 TABLET ORAL at 10:52

## 2018-01-20 RX ADMIN — HYDROMORPHONE HYDROCHLORIDE 1 MG: 2 INJECTION INTRAMUSCULAR; INTRAVENOUS; SUBCUTANEOUS at 00:19

## 2018-01-20 RX ADMIN — OXYCODONE HYDROCHLORIDE 5 MG: 5 TABLET ORAL at 02:13

## 2018-01-20 RX ADMIN — HYDROMORPHONE HYDROCHLORIDE 1 MG: 2 INJECTION INTRAMUSCULAR; INTRAVENOUS; SUBCUTANEOUS at 04:24

## 2018-01-20 RX ADMIN — FAMOTIDINE 20 MG: 20 TABLET, FILM COATED ORAL at 08:25

## 2018-01-20 RX ADMIN — MORPHINE SULFATE 30 MG: 30 TABLET, EXTENDED RELEASE ORAL at 08:25

## 2018-01-20 RX ADMIN — OXYCODONE HYDROCHLORIDE 5 MG: 5 TABLET ORAL at 06:00

## 2018-01-20 RX ADMIN — MORPHINE SULFATE 30 MG: 30 TABLET, EXTENDED RELEASE ORAL at 21:51

## 2018-01-20 RX ADMIN — NYSTATIN 500000 UNITS: 100000 SUSPENSION ORAL at 14:04

## 2018-01-20 RX ADMIN — OXYCODONE HYDROCHLORIDE 10 MG: 10 TABLET ORAL at 19:44

## 2018-01-20 RX ADMIN — FAMOTIDINE 20 MG: 20 TABLET, FILM COATED ORAL at 19:45

## 2018-01-20 RX ADMIN — HYDROMORPHONE HYDROCHLORIDE 1 MG: 2 INJECTION INTRAMUSCULAR; INTRAVENOUS; SUBCUTANEOUS at 08:24

## 2018-01-20 RX ADMIN — OXYCODONE HYDROCHLORIDE 10 MG: 10 TABLET ORAL at 15:01

## 2018-01-20 RX ADMIN — NYSTATIN 500000 UNITS: 100000 SUSPENSION ORAL at 08:24

## 2018-01-20 RX ADMIN — NYSTATIN 500000 UNITS: 100000 SUSPENSION ORAL at 16:53

## 2018-01-20 RX ADMIN — HYDROMORPHONE HYDROCHLORIDE 1 MG: 2 INJECTION INTRAMUSCULAR; INTRAVENOUS; SUBCUTANEOUS at 16:53

## 2018-01-20 ASSESSMENT — PAIN SCALES - GENERAL
PAINLEVEL_OUTOF10: 8
PAINLEVEL_OUTOF10: 8
PAINLEVEL_OUTOF10: 7
PAINLEVEL_OUTOF10: 6
PAINLEVEL_OUTOF10: 8
PAINLEVEL_OUTOF10: 6
PAINLEVEL_OUTOF10: 7
PAINLEVEL_OUTOF10: 8

## 2018-01-20 ASSESSMENT — ENCOUNTER SYMPTOMS
NAUSEA: 0
DIARRHEA: 0
SHORTNESS OF BREATH: 0
VOMITING: 0
HEARTBURN: 0
ABDOMINAL PAIN: 1
FEVER: 0
PALPITATIONS: 0
COUGH: 0
CHILLS: 0

## 2018-01-20 NOTE — PROGRESS NOTES
Surgical Progress Note    Author: Hortencia Ha Date & Time created: 2018   9:15 AM     Interval Events:  POD#8 Diagnostic laparoscopy, creation of diverting colostomy and mucous fistula. Pt doing well this morning. Tolerating fulls without nausea/vomiting.  Pt continues to receive scheduled MS Contin BID, baseline home dose. Opioid tolerant. Discussed transitioning to oral pain meds only. Discussed increase oral oxycodone dose with less frequent IV dilaudid. Admits to incisional abdominal pain and at ostomy site. Pass gas from ostomy, no stool since yesterday morning.  Pt is ambulating and voiding.  Review of Systems   Constitutional: Negative for chills and fever.   Respiratory: Negative for cough and shortness of breath.    Cardiovascular: Negative for chest pain and palpitations.   Gastrointestinal: Positive for abdominal pain (incisional and ostomy site). Negative for diarrhea, heartburn, nausea and vomiting.   Genitourinary: Negative for dysuria.   Skin: Negative for rash.     Hemodynamics:  Temp (24hrs), Av.7 °C (98.1 °F), Min:36.4 °C (97.5 °F), Max:37 °C (98.6 °F)  Temperature: 36.4 °C (97.5 °F)  Pulse  Av.3  Min: 54  Max: 122   Blood Pressure: 101/67     Respiratory:    Respiration: 18, Pulse Oximetry: 92 %        RUL Breath Sounds: Clear, RML Breath Sounds: Clear, RLL Breath Sounds: Diminished, MELISSA Breath Sounds: Clear, LLL Breath Sounds: Diminished  Neuro:  GCS       Fluids:    Intake/Output Summary (Last 24 hours) at 18 0915  Last data filed at 18 2000   Gross per 24 hour   Intake              600 ml   Output              600 ml   Net                0 ml                Current Diet Order   Procedures   • DIET ORDER     Physical Exam   Constitutional: She is oriented to person, place, and time. She appears well-developed and well-nourished. No distress.   HENT:   Head: Normocephalic and atraumatic.   Eyes: Conjunctivae are normal.   Neck: Normal range of motion. Neck supple.    Cardiovascular: Normal rate and regular rhythm.    occ tachycardia to 100s   Pulmonary/Chest: Effort normal. No respiratory distress.   Abdominal: Soft. She exhibits distension (mild). She exhibits no mass. There is tenderness (mild incisional and ostomy site). There is no rebound and no guarding.   Ostomy dusky, less edematous, serosanguinous drainage, dark blood drainage in applicance. Loose brown stool yesterday morning.   Musculoskeletal: Normal range of motion.   Neurological: She is alert and oriented to person, place, and time.   Skin: Skin is warm and dry. No rash noted. No erythema. No pallor.   Incisions C/D/I.    Psychiatric: She has a normal mood and affect.     Labs:  Recent Results (from the past 24 hour(s))   CBC WITHOUT DIFFERENTIAL    Collection Time: 01/19/18  9:49 AM   Result Value Ref Range    WBC 7.4 4.8 - 10.8 K/uL    RBC 3.05 (L) 4.20 - 5.40 M/uL    Hemoglobin 8.9 (L) 12.0 - 16.0 g/dL    Hematocrit 27.6 (L) 37.0 - 47.0 %    MCV 90.5 81.4 - 97.8 fL    MCH 29.2 27.0 - 33.0 pg    MCHC 32.2 (L) 33.6 - 35.0 g/dL    RDW 44.3 35.9 - 50.0 fL    Platelet Count 364 164 - 446 K/uL    MPV 10.5 9.0 - 12.9 fL   CBC WITH DIFFERENTIAL    Collection Time: 01/20/18  4:00 AM   Result Value Ref Range    WBC 6.9 4.8 - 10.8 K/uL    RBC 2.78 (L) 4.20 - 5.40 M/uL    Hemoglobin 8.0 (L) 12.0 - 16.0 g/dL    Hematocrit 25.3 (L) 37.0 - 47.0 %    MCV 91.0 81.4 - 97.8 fL    MCH 28.8 27.0 - 33.0 pg    MCHC 31.6 (L) 33.6 - 35.0 g/dL    RDW 44.7 35.9 - 50.0 fL    Platelet Count 381 164 - 446 K/uL    MPV 10.7 9.0 - 12.9 fL    Neutrophils-Polys 69.10 44.00 - 72.00 %    Lymphocytes 17.10 (L) 22.00 - 41.00 %    Monocytes 9.50 0.00 - 13.40 %    Eosinophils 3.30 0.00 - 6.90 %    Basophils 0.30 0.00 - 1.80 %    Immature Granulocytes 0.70 0.00 - 0.90 %    Nucleated RBC 0.00 /100 WBC    Neutrophils (Absolute) 4.78 2.00 - 7.15 K/uL    Lymphs (Absolute) 1.18 1.00 - 4.80 K/uL    Monos (Absolute) 0.66 0.00 - 0.85 K/uL    Eos (Absolute)  0.23 0.00 - 0.51 K/uL    Baso (Absolute) 0.02 0.00 - 0.12 K/uL    Immature Granulocytes (abs) 0.05 0.00 - 0.11 K/uL    NRBC (Absolute) 0.00 K/uL   COMP METABOLIC PANEL    Collection Time: 01/20/18  4:00 AM   Result Value Ref Range    Sodium 137 135 - 145 mmol/L    Potassium 3.5 (L) 3.6 - 5.5 mmol/L    Chloride 105 96 - 112 mmol/L    Co2 27 20 - 33 mmol/L    Anion Gap 5.0 0.0 - 11.9    Glucose 98 65 - 99 mg/dL    Bun <3 (L) 8 - 22 mg/dL    Creatinine 0.40 (L) 0.50 - 1.40 mg/dL    Calcium 8.3 (L) 8.5 - 10.5 mg/dL    AST(SGOT) 11 (L) 12 - 45 U/L    ALT(SGPT) 6 2 - 50 U/L    Alkaline Phosphatase 40 30 - 99 U/L    Total Bilirubin 0.2 0.1 - 1.5 mg/dL    Albumin 2.8 (L) 3.2 - 4.9 g/dL    Total Protein 5.4 (L) 6.0 - 8.2 g/dL    Globulin 2.6 1.9 - 3.5 g/dL    A-G Ratio 1.1 g/dL   ESTIMATED GFR    Collection Time: 01/20/18  4:00 AM   Result Value Ref Range    GFR If African American >60 >60 mL/min/1.73 m 2    GFR If Non African American >60 >60 mL/min/1.73 m 2     Medical Decision Making, by Problem:  Active Hospital Problems    Diagnosis   • Colostomy care (CMS-[facility]) [Z43.3]     Plan:  POD#8 Diagnostic laparoscopy, creation of diverting colostomy and mucous fistula. Pt is alert and oriented, NAD. Breathing unlabored. Tolerating PO.  Abdomen is soft, mildly distended, tender at incision sites and at ostomy site. Incisions C/D/I.  Ostomy dusky, less edematous, serosanguinous and thin dark blood drainage, +flatus.  VS reviewed, afebrile. Labs reviewed, Hb stable.  Encouraged ambulation and incentive spirometry. Transition to oral pain meds, increase oxycodone to 10mg. Wean IV dilaudid. Continue miralax daily. Advance to GI soft diet. Likely home tomorrow with Home health. Discussed with Dr. Rosen and [facility] surgical. [facility] surgical covering for emergencies.      Quality Measures:    Reviewed items::  Labs reviewed  Junior catheter::  No Junior  DVT prophylaxis pharmacological::  Enoxaparin (Lovenox)  DVT prophylaxis -  mechanical:  SCDs      Discussed patient condition with RN, Patient and Dr. Rosen     Seen  Stable? Home in am

## 2018-01-20 NOTE — PROGRESS NOTES
Pt report received. Assumed care of pt. Pt resting in bed with no signs of distress, call light within reach, personal items available. Pt requesting to see wound care to change appliance. Pt notified RN will contact wound care after change of shift is complete.

## 2018-01-20 NOTE — PROGRESS NOTES
"Assumed care of patient from day RN. A&Ox 4. Up independently. Room air. Moves all extremities, denies numbness or tingling. Voiding, hypoactive BS, + flatus, LBM active ostomy. Full liquid diet, denies nausea/ vomiting. Wound(s): abdominal surgical lap-stabs CDI. Patient reports 8 out of 10 pain,  medicated per MAR. Pt is wearing treaded slipper socks, lower bedrails are down. Pt calls appropriatly. Discussed POC, all questions answered at this time. Bed is locked and in the lowest position, call light within reach, Q 2 hour rounding in place.            Pt is refusing bed alarm, A&OX4, has been given education regarding the need and patient continues to refuse bed alarm.    /75   Pulse 96   Temp 36.8 °C (98.2 °F)   Resp 18   Ht 1.547 m (5' 0.9\")   Wt 46.7 kg (102 lb 15.3 oz)   LMP  (LMP Unknown)   SpO2 98%   Breastfeeding? No   BMI 19.52 kg/m²     "

## 2018-01-20 NOTE — CARE PLAN
Problem: Safety  Goal: Will remain free from injury    Intervention: Provide assistance with mobility  Pt up to bathroom with standby from staff. Bed in lowest locked position, non-slip socks on pt, and call light within reach.       Problem: Knowledge Deficit  Goal: Knowledge of disease process/condition, treatment plan, diagnostic tests, and medications will improve  Pt updated on POC and pain management expectations

## 2018-01-21 VITALS
WEIGHT: 102.95 LBS | HEART RATE: 84 BPM | BODY MASS INDEX: 19.44 KG/M2 | SYSTOLIC BLOOD PRESSURE: 103 MMHG | HEIGHT: 61 IN | RESPIRATION RATE: 16 BRPM | DIASTOLIC BLOOD PRESSURE: 63 MMHG | OXYGEN SATURATION: 94 % | TEMPERATURE: 97.6 F

## 2018-01-21 PROBLEM — Z98.890 POSTOPERATIVE STATE: Status: ACTIVE | Noted: 2018-01-21

## 2018-01-21 LAB
ANION GAP SERPL CALC-SCNC: 7 MMOL/L (ref 0–11.9)
BUN SERPL-MCNC: 3 MG/DL (ref 8–22)
CALCIUM SERPL-MCNC: 8.4 MG/DL (ref 8.5–10.5)
CHLORIDE SERPL-SCNC: 105 MMOL/L (ref 96–112)
CO2 SERPL-SCNC: 28 MMOL/L (ref 20–33)
CREAT SERPL-MCNC: 0.45 MG/DL (ref 0.5–1.4)
ERYTHROCYTE [DISTWIDTH] IN BLOOD BY AUTOMATED COUNT: 45.8 FL (ref 35.9–50)
GLUCOSE SERPL-MCNC: 92 MG/DL (ref 65–99)
HCT VFR BLD AUTO: 24.1 % (ref 37–47)
HGB BLD-MCNC: 7.7 G/DL (ref 12–16)
MCH RBC QN AUTO: 29.1 PG (ref 27–33)
MCHC RBC AUTO-ENTMCNC: 32 G/DL (ref 33.6–35)
MCV RBC AUTO: 90.9 FL (ref 81.4–97.8)
PLATELET # BLD AUTO: 366 K/UL (ref 164–446)
PMV BLD AUTO: 10.5 FL (ref 9–12.9)
POTASSIUM SERPL-SCNC: 3.5 MMOL/L (ref 3.6–5.5)
RBC # BLD AUTO: 2.65 M/UL (ref 4.2–5.4)
SODIUM SERPL-SCNC: 140 MMOL/L (ref 135–145)
WBC # BLD AUTO: 6 K/UL (ref 4.8–10.8)

## 2018-01-21 PROCEDURE — A9270 NON-COVERED ITEM OR SERVICE: HCPCS | Performed by: COLON & RECTAL SURGERY

## 2018-01-21 PROCEDURE — 700111 HCHG RX REV CODE 636 W/ 250 OVERRIDE (IP): Performed by: COLON & RECTAL SURGERY

## 2018-01-21 PROCEDURE — 36415 COLL VENOUS BLD VENIPUNCTURE: CPT

## 2018-01-21 PROCEDURE — 700102 HCHG RX REV CODE 250 W/ 637 OVERRIDE(OP): Performed by: PHARMACIST

## 2018-01-21 PROCEDURE — 700102 HCHG RX REV CODE 250 W/ 637 OVERRIDE(OP): Performed by: COLON & RECTAL SURGERY

## 2018-01-21 PROCEDURE — A9270 NON-COVERED ITEM OR SERVICE: HCPCS | Performed by: PHYSICIAN ASSISTANT

## 2018-01-21 PROCEDURE — 700111 HCHG RX REV CODE 636 W/ 250 OVERRIDE (IP): Performed by: PHYSICIAN ASSISTANT

## 2018-01-21 PROCEDURE — 700102 HCHG RX REV CODE 250 W/ 637 OVERRIDE(OP): Performed by: PHYSICIAN ASSISTANT

## 2018-01-21 PROCEDURE — A9270 NON-COVERED ITEM OR SERVICE: HCPCS | Performed by: PHARMACIST

## 2018-01-21 PROCEDURE — 80048 BASIC METABOLIC PNL TOTAL CA: CPT

## 2018-01-21 PROCEDURE — 85027 COMPLETE CBC AUTOMATED: CPT

## 2018-01-21 RX ORDER — OXYCODONE HYDROCHLORIDE 10 MG/1
10 TABLET ORAL EVERY 4 HOURS PRN
Qty: 30 TAB | Refills: 0 | Status: SHIPPED | OUTPATIENT
Start: 2018-01-21 | End: 2018-01-26

## 2018-01-21 RX ADMIN — OXYCODONE HYDROCHLORIDE 10 MG: 10 TABLET ORAL at 07:14

## 2018-01-21 RX ADMIN — LORAZEPAM 1 MG: 2 INJECTION INTRAMUSCULAR; INTRAVENOUS at 00:47

## 2018-01-21 RX ADMIN — OXYCODONE HYDROCHLORIDE 10 MG: 10 TABLET ORAL at 11:22

## 2018-01-21 RX ADMIN — NYSTATIN 500000 UNITS: 100000 SUSPENSION ORAL at 09:33

## 2018-01-21 RX ADMIN — ALPRAZOLAM 0.5 MG: 0.5 TABLET ORAL at 05:08

## 2018-01-21 RX ADMIN — HEPARIN 500 UNITS: 100 SYRINGE at 15:20

## 2018-01-21 RX ADMIN — POLYETHYLENE GLYCOL 3350 1 PACKET: 17 POWDER, FOR SOLUTION ORAL at 09:34

## 2018-01-21 RX ADMIN — OXYCODONE HYDROCHLORIDE 10 MG: 10 TABLET ORAL at 02:56

## 2018-01-21 RX ADMIN — FAMOTIDINE 20 MG: 20 TABLET, FILM COATED ORAL at 09:33

## 2018-01-21 RX ADMIN — MORPHINE SULFATE 30 MG: 30 TABLET, EXTENDED RELEASE ORAL at 09:33

## 2018-01-21 ASSESSMENT — PAIN SCALES - GENERAL
PAINLEVEL_OUTOF10: 5
PAINLEVEL_OUTOF10: 5
PAINLEVEL_OUTOF10: 6
PAINLEVEL_OUTOF10: 9
PAINLEVEL_OUTOF10: 8

## 2018-01-21 ASSESSMENT — ENCOUNTER SYMPTOMS
ABDOMINAL PAIN: 1
CHILLS: 0
VOMITING: 0
FEVER: 0
DIARRHEA: 0
HEARTBURN: 0
PALPITATIONS: 0
SHORTNESS OF BREATH: 0
NAUSEA: 0
COUGH: 0

## 2018-01-21 ASSESSMENT — LIFESTYLE VARIABLES: EVER_SMOKED: NEVER

## 2018-01-21 NOTE — PROGRESS NOTES
Dr. Rosen's PA called and notified that [facility] has not accepted pt's referral d/t not being able to verify pt's PCP. I confirmed with pt that the PCP on her facesheet was in fact her correct PCP and she stated yes.  Hortencia stated that she was okay with pt being discharged today without HH referral still pending acceptance.  Hortencia asked that I call the wound team and have them come up to do another extensive ostomy teaching with pt and give her some supplies.  If HH does not accept pt, Hortencia stated to follow up with Dr. Rosen's office for referral to outpatient wound care appointments. Pt was updated and stated understanding and agreement. Wound care was called and Ronna stated she would be up to see pt.

## 2018-01-21 NOTE — CARE PLAN
Problem: Safety  Goal: Will remain free from falls  Outcome: PROGRESSING AS EXPECTED  Pt up self with even and steady gait. Bed locked and in lowest position. Call light within reach.     Problem: Bowel/Gastric:  Goal: Normal bowel function is maintained or improved  Outcome: PROGRESSING AS EXPECTED  Last BM today via ostomy. Will assess bm and bs qshift and prn

## 2018-01-21 NOTE — PROGRESS NOTES
Patient discharged home with private vehicle and accompanied by family. Pt A&Ox4 at discharge and up self with even and steady gait. Port deaccessed prior to discharge.  Script handed to pt. Discharge instructions were reviewed with pt and pt stated she had no further questions. All belongings sent with pt.

## 2018-01-21 NOTE — DISCHARGE INSTRUCTIONS
1. DIET: Upon discharge from the hospital you may resume your normal preoperative diet. Depending on how you are feeling and whether you have nausea or not, you may wish to stay with a bland diet for the first few days. However, you can advance this as quickly as you feel ready.     2. ACTIVITIES: After discharge from the hospital, you may resume full routine activities. However, there should be no heavy lifting (greater than 15 pounds) and no strenuous activities until after your follow-up visit. Otherwise, routine activities of daily living are acceptable.     3. DRIVING: You may drive whenever you are off pain medications and are able to perform the activities needed to drive, i.e. turning, bending, twisting, etc.     4. BATHING: You may get the wound wet at any time after leaving the hospital. You may shower, but do not submerge in a bath for at least a week.     5. BOWEL FUNCTION: Constipation is common after an operation, especially with pain medications. The combination of pain medication and decreased activity level can cause constipation in otherwise normal patients. If you feel this is occurring, take a laxative (Miralax, Milk of Magnesia, Ex-Lax, Senokot, etc.) until the problem has resolved.     6. PAIN MEDICATION: You will be given a prescription for pain medication at discharge. Please take these as directed. It is important to remember not to take medications on an empty stomach as this may cause nausea.     7.CALL IF YOU HAVE: (1) Fevers to more than 101.0 F, (2) Unusual chest or leg pain, (3) Drainage or fluid from incision that may be foul smelling, increased tenderness or soreness at the wound or the wound edges are no longer together, redness or swelling at the incision site. Please do not hesitate to call with any other questions.     8. APPOINTMENT: Contact our office at [phone] for a follow-up appointment in 1-2 weeks following your procedure.     If you have any additional questions,  please do not hesitate to call the office and speak to either myself or the physician on call.     Office address:   [facility].   [address]   [address]   [place] [id number]      Discharge Instructions    Discharged to home by car with relative. Discharged via wheelchair, hospital escort: Yes.  Special equipment needed: Not Applicable    Be sure to schedule a follow-up appointment with your primary care doctor or any specialists as instructed.     Discharge Plan:   Diet Plan: Discussed  Activity Level: Discussed  Confirmed Follow up Appointment: Patient to Call and Schedule Appointment  Confirmed Symptoms Management: Discussed  Medication Reconciliation Updated: Yes  Influenza Vaccine Indication: Patient Refuses    I understand that a diet low in cholesterol, fat, and sodium is recommended for good health. Unless I have been given specific instructions below for another diet, I accept this instruction as my diet prescription.   Other diet: regular    Special Instructions: None    · Is patient discharged on Warfarin / Coumadin?   No     Depression / Suicide Risk    As you are discharged from this [facility] facility, it is important to learn how to keep safe from harming yourself.    Recognize the warning signs:  · Abrupt changes in personality, positive or negative- including increase in energy   · Giving away possessions  · Change in eating patterns- significant weight changes-  positive or negative  · Change in sleeping patterns- unable to sleep or sleeping all the time   · Unwillingness or inability to communicate  · Depression  · Unusual sadness, discouragement and loneliness  · Talk of wanting to die  · Neglect of personal appearance   · Rebelliousness- reckless behavior  · Withdrawal from people/activities they love  · Confusion- inability to concentrate     If you or a loved one observes any of these behaviors or has concerns about self-harm, here's what you can do:  · Talk about it-  your feelings and reasons for harming yourself  · Remove any means that you might use to hurt yourself (examples: pills, rope, extension cords, firearm)  · Get professional help from the community (Mental Health, Substance Abuse, psychological counseling)  · Do not be alone:Call your Safe Contact- someone whom you trust who will be there for you.  · Call your local CRISIS HOTLINE 784-6119 or 060-892-1050  · Call your local Children's Mobile Crisis Response Team [place] (410) 104-4264 or www.Verient  · Call the toll free National Suicide Prevention Hotlines   · National Suicide Prevention Lifeline 410-419-ZYNI (1719)  · National Hope Line Network 800-SUICIDE (651-0980)

## 2018-01-21 NOTE — WOUND TEAM
"[facility]  Inpatient Services  New Ostomy Management & Teaching    HPI:  Reviewed  PMH: Reviewed   SH: Reviewed    Subjective: \"I have been emptying it.\"    Objective: Pale, fatigued, agreeable.      Ostomy type: Diverting colostomy and mucous fistula.   Stoma location: LLQ.   Stoma assessment:    Appearance: Edematous, red, some scattered slough.    Size: 2.7\"    Protrusion: Budded greater than 1\"    Output: Large, brown, soft/watery, flatus.    MC jxn: Intact.    Peristomal skin: abdomen distended, intact.      Ostomy Appliance (type and size): 2.75 2 piece appliance.     Interventions: Patient removed previous appliance with minimal help from staff, stoma has decreased in size.  Patient cleansed peristomal skin. Patient used template in ostomy supply bag to trace and cut out barrier. to be able to fit under stoma, staff cut the barrier through the top from edge to center hole, then placed around base of stoma and brought the 2 edges together at the top. The barrier was smoothed down, pouch snapped on and bottom of pouch closed.      Pt education: Questions and concerns addressed. Patient worried about the leaking. The 4\" was so big for her abdomen, the 2.75 gives patient more room for movement.     Evaluation: Patient performed 90% of ostomy care today.    Plan: Ostomy nurses to continue to follow for ostomy needs and teaching     Anticipated discharge needs: Supplies, supplier information, possible HH or outpatient ostomy clinic   "

## 2018-01-21 NOTE — DISCHARGE PLANNING
Referral: [facility]    Intervention: Per RN, pt is medically clear today for discharge home with [facility].  BEN spoke with JOSEPH Busch and requested that she confirms [facility] Acceptance.    PLAN:  Home with [facility], pending official acceptance.

## 2018-01-21 NOTE — CARE PLAN
Problem: Pain Management  Goal: Pain level will decrease to patient's comfort goal  Outcome: PROGRESSING SLOWER THAN EXPECTED  Made a pain management plan with pt at start of shift.     Problem: Mobility  Goal: Risk for activity intolerance will decrease  Outcome: PROGRESSING AS EXPECTED  Pt verbalized understanding of need to ambulate.

## 2018-01-21 NOTE — DISCHARGE PLANNING
Referral: C    Intervention: Per CCS, [facility] have pended acceptance until they can confirm PCP, ALICE Allan notified via phone.  Per Sanjana, she will notified KANIKA Burnett to establish if pt is appropriate for discharge home, pending C acceptance.  This SW explained, [facility] acceptance is not a guarantee.     Plan: As Above.

## 2018-01-21 NOTE — PROGRESS NOTES
Flushed port with saline and then 5 cc of heparin, de-accessed port, placed gauze and tegaderm. Pt tolerated well.

## 2018-01-21 NOTE — PROGRESS NOTES
Surgical Progress Note    Author: Hortencia Ha Date & Time created: 2018   9:20 AM     Interval Events:  POD#9 Diagnostic laparoscopy, creation of diverting colostomy and mucous fistula. Pt doing well this morning. Tolerating soft diet without nausea/vomiting.  Pt continues to receive scheduled MS Contin BID, baseline home dose. Opioid tolerant. Pain better controlled with Oxycodone 10mg. No dilaudid IV since yesterday.  Pass gas from ostomy, some stool.  Pt is ambulating and voiding.  Review of Systems   Constitutional: Negative for chills and fever.   Respiratory: Negative for cough and shortness of breath.    Cardiovascular: Negative for chest pain and palpitations.   Gastrointestinal: Positive for abdominal pain (incisional and ostomy site). Negative for diarrhea, heartburn, nausea and vomiting.   Genitourinary: Negative for dysuria.   Skin: Negative for rash.     Hemodynamics:  Temp (24hrs), Av.7 °C (98 °F), Min:36.4 °C (97.6 °F), Max:36.9 °C (98.4 °F)  Temperature: 36.4 °C (97.6 °F)  Pulse  Av.7  Min: 54  Max: 122   Blood Pressure: 103/63     Respiratory:    Respiration: 16, Pulse Oximetry: 94 %        RUL Breath Sounds: Clear, RML Breath Sounds: Clear, RLL Breath Sounds: Diminished, MELISSA Breath Sounds: Clear, LLL Breath Sounds: Diminished  Neuro:  GCS       Fluids:    Intake/Output Summary (Last 24 hours) at 18 0918  Last data filed at 18 0728   Gross per 24 hour   Intake              800 ml   Output              150 ml   Net              650 ml          Current Diet Order   Procedures   • DIET ORDER     Physical Exam   Constitutional: She is oriented to person, place, and time. She appears well-developed and well-nourished. No distress.   HENT:   Head: Normocephalic and atraumatic.   Eyes: Conjunctivae are normal.   Neck: Normal range of motion. Neck supple.   Cardiovascular: Normal rate and regular rhythm.    occ tachycardia to 100s   Pulmonary/Chest: Effort normal. No respiratory  distress.   Abdominal: Soft. She exhibits distension (mild). She exhibits no mass. There is tenderness (mild incisional and ostomy site). There is no rebound and no guarding.   Ostomy dusky, sloughing, less edematous, stool and gas output.   Musculoskeletal: Normal range of motion.   Neurological: She is alert and oriented to person, place, and time.   Skin: Skin is warm and dry. No rash noted. No erythema. No pallor.   Incisions C/D/I.    Psychiatric: She has a normal mood and affect.     Labs:  Recent Results (from the past 24 hour(s))   CBC WITHOUT DIFFERENTIAL    Collection Time: 01/21/18  2:56 AM   Result Value Ref Range    WBC 6.0 4.8 - 10.8 K/uL    RBC 2.65 (L) 4.20 - 5.40 M/uL    Hemoglobin 7.7 (L) 12.0 - 16.0 g/dL    Hematocrit 24.1 (L) 37.0 - 47.0 %    MCV 90.9 81.4 - 97.8 fL    MCH 29.1 27.0 - 33.0 pg    MCHC 32.0 (L) 33.6 - 35.0 g/dL    RDW 45.8 35.9 - 50.0 fL    Platelet Count 366 164 - 446 K/uL    MPV 10.5 9.0 - 12.9 fL   BASIC METABOLIC PANEL    Collection Time: 01/21/18  2:56 AM   Result Value Ref Range    Sodium 140 135 - 145 mmol/L    Potassium 3.5 (L) 3.6 - 5.5 mmol/L    Chloride 105 96 - 112 mmol/L    Co2 28 20 - 33 mmol/L    Glucose 92 65 - 99 mg/dL    Bun 3 (L) 8 - 22 mg/dL    Creatinine 0.45 (L) 0.50 - 1.40 mg/dL    Calcium 8.4 (L) 8.5 - 10.5 mg/dL    Anion Gap 7.0 0.0 - 11.9   ESTIMATED GFR    Collection Time: 01/21/18  2:56 AM   Result Value Ref Range    GFR If African American >60 >60 mL/min/1.73 m 2    GFR If Non African American >60 >60 mL/min/1.73 m 2     Medical Decision Making, by Problem:  Active Hospital Problems    Diagnosis   • Colostomy care (CMS-[facility]) [Z43.3]     Plan:  POD#9 Diagnostic laparoscopy, creation of diverting colostomy and mucous fistula. Pt is alert and oriented, NAD. Breathing unlabored. Tolerating PO.  Abdomen is soft, mildly distended, tender at incision sites and at ostomy site. Incisions C/D/I.  Ostomy dusky, sloughing, less edematous, stool and gas output.  VS  reviewed, afebrile. Labs reviewed, Hb 7.7, recheck H/H peripherally.  Encouraged ambulation and incentive spirometry. Okay for discharge home today with home health. Discussed opioid medication risks. Discussed with Dr. Rosen and [facility] surgical. [facility] surgical covering for emergencies.      Quality Measures:    Reviewed items::  Labs reviewed  Junior catheter::  No Junior  DVT prophylaxis pharmacological::  Enoxaparin (Lovenox)  DVT prophylaxis - mechanical:  SCDs      Discussed patient condition with RN, Patient and Dr. Rosen

## 2018-01-21 NOTE — PROGRESS NOTES
Patient awake and alert, lying in bed. Pt A&Ox4. Pt complaining of 6/10 abdominal pain and was medicated (see MAR). Morning meds given. Pt up self with even and steady gait. Bed locked and in lowest position.  Ostomy in place with pink stoma. Last BM today via ostomy. Call light within reach.

## 2018-01-21 NOTE — DISCHARGE PLANNING
Per BEN Gauthier request, spoke with Norberto at [facility], they have pended acceptance until they can confirm PCP will sign there orders.  ANEESH Gauthier advised.

## 2018-01-21 NOTE — PROGRESS NOTES
Assumed care of patient from day RN. A&Ox 4. Up independently. Room air. Moves all extremities, denies numbness or tingling. Voiding, hypoactive BS, + flatus, LBM active ostomy. GI soft diet, denies nausea/ vomiting. Wound(s): abdominal surgical lap-stabs CDI. Patient reports 8 out of 10 pain, medicated per MAR. Pt is refusing treaded slipper socks, lower bedrails are down. Pt calls appropriatly. Discussed POC, all questions answered at this time. Bed is locked and in the lowest position, call light within reach, Q 2 hour rounding in place.            Pain management plan made with pt at start of shift.      Pt is refusing bed alarm, A&OX4, has been given education regarding the need and patient continues to refuse bed alarm.

## 2018-01-21 NOTE — DISCHARGE SUMMARY
CHIEF COMPLAINT ON ADMISSION  No chief complaint on file.      CODE STATUS  Full Code    HPI & HOSPITAL COURSE  This is a 57 y.o. female here with a history of metastatic colorectal cancer and obstructing rectosigmoid mass. She was admitted to the hospital and underwent the below surgical procedure. Postoperatively, she had a gradual return of bowel function with stool and gas output from her ostomy. Her ostomy is dusky, sloughing, with good function. Upon discharge, she was tolerating a soft diet, VS are stable, Labs are stable. She was slowly transitioned to oral pain meds from IV pain meds. She is opioid tolerant and has required her home baseline dose of MS Contin 30mg BID along with additional Oxycodone PO. She will be given a prescription for Oxycodone with the risks of overdose and abuse discussed. She is being discharged home in the care of home health for her ostomy care and supply needs.      Therefore, she is discharged in good and stable condition with close outpatient follow-up.    SPECIFIC OUTPATIENT FOLLOW-UP  Dr. Rosen's office in 1-2 weeks  Oncology      DISCHARGE PROBLEM LIST  Active Problems:    Colostomy care (CMS-[facility]) POA: Unknown    Postoperative state POA: Unknown  Resolved Problems:    * No resolved hospital problems. *      FOLLOW UP  No future appointments.  No follow-up provider specified.    MEDICATIONS ON DISCHARGE   Clau Lerner   Home Medication Instructions RAYRAY:[id number]    Printed on:01/21/18 0979   Medication Information                      morphine ER (MS CONTIN) 30 MG Tab CR tablet  Take 30 mg by mouth every 12 hours.             oxycodone immediate release (ROXICODONE) 10 MG immediate release tablet  Take 1 Tab by mouth every four hours as needed for Moderate Pain or Severe Pain for up to 5 days.                 DIET  Orders Placed This Encounter   Procedures   • DIET ORDER     Standing Status:   Standing     Number of Occurrences:   1     Order Specific Question:   Diet:      Answer:   Low Fiber(GI Soft) [2]       ACTIVITY  As tolerated.  Weight bearing as tolerated      CONSULTATIONS  Home Health  Complex Care Management    PROCEDURES  1.  Diagnostic laparoscopy.  2.  Mobilization of the splenic flexure.  3.  Creation of diverting colostomy and mucous fistula.  4.  Adhesiolysis    LABORATORY  Lab Results   Component Value Date/Time    SODIUM 140 01/21/2018 02:56 AM    POTASSIUM 3.5 (L) 01/21/2018 02:56 AM    CHLORIDE 105 01/21/2018 02:56 AM    CO2 28 01/21/2018 02:56 AM    GLUCOSE 92 01/21/2018 02:56 AM    BUN 3 (L) 01/21/2018 02:56 AM    CREATININE 0.45 (L) 01/21/2018 02:56 AM        Lab Results   Component Value Date/Time    WBC 6.0 01/21/2018 02:56 AM    HEMOGLOBIN 7.7 (L) 01/21/2018 02:56 AM    HEMATOCRIT 24.1 (L) 01/21/2018 02:56 AM    PLATELETCT 366 01/21/2018 02:56 AM        Total time of the discharge process exceeds 40 minutes

## 2018-01-21 NOTE — WOUND TEAM
"[facility]  Inpatient Services  New Ostomy Management & Teaching    HPI:  Reviewed  PMH: Reviewed   SH: Reviewed    Subjective: \"I was leaking a little, but now it isn't.\"    Objective: Agreeable. Feeling better.      Ostomy type: Diverting colostomy and mucous fistula.   Stoma location: LLQ.   Stoma assessment:    Appearance: Edematous, red, slough.    Size: 2.7\"    Protrusion: Budded greater than 1\"    Output: minimal, sanguinous.    MC jxn: Intact.    Peristomal skin: abdomen less distended, intact.      Ostomy Appliance (type and size): [place] 1 piece 2503 with paste ring.     Interventions: Removed appliance. It had leaked under barrier, slight breakdown to peristomal skin from 1000 to 0300. After cleansing and drying peristomal skin, demonstrated crusting to patient using no sting and stoma powder on peristomal skin. Cut appliance at the largest marked [place], applied paste ring. Stoma has and \"overhang\" from 0500 to 0600, barrier slides over stoma then is pushed slightly upward to fit under overhang. Appliance placed around stoma, bottom of pouch secured. Brava strips applied to edges of barrier.      Pt education: Questions and concerns addressed    Evaluation: Patient performed 40% of ostomy care today.    Plan: Ostomy nurses to continue to follow for ostomy needs and teaching     Anticipated discharge needs: Catalog needs to be marked. Hopefully this appliance will work for patient. Supplies, supplier information, possible HH or outpatient ostomy clinic   "

## 2018-01-22 NOTE — DISCHARGE PLANNING
CCS called [facility] and spoke to Luis. Anysia stated that they are still trying to find an MD to follow the patient. The patient has not seen the PCP in over a year. The cardiologist will not follow patient has not been to their office since September and is refusing to answer their calls. Dr. Gardner office has not returned the calls from to [facility]. SW on floor has been notified via [email or web address].

## 2018-01-22 NOTE — WOUND TEAM
Called by RN to see patient prior to discharge.  Gave patient supplies, marked catalog and had patient practice appliance change on paper without removing her own.  Patient cut appliance, applied paste ring and applied barrier to paper, applied brava strips to barrier.  Patient verbalized steps.  Reviewed crusted and purple folder.  Patient to be discharged today with OP wound care follow up.

## 2018-01-24 NOTE — DISCHARGE PLANNING
ATTN: Case Management  RE: Referral for Home Health                We would like to take this opportunity to thank you for submitting a referral for your patient to continue care with [facility]. Our skilled team is dedicated to helping all patients recover and gain independence in the home setting.            As of 01/24/2018, we have accepted the above patient into our service. A [facility] clinician will be out to see the patient within 48 hours to conduct our initial visit. If you have any questions or concerns regarding the patient’s transition to Home Health, please do not hesitate to contact us. We are open for referrals 7 days a week from 8AM to 5PM at [phone].      We look forward to collaborating with you,  [facility] Team

## 2018-01-27 ENCOUNTER — HOME CARE VISIT (OUTPATIENT)
Dept: HOME HEALTH SERVICES | Facility: HOME HEALTHCARE | Age: 58
End: 2018-01-27

## 2018-03-09 ENCOUNTER — TELEPHONE (OUTPATIENT)
Dept: PHYSICAL THERAPY | Facility: MEDICAL CENTER | Age: 58
End: 2018-03-09

## 2018-03-09 NOTE — TELEPHONE ENCOUNTER
Pt is discharged from PT as she has not been able to return due to health reasons.    Connie Fontenot, PT, DPT

## 2021-03-15 DIAGNOSIS — Z23 NEED FOR VACCINATION: ICD-10-CM

## 2021-11-01 NOTE — PROGRESS NOTES
"Surgical Progress Note    Author: Hailey Teixeira Date & Time created: 2018   8:23 AM     Interval Events:  Pt direct admit for bowel obstruction. Per pt she was told she needs surgery tomorrow. Reports no flatus or  bowel movement in 2 days. She admits to nausea. Last episode of vomiting 2 days ago. Admits to abdominal pain and bloating. Alert and oriented. NAD. Breathing unlabored. Abdomen distended, soft, mildly tender. NPO. Will discuss nutrition with Dr. Rosen. Pt has a port for chemo. Port is apparently \"clogged\". Will discuss with Dr. Rosen. Vs reviewed.   Review of Systems   Constitutional: Negative for chills and fever.   Respiratory: Negative for cough and shortness of breath.    Gastrointestinal: Positive for abdominal pain, constipation and nausea. Negative for diarrhea, heartburn and vomiting.   Skin: Negative for itching and rash.     Hemodynamics:  Temp (24hrs), Av.5 °C (97.7 °F), Min:36.4 °C (97.6 °F), Max:36.6 °C (97.8 °F)  Temperature: 36.4 °C (97.6 °F)  Pulse  Av.3  Min: 72  Max: 97   Blood Pressure: 117/71     Respiratory:    Respiration: 16, Pulse Oximetry: 94 %, O2 Daily Delivery Respiratory : Room Air with O2 Available     Given By:: Mouthpiece  RUL Breath Sounds: Clear, RML Breath Sounds: Clear, RLL Breath Sounds: Pleural Rub, MELISSA Breath Sounds: Clear, LLL Breath Sounds: Diminished  Neuro:  GCS       Fluids:  No intake or output data in the 24 hours ending 18 0823  Weight: 46.7 kg (102 lb 15.3 oz)  Current Diet Order   Procedures   • DIET NPO     Physical Exam   Constitutional: She is oriented to person, place, and time. She appears well-developed and well-nourished. No distress.   HENT:   Head: Normocephalic.   Cardiovascular: Normal rate.    Pulmonary/Chest: Effort normal. No respiratory distress.   Abdominal: Soft. She exhibits distension. She exhibits no mass. There is tenderness. There is no rebound and no guarding.   Neurological: She is alert and oriented to person, " "place, and time.   Skin: Skin is warm and dry. No rash noted. She is not diaphoretic. No erythema.   Psychiatric: She has a normal mood and affect. Her behavior is normal.   Vitals reviewed.    Labs:  Recent Results (from the past 24 hour(s))   EKG    Collection Time: 01/10/18  5:55 PM   Result Value Ref Range    Report       [facility]    Test Date:  2018-01-10  Pt Name:    RAMA MORALES                 Department: 131  MRN:        [id number]                      Room:       T313  Gender:     Female                       Technician: ERUM  :        [id number]                   Requested By:DAMION STONE  Order #:    [id number]                    Reading MD: Behzad Lincoln MD    Measurements  Intervals                                Axis  Rate:       86                           P:          86  OR:         156                          QRS:        55  QRSD:       90                           T:          5  QT:         380  QTc:        455    Interpretive Statements  SINUS RHYTHM  BORDERLINE T ABNORMALITIES, DIFFUSE LEADS  No previous ECG available for comparison    Electronically Signed On 1- 18:47:27 PST by Behzad Lincoln MD       Medical Decision Making, by Problem:  There are no active hospital problems to display for this patient.    Plan:  Pt direct admit for bowel obstruction. Per pt she was told she needs surgery tomorrow. Reports no flatus or  bowel movement in 2 days. She admits to nausea. Last episode of vomiting 2 days ago. Admits to abdominal pain and bloating. Alert and oriented. NAD. Breathing unlabored. Abdomen distended, soft, mildly tender. NPO. Will discuss nutrition with Dr. Stone. Pt has a port for chemo. Port is apparently \"clogged\". Will discuss with Dr. Stone. Vs reviewed. Will order new labs.    Quality Measures:    Reviewed items::  Labs reviewed and Medications reviewed  Junior catheter::  No Junior  DVT prophylaxis pharmacological::  Enoxaparin (Lovenox)  DVT prophylaxis - " mechanical:  SCDs  Ulcer Prophylaxis::  Yes      Discussed patient condition with Patient and Dr. Rosen.   show

## (undated) DEVICE — KIT ANESTHESIA W/CIRCUIT & 3/LT BAG W/FILTER (20EA/CA)

## (undated) DEVICE — SLEEVE, VASO, THIGH, MED

## (undated) DEVICE — GOWN SURGICAL X-LARGE ULTRA - FILM-REINFORCED (20/CA)

## (undated) DEVICE — SET SUCTION/IRRIGATION WITH DISPOSABLE TIP (6/CA )PART #0250-070-520 IS A SUB

## (undated) DEVICE — STAPLER SKIN DISP - (6/BX 10BX/CA) VISISTAT

## (undated) DEVICE — SYRINGE DISP. 12 CC LL - (100/BX)

## (undated) DEVICE — SENSOR SPO2 NEO LNCS ADHESIVE (20/BX) SEE USER NOTES

## (undated) DEVICE — WATER IRRIG. STER. 1500 ML - (9/CA)

## (undated) DEVICE — SET EXTENSION WITH 2 PORTS (48EA/CA) ***PART #2C8610 IS A SUBSTITUTE*****

## (undated) DEVICE — ELECTRODE 850 FOAM ADHESIVE - HYDROGEL RADIOTRNSPRNT (50/PK)

## (undated) DEVICE — CANNULA W/SEAL 5X100 Z-THRE - ADED KII (12/BX)

## (undated) DEVICE — TOWELS CLOTH SURGICAL - (4/PK 20PK/CA)

## (undated) DEVICE — MASK, LARYNGEAL AIRWAY #4

## (undated) DEVICE — TUBE CONNECT SUCTION CLEAR 120 X 1/4" (50EA/CA)"

## (undated) DEVICE — SODIUM CHL IRRIGATION 0.9% 1000ML (12EA/CA)

## (undated) DEVICE — KIT ROOM DECONTAMINATION

## (undated) DEVICE — SPONGE GAUZESTER 4 X 4 4PLY - (128PK/CA)

## (undated) DEVICE — GLOVE BIOGEL SZ 6.5 SURGICAL PF LTX (50PR/BX 4BX/CA)

## (undated) DEVICE — ANTI-FOG SOLUTION - 60BTL/CA

## (undated) DEVICE — PROTECTOR ULNA NERVE - (36PR/CA)

## (undated) DEVICE — SUCTION INSTRUMENT YANKAUER BULBOUS TIP W/O VENT (50EA/CA)

## (undated) DEVICE — CLOSURE SKIN STRIP 1/2 X 4 IN - (STERI STRIP) (50/BX 4BX/CA)

## (undated) DEVICE — MEDICINE CUP STERILE 2 OZ - (100/CA)

## (undated) DEVICE — SUTURE GENERAL

## (undated) DEVICE — SEALER ARTICULATING TISSUE NSEAL (6/BX)

## (undated) DEVICE — MASK ANESTHESIA ADULT  - (100/CA)

## (undated) DEVICE — TUBE E-T HI-LO CUFF 6.5MM (10EA/BX)

## (undated) DEVICE — LACTATED RINGERS INJ 1000 ML - (14EA/CA 60CA/PF)

## (undated) DEVICE — PACK CYSTOSCOPY III - (8/CA)

## (undated) DEVICE — CONNECTOR HOSE NEPTUNE FOR CYSTO ROOM

## (undated) DEVICE — NEPTUNE 4 PORT MANIFOLD - (20/PK)

## (undated) DEVICE — SET IRRIGATION CYSTOSCOPY Y-TYPE L81 IN (20EA/CA)

## (undated) DEVICE — GOWN SURGEONS X-LARGE - DISP. (30/CA)

## (undated) DEVICE — BLADE SURGICAL #11 - (50/BX)

## (undated) DEVICE — SET LEADWIRE 5 LEAD BEDSIDE DISPOSABLE ECG (1SET OF 5/EA)

## (undated) DEVICE — COVER FOOT UNIVERSAL DISP. - (25EA/CA)

## (undated) DEVICE — GLOVE SZ 7 BIOGEL PI MICRO - PF LF (50PR/BX 4BX/CA)

## (undated) DEVICE — BANDAID SHEER STRIP 3/4 IN (100EA/BX 12BX/CA)

## (undated) DEVICE — TUBING CLEARLINK DUO-VENT - C-FLO (48EA/CA)

## (undated) DEVICE — SUTURE 4-0 VICRYL PLUSFS-1 - 27 INCH (36/BX)

## (undated) DEVICE — TROCAR Z THREAD12MM OPTICAL - NON BLADED (6/BX)

## (undated) DEVICE — GLOVE BIOGEL SZ 7.5 SURGICAL PF LTX - (50PR/BX 4BX/CA)

## (undated) DEVICE — BAG URODRAIN WITH TUBING - (20/CA)

## (undated) DEVICE — CATHETER URETHRAL OPEN END AXXCESS (10EA/BX)

## (undated) DEVICE — WIRE GUIDE SENSOR DUAL FLEX - 5/BX

## (undated) DEVICE — CONTAINER SPECIMEN BAG OR - STERILE 4 OZ W/LID (100EA/CA)

## (undated) DEVICE — JELLY, KY 2 0Z STERILE

## (undated) DEVICE — HEAD HOLDER JUNIOR/ADULT

## (undated) DEVICE — SUTURE 1 VICRYL PLUS CTX - 36 INCH (36/BX)

## (undated) DEVICE — GLOVE BIOGEL PI INDICATOR SZ 7.0 SURGICAL PF LF - (50/BX 4BX/CA)

## (undated) DEVICE — STAPLER 75MM LINEAR OPEN (3EA/BX)

## (undated) DEVICE — TROCAR 5X100 NON BLADED Z-TH - READ KII (6/BX)

## (undated) DEVICE — WATER IRRIG. STER 3000 ML - (4/CA)

## (undated) DEVICE — CHLORAPREP 26 ML APPLICATOR - ORANGE TINT(25/CA)

## (undated) DEVICE — GOWN WARMING STANDARD FLEX - (30/CA)

## (undated) DEVICE — GLOVE BIOGEL SZ 7 SURGICAL PF LTX - (50PR/BX 4BX/CA)

## (undated) DEVICE — TUBE E-T HI-LO CUFF 7.0MM (10EA/PK)

## (undated) DEVICE — ELECTRODE DUAL RETURN W/ CORD - (50/PK)

## (undated) DEVICE — BANDAID X-LARGE 2 X 4 IN LF (50EA/BX)

## (undated) DEVICE — CANISTER SUCTION 3000ML MECHANICAL FILTER AUTO SHUTOFF MEDI-VAC NONSTERILE LF DISP  (40EA/CA)

## (undated) DEVICE — SET FLUID WARMING IRRIGATRION CYSTOSCOPY (10EA/CA)

## (undated) DEVICE — BLADE SURGICAL CLIPPER - (50EA/CA)

## (undated) DEVICE — DRAIN PENROSE STERILE 1/4 X - 18 IN  (25EA/BX)

## (undated) DEVICE — GLOVE BIOGEL INDICATOR SZ 7SURGICAL PF LTX - (50/BX 4BX/CA)

## (undated) DEVICE — TUBE NG SALEM SUMP 16FR (50EA/CA)

## (undated) DEVICE — DRAPE MAYO STAND - (30/CA)

## (undated) DEVICE — SUTURE 3-0 VICRYL PLUS SH - 8X 18 INCH (12/BX)

## (undated) DEVICE — GLOVE BIOGEL PI INDICATOR SZ 7.5 SURGICAL PF LF -(50/BX 4BX/CA)

## (undated) DEVICE — SUTURE 3-0 CHROMIC GUT SH 27 (36PK/BX)"

## (undated) DEVICE — PACK LAP CHOLE OR - (2EA/CA)

## (undated) DEVICE — SPONGE XRAY 8X4 STERL. 12PL - (10EA/TY 80TY/CA)

## (undated) DEVICE — STERI STRIP COMPOUND BENZOIN - TINCTURE 0.6ML WITH APPLICATOR (40EA/BX)

## (undated) DEVICE — LEAD SET 6 DISP. EKG NIHON KOHDEN (100EA/CA) [9859].

## (undated) DEVICE — STAPLER POWERED 60MM (3EA/BX)

## (undated) DEVICE — RELOAD WITH GRIPPING SURFACE TECHNOLOGY BLUE 60MM (12EA/BX)